# Patient Record
Sex: FEMALE | Race: WHITE | NOT HISPANIC OR LATINO
[De-identification: names, ages, dates, MRNs, and addresses within clinical notes are randomized per-mention and may not be internally consistent; named-entity substitution may affect disease eponyms.]

---

## 2017-01-03 ENCOUNTER — RX RENEWAL (OUTPATIENT)
Age: 67
End: 2017-01-03

## 2017-01-23 ENCOUNTER — MESSAGE (OUTPATIENT)
Age: 67
End: 2017-01-23

## 2017-02-28 ENCOUNTER — OUTPATIENT (OUTPATIENT)
Dept: OUTPATIENT SERVICES | Facility: HOSPITAL | Age: 67
LOS: 1 days | Discharge: HOME | End: 2017-02-28

## 2017-02-28 DIAGNOSIS — D47.3 ESSENTIAL (HEMORRHAGIC) THROMBOCYTHEMIA: ICD-10-CM

## 2017-03-01 ENCOUNTER — RESULT REVIEW (OUTPATIENT)
Age: 67
End: 2017-03-01

## 2017-03-02 ENCOUNTER — APPOINTMENT (OUTPATIENT)
Dept: HEMATOLOGY ONCOLOGY | Facility: CLINIC | Age: 67
End: 2017-03-02

## 2017-03-02 VITALS
SYSTOLIC BLOOD PRESSURE: 120 MMHG | HEART RATE: 82 BPM | WEIGHT: 178.57 LBS | RESPIRATION RATE: 16 BRPM | DIASTOLIC BLOOD PRESSURE: 72 MMHG | BODY MASS INDEX: 31.63 KG/M2 | TEMPERATURE: 97.4 F | OXYGEN SATURATION: 98 %

## 2017-03-02 LAB
BASOPHILS # BLD AUTO: 0 K/UL — SIGNIFICANT CHANGE UP (ref 0–0.2)
BASOPHILS NFR BLD AUTO: 0.5 % — SIGNIFICANT CHANGE UP (ref 0–2)
EOSINOPHIL # BLD AUTO: 0.1 K/UL — SIGNIFICANT CHANGE UP (ref 0–0.5)
EOSINOPHIL NFR BLD AUTO: 1 % — SIGNIFICANT CHANGE UP (ref 0–6)
HCT VFR BLD CALC: 36.4 % — SIGNIFICANT CHANGE UP (ref 34.5–45)
HGB BLD-MCNC: 13.2 G/DL — SIGNIFICANT CHANGE UP (ref 11.5–15.5)
LYMPHOCYTES # BLD AUTO: 1.3 K/UL — SIGNIFICANT CHANGE UP (ref 1–3.3)
LYMPHOCYTES # BLD AUTO: 15.3 % — SIGNIFICANT CHANGE UP (ref 13–44)
MCHC RBC-ENTMCNC: 36.4 G/DL — HIGH (ref 32–36)
MCHC RBC-ENTMCNC: 37.2 PG — HIGH (ref 27–34)
MCV RBC AUTO: 102 FL — HIGH (ref 80–100)
MONOCYTES # BLD AUTO: 0.6 K/UL — SIGNIFICANT CHANGE UP (ref 0–0.9)
MONOCYTES NFR BLD AUTO: 7.6 % — SIGNIFICANT CHANGE UP (ref 2–14)
NEUTROPHILS # BLD AUTO: 6.4 K/UL — SIGNIFICANT CHANGE UP (ref 1.8–7.4)
NEUTROPHILS NFR BLD AUTO: 75.5 % — SIGNIFICANT CHANGE UP (ref 43–77)
PLATELET # BLD AUTO: 333 K/UL — SIGNIFICANT CHANGE UP (ref 150–400)
RBC # BLD: 3.56 M/UL — LOW (ref 3.8–5.2)
RBC # FLD: 10.7 % — SIGNIFICANT CHANGE UP (ref 10.3–14.5)
WBC # BLD: 8.4 K/UL — SIGNIFICANT CHANGE UP (ref 3.8–10.5)
WBC # FLD AUTO: 8.4 K/UL — SIGNIFICANT CHANGE UP (ref 3.8–10.5)

## 2017-04-28 ENCOUNTER — OUTPATIENT (OUTPATIENT)
Dept: OUTPATIENT SERVICES | Facility: HOSPITAL | Age: 67
LOS: 1 days | Discharge: HOME | End: 2017-04-28

## 2017-04-28 DIAGNOSIS — D47.3 ESSENTIAL (HEMORRHAGIC) THROMBOCYTHEMIA: ICD-10-CM

## 2017-05-01 ENCOUNTER — RESULT REVIEW (OUTPATIENT)
Age: 67
End: 2017-05-01

## 2017-05-02 ENCOUNTER — APPOINTMENT (OUTPATIENT)
Dept: HEMATOLOGY ONCOLOGY | Facility: CLINIC | Age: 67
End: 2017-05-02

## 2017-05-02 LAB
BASOPHILS # BLD AUTO: 0.1 K/UL — SIGNIFICANT CHANGE UP (ref 0–0.2)
BASOPHILS NFR BLD AUTO: 0.7 % — SIGNIFICANT CHANGE UP (ref 0–2)
EOSINOPHIL # BLD AUTO: 0 K/UL — SIGNIFICANT CHANGE UP (ref 0–0.5)
EOSINOPHIL NFR BLD AUTO: 0.5 % — SIGNIFICANT CHANGE UP (ref 0–6)
HCT VFR BLD CALC: 39.6 % — SIGNIFICANT CHANGE UP (ref 34.5–45)
HGB BLD-MCNC: 14.3 G/DL — SIGNIFICANT CHANGE UP (ref 11.5–15.5)
LYMPHOCYTES # BLD AUTO: 1.5 K/UL — SIGNIFICANT CHANGE UP (ref 1–3.3)
LYMPHOCYTES # BLD AUTO: 16.9 % — SIGNIFICANT CHANGE UP (ref 13–44)
MCHC RBC-ENTMCNC: 36.1 G/DL — HIGH (ref 32–36)
MCHC RBC-ENTMCNC: 37.6 PG — HIGH (ref 27–34)
MCV RBC AUTO: 104 FL — HIGH (ref 80–100)
MONOCYTES # BLD AUTO: 0.5 K/UL — SIGNIFICANT CHANGE UP (ref 0–0.9)
MONOCYTES NFR BLD AUTO: 5.6 % — SIGNIFICANT CHANGE UP (ref 2–14)
NEUTROPHILS # BLD AUTO: 6.9 K/UL — SIGNIFICANT CHANGE UP (ref 1.8–7.4)
NEUTROPHILS NFR BLD AUTO: 76.2 % — SIGNIFICANT CHANGE UP (ref 43–77)
PLATELET # BLD AUTO: 372 K/UL — SIGNIFICANT CHANGE UP (ref 150–400)
RBC # BLD: 3.81 M/UL — SIGNIFICANT CHANGE UP (ref 3.8–5.2)
RBC # FLD: 10.9 % — SIGNIFICANT CHANGE UP (ref 10.3–14.5)
WBC # BLD: 9.1 K/UL — SIGNIFICANT CHANGE UP (ref 3.8–10.5)
WBC # FLD AUTO: 9.1 K/UL — SIGNIFICANT CHANGE UP (ref 3.8–10.5)

## 2017-07-05 ENCOUNTER — OUTPATIENT (OUTPATIENT)
Dept: OUTPATIENT SERVICES | Facility: HOSPITAL | Age: 67
LOS: 1 days | Discharge: HOME | End: 2017-07-05

## 2017-07-05 DIAGNOSIS — D47.3 ESSENTIAL (HEMORRHAGIC) THROMBOCYTHEMIA: ICD-10-CM

## 2017-07-11 ENCOUNTER — RESULT REVIEW (OUTPATIENT)
Age: 67
End: 2017-07-11

## 2017-07-11 ENCOUNTER — APPOINTMENT (OUTPATIENT)
Dept: HEMATOLOGY ONCOLOGY | Facility: CLINIC | Age: 67
End: 2017-07-11

## 2017-07-11 VITALS
BODY MASS INDEX: 31.28 KG/M2 | RESPIRATION RATE: 16 BRPM | OXYGEN SATURATION: 96 % | TEMPERATURE: 98.8 F | DIASTOLIC BLOOD PRESSURE: 89 MMHG | WEIGHT: 176.59 LBS | HEART RATE: 84 BPM | SYSTOLIC BLOOD PRESSURE: 148 MMHG

## 2017-07-11 LAB
BASOPHILS # BLD AUTO: 0.1 K/UL — SIGNIFICANT CHANGE UP (ref 0–0.2)
BASOPHILS NFR BLD AUTO: 0.6 % — SIGNIFICANT CHANGE UP (ref 0–2)
EOSINOPHIL # BLD AUTO: 0.1 K/UL — SIGNIFICANT CHANGE UP (ref 0–0.5)
EOSINOPHIL NFR BLD AUTO: 0.6 % — SIGNIFICANT CHANGE UP (ref 0–6)
HCT VFR BLD CALC: 39.8 % — SIGNIFICANT CHANGE UP (ref 34.5–45)
HGB BLD-MCNC: 14.5 G/DL — SIGNIFICANT CHANGE UP (ref 11.5–15.5)
LYMPHOCYTES # BLD AUTO: 1.3 K/UL — SIGNIFICANT CHANGE UP (ref 1–3.3)
LYMPHOCYTES # BLD AUTO: 13.5 % — SIGNIFICANT CHANGE UP (ref 13–44)
MCHC RBC-ENTMCNC: 36.3 G/DL — HIGH (ref 32–36)
MCHC RBC-ENTMCNC: 38.4 PG — HIGH (ref 27–34)
MCV RBC AUTO: 106 FL — HIGH (ref 80–100)
MONOCYTES # BLD AUTO: 0.7 K/UL — SIGNIFICANT CHANGE UP (ref 0–0.9)
MONOCYTES NFR BLD AUTO: 7.1 % — SIGNIFICANT CHANGE UP (ref 2–14)
NEUTROPHILS # BLD AUTO: 7.4 K/UL — SIGNIFICANT CHANGE UP (ref 1.8–7.4)
NEUTROPHILS NFR BLD AUTO: 78.3 % — HIGH (ref 43–77)
PLATELET # BLD AUTO: 380 K/UL — SIGNIFICANT CHANGE UP (ref 150–400)
RBC # BLD: 3.77 M/UL — LOW (ref 3.8–5.2)
RBC # FLD: 11 % — SIGNIFICANT CHANGE UP (ref 10.3–14.5)
WBC # BLD: 9.4 K/UL — SIGNIFICANT CHANGE UP (ref 3.8–10.5)
WBC # FLD AUTO: 9.4 K/UL — SIGNIFICANT CHANGE UP (ref 3.8–10.5)

## 2017-07-18 LAB
ALBUMIN SERPL ELPH-MCNC: 4.3 G/DL
ALBUMIN SERPL ELPH-MCNC: 4.6 G/DL
ALP BLD-CCNC: 65 U/L
ALP BLD-CCNC: 65 U/L
ALT SERPL-CCNC: 16 U/L
ALT SERPL-CCNC: 20 U/L
ANION GAP SERPL CALC-SCNC: 13 MMOL/L
ANION GAP SERPL CALC-SCNC: 19 MMOL/L
AST SERPL-CCNC: 14 U/L
AST SERPL-CCNC: 21 U/L
BILIRUB SERPL-MCNC: 0.4 MG/DL
BILIRUB SERPL-MCNC: 0.4 MG/DL
BUN SERPL-MCNC: 12 MG/DL
BUN SERPL-MCNC: 15 MG/DL
CALCIUM SERPL-MCNC: 9.3 MG/DL
CALCIUM SERPL-MCNC: 9.7 MG/DL
CHLORIDE SERPL-SCNC: 102 MMOL/L
CHLORIDE SERPL-SCNC: 104 MMOL/L
CO2 SERPL-SCNC: 22 MMOL/L
CO2 SERPL-SCNC: 25 MMOL/L
CREAT SERPL-MCNC: 0.64 MG/DL
CREAT SERPL-MCNC: 0.7 MG/DL
GLUCOSE SERPL-MCNC: 119 MG/DL
GLUCOSE SERPL-MCNC: 138 MG/DL
LDH SERPL-CCNC: 179 U/L
LDH SERPL-CCNC: 194 U/L
POTASSIUM SERPL-SCNC: 4 MMOL/L
POTASSIUM SERPL-SCNC: 4.1 MMOL/L
PROT SERPL-MCNC: 6.8 G/DL
PROT SERPL-MCNC: 7.2 G/DL
SODIUM SERPL-SCNC: 142 MMOL/L
SODIUM SERPL-SCNC: 143 MMOL/L

## 2017-08-30 ENCOUNTER — RX RENEWAL (OUTPATIENT)
Age: 67
End: 2017-08-30

## 2017-08-30 ENCOUNTER — OUTPATIENT (OUTPATIENT)
Dept: OUTPATIENT SERVICES | Facility: HOSPITAL | Age: 67
LOS: 1 days | Discharge: HOME | End: 2017-08-30

## 2017-08-30 DIAGNOSIS — D47.3 ESSENTIAL (HEMORRHAGIC) THROMBOCYTHEMIA: ICD-10-CM

## 2017-09-06 ENCOUNTER — RESULT REVIEW (OUTPATIENT)
Age: 67
End: 2017-09-06

## 2017-09-06 ENCOUNTER — APPOINTMENT (OUTPATIENT)
Dept: HEMATOLOGY ONCOLOGY | Facility: CLINIC | Age: 67
End: 2017-09-06

## 2017-09-06 LAB
BASOPHILS # BLD AUTO: 0.1 K/UL — SIGNIFICANT CHANGE UP (ref 0–0.2)
BASOPHILS NFR BLD AUTO: 1.1 % — SIGNIFICANT CHANGE UP (ref 0–2)
EOSINOPHIL # BLD AUTO: 0 K/UL — SIGNIFICANT CHANGE UP (ref 0–0.5)
EOSINOPHIL NFR BLD AUTO: 0 % — SIGNIFICANT CHANGE UP (ref 0–6)
HCT VFR BLD CALC: 39.2 % — SIGNIFICANT CHANGE UP (ref 34.5–45)
HGB BLD-MCNC: 14.4 G/DL — SIGNIFICANT CHANGE UP (ref 11.5–15.5)
LYMPHOCYTES # BLD AUTO: 1.6 K/UL — SIGNIFICANT CHANGE UP (ref 1–3.3)
LYMPHOCYTES # BLD AUTO: 18.4 % — SIGNIFICANT CHANGE UP (ref 13–44)
MCHC RBC-ENTMCNC: 36.8 G/DL — HIGH (ref 32–36)
MCHC RBC-ENTMCNC: 38.1 PG — HIGH (ref 27–34)
MCV RBC AUTO: 104 FL — HIGH (ref 80–100)
MONOCYTES # BLD AUTO: 0.5 K/UL — SIGNIFICANT CHANGE UP (ref 0–0.9)
MONOCYTES NFR BLD AUTO: 5.9 % — SIGNIFICANT CHANGE UP (ref 2–14)
NEUTROPHILS # BLD AUTO: 6.6 K/UL — SIGNIFICANT CHANGE UP (ref 1.8–7.4)
NEUTROPHILS NFR BLD AUTO: 74.7 % — SIGNIFICANT CHANGE UP (ref 43–77)
PLATELET # BLD AUTO: 344 K/UL — SIGNIFICANT CHANGE UP (ref 150–400)
RBC # BLD: 3.78 M/UL — LOW (ref 3.8–5.2)
RBC # FLD: 10.8 % — SIGNIFICANT CHANGE UP (ref 10.3–14.5)
WBC # BLD: 8.8 K/UL — SIGNIFICANT CHANGE UP (ref 3.8–10.5)
WBC # FLD AUTO: 8.8 K/UL — SIGNIFICANT CHANGE UP (ref 3.8–10.5)

## 2017-09-25 ENCOUNTER — MEDICATION RENEWAL (OUTPATIENT)
Age: 67
End: 2017-09-25

## 2017-09-28 ENCOUNTER — NON-APPOINTMENT (OUTPATIENT)
Age: 67
End: 2017-09-28

## 2017-09-28 ENCOUNTER — APPOINTMENT (OUTPATIENT)
Dept: CARDIOLOGY | Facility: CLINIC | Age: 67
End: 2017-09-28
Payer: MEDICARE

## 2017-09-28 VITALS
SYSTOLIC BLOOD PRESSURE: 150 MMHG | OXYGEN SATURATION: 97 % | DIASTOLIC BLOOD PRESSURE: 92 MMHG | HEART RATE: 86 BPM | BODY MASS INDEX: 31.18 KG/M2 | WEIGHT: 176 LBS

## 2017-09-28 PROCEDURE — 93000 ELECTROCARDIOGRAM COMPLETE: CPT

## 2017-09-28 PROCEDURE — 99205 OFFICE O/P NEW HI 60 MIN: CPT

## 2017-09-28 PROCEDURE — 99245 OFF/OP CONSLTJ NEW/EST HI 55: CPT

## 2017-09-28 RX ORDER — CEFUROXIME AXETIL 250 MG/1
250 TABLET ORAL
Qty: 20 | Refills: 0 | Status: DISCONTINUED | COMMUNITY
Start: 2017-06-20

## 2017-09-28 RX ORDER — OXYCODONE AND ACETAMINOPHEN 5; 325 MG/1; MG/1
5-325 TABLET ORAL
Qty: 12 | Refills: 0 | Status: DISCONTINUED | COMMUNITY
Start: 2017-07-18

## 2017-10-11 ENCOUNTER — OUTPATIENT (OUTPATIENT)
Dept: OUTPATIENT SERVICES | Facility: HOSPITAL | Age: 67
LOS: 1 days | End: 2017-10-11
Payer: MEDICARE

## 2017-10-11 ENCOUNTER — APPOINTMENT (OUTPATIENT)
Dept: CV DIAGNOSITCS | Facility: HOSPITAL | Age: 67
End: 2017-10-11

## 2017-10-11 ENCOUNTER — APPOINTMENT (OUTPATIENT)
Dept: CV DIAGNOSTICS | Facility: HOSPITAL | Age: 67
End: 2017-10-11

## 2017-10-11 DIAGNOSIS — R94.31 ABNORMAL ELECTROCARDIOGRAM [ECG] [EKG]: ICD-10-CM

## 2017-10-11 PROCEDURE — 78452 HT MUSCLE IMAGE SPECT MULT: CPT | Mod: 26

## 2017-10-11 PROCEDURE — 78452 HT MUSCLE IMAGE SPECT MULT: CPT

## 2017-10-11 PROCEDURE — 93306 TTE W/DOPPLER COMPLETE: CPT

## 2017-10-11 PROCEDURE — 93016 CV STRESS TEST SUPVJ ONLY: CPT

## 2017-10-11 PROCEDURE — 93018 CV STRESS TEST I&R ONLY: CPT

## 2017-10-11 PROCEDURE — 93017 CV STRESS TEST TRACING ONLY: CPT

## 2017-10-11 PROCEDURE — 93306 TTE W/DOPPLER COMPLETE: CPT | Mod: 26

## 2017-10-31 ENCOUNTER — APPOINTMENT (OUTPATIENT)
Dept: INTERNAL MEDICINE | Facility: CLINIC | Age: 67
End: 2017-10-31
Payer: MEDICARE

## 2017-10-31 VITALS
OXYGEN SATURATION: 98 % | DIASTOLIC BLOOD PRESSURE: 80 MMHG | HEART RATE: 84 BPM | SYSTOLIC BLOOD PRESSURE: 130 MMHG | WEIGHT: 176 LBS | HEIGHT: 63 IN | BODY MASS INDEX: 31.18 KG/M2

## 2017-10-31 PROCEDURE — G0439: CPT

## 2017-10-31 PROCEDURE — 99214 OFFICE O/P EST MOD 30 MIN: CPT

## 2017-11-03 ENCOUNTER — OUTPATIENT (OUTPATIENT)
Dept: OUTPATIENT SERVICES | Facility: HOSPITAL | Age: 67
LOS: 1 days | Discharge: HOME | End: 2017-11-03

## 2017-11-03 DIAGNOSIS — D47.3 ESSENTIAL (HEMORRHAGIC) THROMBOCYTHEMIA: ICD-10-CM

## 2017-11-03 LAB
25(OH)D3 SERPL-MCNC: 25 NG/ML
CHOLEST SERPL-MCNC: 190 MG/DL
CHOLEST/HDLC SERPL: 2.4 RATIO
HBA1C MFR BLD HPLC: 5.4 %
HDLC SERPL-MCNC: 79 MG/DL
LDLC SERPL CALC-MCNC: 76 MG/DL
TRIGL SERPL-MCNC: 175 MG/DL
TSH SERPL-ACNC: 3.26 UIU/ML

## 2017-11-07 ENCOUNTER — APPOINTMENT (OUTPATIENT)
Dept: HEMATOLOGY ONCOLOGY | Facility: CLINIC | Age: 67
End: 2017-11-07
Payer: MEDICARE

## 2017-11-07 ENCOUNTER — RESULT REVIEW (OUTPATIENT)
Age: 67
End: 2017-11-07

## 2017-11-07 VITALS
TEMPERATURE: 98.9 F | OXYGEN SATURATION: 98 % | RESPIRATION RATE: 16 BRPM | DIASTOLIC BLOOD PRESSURE: 94 MMHG | BODY MASS INDEX: 31.01 KG/M2 | HEART RATE: 83 BPM | SYSTOLIC BLOOD PRESSURE: 134 MMHG | WEIGHT: 175.05 LBS

## 2017-11-07 LAB
BASOPHILS # BLD AUTO: 0 K/UL — SIGNIFICANT CHANGE UP (ref 0–0.2)
BASOPHILS NFR BLD AUTO: 0.3 % — SIGNIFICANT CHANGE UP (ref 0–2)
EOSINOPHIL # BLD AUTO: 0 K/UL — SIGNIFICANT CHANGE UP (ref 0–0.5)
EOSINOPHIL NFR BLD AUTO: 0.3 % — SIGNIFICANT CHANGE UP (ref 0–6)
HCT VFR BLD CALC: 38.2 % — SIGNIFICANT CHANGE UP (ref 34.5–45)
HEMOCCULT STL QL IA: NEGATIVE
HGB BLD-MCNC: 14.1 G/DL — SIGNIFICANT CHANGE UP (ref 11.5–15.5)
LYMPHOCYTES # BLD AUTO: 1.6 K/UL — SIGNIFICANT CHANGE UP (ref 1–3.3)
LYMPHOCYTES # BLD AUTO: 19.7 % — SIGNIFICANT CHANGE UP (ref 13–44)
MCHC RBC-ENTMCNC: 36.8 G/DL — HIGH (ref 32–36)
MCHC RBC-ENTMCNC: 38.3 PG — HIGH (ref 27–34)
MCV RBC AUTO: 104 FL — HIGH (ref 80–100)
MONOCYTES # BLD AUTO: 0.7 K/UL — SIGNIFICANT CHANGE UP (ref 0–0.9)
MONOCYTES NFR BLD AUTO: 8.3 % — SIGNIFICANT CHANGE UP (ref 2–14)
NEUTROPHILS # BLD AUTO: 5.9 K/UL — SIGNIFICANT CHANGE UP (ref 1.8–7.4)
NEUTROPHILS NFR BLD AUTO: 71.4 % — SIGNIFICANT CHANGE UP (ref 43–77)
PLATELET # BLD AUTO: 330 K/UL — SIGNIFICANT CHANGE UP (ref 150–400)
RBC # BLD: 3.68 M/UL — LOW (ref 3.8–5.2)
RBC # FLD: 10.8 % — SIGNIFICANT CHANGE UP (ref 10.3–14.5)
WBC # BLD: 8.3 K/UL — SIGNIFICANT CHANGE UP (ref 3.8–10.5)
WBC # FLD AUTO: 8.3 K/UL — SIGNIFICANT CHANGE UP (ref 3.8–10.5)

## 2017-11-07 PROCEDURE — 99213 OFFICE O/P EST LOW 20 MIN: CPT

## 2017-11-08 LAB
ALBUMIN SERPL ELPH-MCNC: 4.4 G/DL
ALP BLD-CCNC: 66 U/L
ALT SERPL-CCNC: 17 U/L
ANION GAP SERPL CALC-SCNC: 15 MMOL/L
AST SERPL-CCNC: 16 U/L
BILIRUB SERPL-MCNC: 0.3 MG/DL
BUN SERPL-MCNC: 15 MG/DL
CALCIUM SERPL-MCNC: 9.3 MG/DL
CHLORIDE SERPL-SCNC: 101 MMOL/L
CO2 SERPL-SCNC: 24 MMOL/L
CREAT SERPL-MCNC: 0.71 MG/DL
GLUCOSE SERPL-MCNC: 99 MG/DL
LDH SERPL-CCNC: 182 U/L
POTASSIUM SERPL-SCNC: 4.3 MMOL/L
PROT SERPL-MCNC: 7.1 G/DL
SODIUM SERPL-SCNC: 140 MMOL/L
URATE SERPL-MCNC: 4.2 MG/DL

## 2018-01-16 ENCOUNTER — RX RENEWAL (OUTPATIENT)
Age: 68
End: 2018-01-16

## 2018-03-01 ENCOUNTER — OUTPATIENT (OUTPATIENT)
Dept: OUTPATIENT SERVICES | Facility: HOSPITAL | Age: 68
LOS: 1 days | Discharge: HOME | End: 2018-03-01

## 2018-03-01 DIAGNOSIS — D47.3 ESSENTIAL (HEMORRHAGIC) THROMBOCYTHEMIA: ICD-10-CM

## 2018-03-06 ENCOUNTER — APPOINTMENT (OUTPATIENT)
Dept: HEMATOLOGY ONCOLOGY | Facility: CLINIC | Age: 68
End: 2018-03-06
Payer: MEDICARE

## 2018-03-06 ENCOUNTER — RESULT REVIEW (OUTPATIENT)
Age: 68
End: 2018-03-06

## 2018-03-06 VITALS
DIASTOLIC BLOOD PRESSURE: 80 MMHG | WEIGHT: 180.78 LBS | RESPIRATION RATE: 16 BRPM | SYSTOLIC BLOOD PRESSURE: 130 MMHG | TEMPERATURE: 98.4 F | OXYGEN SATURATION: 98 % | HEART RATE: 97 BPM | BODY MASS INDEX: 32.02 KG/M2

## 2018-03-06 LAB
BASOPHILS # BLD AUTO: 0 K/UL — SIGNIFICANT CHANGE UP (ref 0–0.2)
BASOPHILS NFR BLD AUTO: 0.5 % — SIGNIFICANT CHANGE UP (ref 0–2)
EOSINOPHIL # BLD AUTO: 0 K/UL — SIGNIFICANT CHANGE UP (ref 0–0.5)
EOSINOPHIL NFR BLD AUTO: 0.5 % — SIGNIFICANT CHANGE UP (ref 0–6)
HCT VFR BLD CALC: 37 % — SIGNIFICANT CHANGE UP (ref 34.5–45)
HGB BLD-MCNC: 13.5 G/DL — SIGNIFICANT CHANGE UP (ref 11.5–15.5)
LYMPHOCYTES # BLD AUTO: 1.2 K/UL — SIGNIFICANT CHANGE UP (ref 1–3.3)
LYMPHOCYTES # BLD AUTO: 14.5 % — SIGNIFICANT CHANGE UP (ref 13–44)
MCHC RBC-ENTMCNC: 36.4 G/DL — HIGH (ref 32–36)
MCHC RBC-ENTMCNC: 37.5 PG — HIGH (ref 27–34)
MCV RBC AUTO: 103 FL — HIGH (ref 80–100)
MONOCYTES # BLD AUTO: 0.5 K/UL — SIGNIFICANT CHANGE UP (ref 0–0.9)
MONOCYTES NFR BLD AUTO: 6.5 % — SIGNIFICANT CHANGE UP (ref 2–14)
NEUTROPHILS # BLD AUTO: 6.5 K/UL — SIGNIFICANT CHANGE UP (ref 1.8–7.4)
NEUTROPHILS NFR BLD AUTO: 78 % — HIGH (ref 43–77)
PLATELET # BLD AUTO: 333 K/UL — SIGNIFICANT CHANGE UP (ref 150–400)
RBC # BLD: 3.58 M/UL — LOW (ref 3.8–5.2)
RBC # FLD: 10.8 % — SIGNIFICANT CHANGE UP (ref 10.3–14.5)
WBC # BLD: 8.3 K/UL — SIGNIFICANT CHANGE UP (ref 3.8–10.5)
WBC # FLD AUTO: 8.3 K/UL — SIGNIFICANT CHANGE UP (ref 3.8–10.5)

## 2018-03-06 PROCEDURE — 99213 OFFICE O/P EST LOW 20 MIN: CPT

## 2018-03-08 ENCOUNTER — MESSAGE (OUTPATIENT)
Age: 68
End: 2018-03-08

## 2018-03-13 LAB
ALBUMIN SERPL ELPH-MCNC: 4.5 G/DL
ALP BLD-CCNC: 64 U/L
ALT SERPL-CCNC: 16 U/L
ANION GAP SERPL CALC-SCNC: 16 MMOL/L
AST SERPL-CCNC: 14 U/L
BILIRUB SERPL-MCNC: 0.4 MG/DL
BUN SERPL-MCNC: 12 MG/DL
CALCIUM SERPL-MCNC: 10 MG/DL
CHLORIDE SERPL-SCNC: 102 MMOL/L
CO2 SERPL-SCNC: 22 MMOL/L
CREAT SERPL-MCNC: 0.7 MG/DL
GLUCOSE SERPL-MCNC: 131 MG/DL
LDH SERPL-CCNC: 194 U/L
POTASSIUM SERPL-SCNC: 4.2 MMOL/L
PROT SERPL-MCNC: 6.9 G/DL
SODIUM SERPL-SCNC: 140 MMOL/L
URATE SERPL-MCNC: 4.7 MG/DL

## 2018-07-03 ENCOUNTER — OUTPATIENT (OUTPATIENT)
Dept: OUTPATIENT SERVICES | Facility: HOSPITAL | Age: 68
LOS: 1 days | Discharge: HOME | End: 2018-07-03

## 2018-07-03 DIAGNOSIS — D47.3 ESSENTIAL (HEMORRHAGIC) THROMBOCYTHEMIA: ICD-10-CM

## 2018-07-10 ENCOUNTER — APPOINTMENT (OUTPATIENT)
Dept: HEMATOLOGY ONCOLOGY | Facility: CLINIC | Age: 68
End: 2018-07-10
Payer: MEDICARE

## 2018-07-10 ENCOUNTER — RESULT REVIEW (OUTPATIENT)
Age: 68
End: 2018-07-10

## 2018-07-10 VITALS
RESPIRATION RATE: 16 BRPM | SYSTOLIC BLOOD PRESSURE: 125 MMHG | OXYGEN SATURATION: 98 % | WEIGHT: 178.57 LBS | DIASTOLIC BLOOD PRESSURE: 87 MMHG | HEART RATE: 120 BPM | BODY MASS INDEX: 31.63 KG/M2 | TEMPERATURE: 99.2 F

## 2018-07-10 LAB
BASOPHILS # BLD AUTO: 0 K/UL — SIGNIFICANT CHANGE UP (ref 0–0.2)
BASOPHILS NFR BLD AUTO: 0.4 % — SIGNIFICANT CHANGE UP (ref 0–2)
EOSINOPHIL # BLD AUTO: 0 K/UL — SIGNIFICANT CHANGE UP (ref 0–0.5)
EOSINOPHIL NFR BLD AUTO: 0.4 % — SIGNIFICANT CHANGE UP (ref 0–6)
HCT VFR BLD CALC: 39.2 % — SIGNIFICANT CHANGE UP (ref 34.5–45)
HGB BLD-MCNC: 13.9 G/DL — SIGNIFICANT CHANGE UP (ref 11.5–15.5)
LYMPHOCYTES # BLD AUTO: 1.3 K/UL — SIGNIFICANT CHANGE UP (ref 1–3.3)
LYMPHOCYTES # BLD AUTO: 16.6 % — SIGNIFICANT CHANGE UP (ref 13–44)
MCHC RBC-ENTMCNC: 35.3 G/DL — SIGNIFICANT CHANGE UP (ref 32–36)
MCHC RBC-ENTMCNC: 36.6 PG — HIGH (ref 27–34)
MCV RBC AUTO: 104 FL — HIGH (ref 80–100)
MONOCYTES # BLD AUTO: 0.6 K/UL — SIGNIFICANT CHANGE UP (ref 0–0.9)
MONOCYTES NFR BLD AUTO: 8.2 % — SIGNIFICANT CHANGE UP (ref 2–14)
NEUTROPHILS # BLD AUTO: 5.6 K/UL — SIGNIFICANT CHANGE UP (ref 1.8–7.4)
NEUTROPHILS NFR BLD AUTO: 74.4 % — SIGNIFICANT CHANGE UP (ref 43–77)
PLATELET # BLD AUTO: 312 K/UL — SIGNIFICANT CHANGE UP (ref 150–400)
RBC # BLD: 3.79 M/UL — LOW (ref 3.8–5.2)
RBC # FLD: 11.6 % — SIGNIFICANT CHANGE UP (ref 10.3–14.5)
WBC # BLD: 7.6 K/UL — SIGNIFICANT CHANGE UP (ref 3.8–10.5)
WBC # FLD AUTO: 7.6 K/UL — SIGNIFICANT CHANGE UP (ref 3.8–10.5)

## 2018-07-10 PROCEDURE — 99213 OFFICE O/P EST LOW 20 MIN: CPT

## 2018-07-23 LAB
ALBUMIN SERPL ELPH-MCNC: 4.3 G/DL
ALP BLD-CCNC: 73 U/L
ALT SERPL-CCNC: 14 U/L
ANION GAP SERPL CALC-SCNC: 13 MMOL/L
AST SERPL-CCNC: 15 U/L
BILIRUB SERPL-MCNC: 0.4 MG/DL
BUN SERPL-MCNC: 10 MG/DL
CALCIUM SERPL-MCNC: 9.6 MG/DL
CHLORIDE SERPL-SCNC: 102 MMOL/L
CO2 SERPL-SCNC: 25 MMOL/L
CREAT SERPL-MCNC: 0.58 MG/DL
GLUCOSE SERPL-MCNC: 136 MG/DL
LDH SERPL-CCNC: 190 U/L
POTASSIUM SERPL-SCNC: 3.8 MMOL/L
PROT SERPL-MCNC: 6.8 G/DL
SODIUM SERPL-SCNC: 140 MMOL/L

## 2018-08-22 ENCOUNTER — RX RENEWAL (OUTPATIENT)
Age: 68
End: 2018-08-22

## 2018-09-05 ENCOUNTER — MESSAGE (OUTPATIENT)
Age: 68
End: 2018-09-05

## 2018-09-13 ENCOUNTER — APPOINTMENT (OUTPATIENT)
Dept: INTERNAL MEDICINE | Facility: CLINIC | Age: 68
End: 2018-09-13
Payer: MEDICARE

## 2018-09-13 VITALS
WEIGHT: 178 LBS | SYSTOLIC BLOOD PRESSURE: 140 MMHG | HEART RATE: 97 BPM | OXYGEN SATURATION: 98 % | DIASTOLIC BLOOD PRESSURE: 88 MMHG | BODY MASS INDEX: 31.53 KG/M2

## 2018-09-13 PROCEDURE — 99214 OFFICE O/P EST MOD 30 MIN: CPT

## 2018-09-13 NOTE — REVIEW OF SYSTEMS
[Joint Pain] : joint pain [Skin Rash] : skin rash [Negative] : Respiratory [FreeTextEntry7] : see hpi [de-identified] : see hpi- hives

## 2018-09-13 NOTE — PHYSICAL EXAM
[No JVD] : no jugular venous distention [No Respiratory Distress] : no respiratory distress  [Clear to Auscultation] : lungs were clear to auscultation bilaterally [No Edema] : there was no peripheral edema [Soft] : abdomen soft [Non Tender] : non-tender [Non-distended] : non-distended

## 2018-09-13 NOTE — HISTORY OF PRESENT ILLNESS
[FreeTextEntry8] : 66 y/o F here for visit to discuss celiac testing.\glory For many years, about 30, has been experiencing itching and hives.\par One month ago, completely broke out in hives. \par Since TKR 4/18, better, but process was difficult. \par For 2 months was in pain x 2 months. Went home from surgery.  Tried to taper off oxycodone. \par Was also trying tylenol.  About 3 weeks ago, awoke with vertigo. Had taken 2 motrin night before. \par Also felt light-headed.  Was researching causes of hives. She realized she has a lot of symptoms of Celiac (gas, anxiety, sleep dysfunction, bloating).  Wonders if she has an allergy to wheat. \glory Denies blood in stool, no abd pain other than gas pain. No weight loss. Never tried cutting out wheat. Requesting celiac disease testing. \par Also very concerned about her daughter's health. \par

## 2018-09-13 NOTE — ASSESSMENT
[FreeTextEntry1] : 68 y/o F here for c/o recurrent hives, requesting testing for celiac disease.\par GI symptoms are non-specific.  Advised allergy testing as a start. \par HTN: Acceptable, c/w regimen\par Essential thrombocytosis: f/u hematology\par HCM: utd per pt. \par rto for cpe\par Spent at least 30 minutes with patient.

## 2018-09-24 ENCOUNTER — MESSAGE (OUTPATIENT)
Age: 68
End: 2018-09-24

## 2018-10-01 ENCOUNTER — OUTPATIENT (OUTPATIENT)
Dept: OUTPATIENT SERVICES | Facility: HOSPITAL | Age: 68
LOS: 1 days | Discharge: HOME | End: 2018-10-01

## 2018-10-01 DIAGNOSIS — D47.3 ESSENTIAL (HEMORRHAGIC) THROMBOCYTHEMIA: ICD-10-CM

## 2018-10-09 ENCOUNTER — RESULT REVIEW (OUTPATIENT)
Age: 68
End: 2018-10-09

## 2018-10-09 ENCOUNTER — APPOINTMENT (OUTPATIENT)
Dept: HEMATOLOGY ONCOLOGY | Facility: CLINIC | Age: 68
End: 2018-10-09

## 2018-10-09 LAB
BASOPHILS # BLD AUTO: 0 K/UL — SIGNIFICANT CHANGE UP (ref 0–0.2)
BASOPHILS NFR BLD AUTO: 0.3 % — SIGNIFICANT CHANGE UP (ref 0–2)
EOSINOPHIL # BLD AUTO: 0.1 K/UL — SIGNIFICANT CHANGE UP (ref 0–0.5)
EOSINOPHIL NFR BLD AUTO: 1.2 % — SIGNIFICANT CHANGE UP (ref 0–6)
HCT VFR BLD CALC: 39.7 % — SIGNIFICANT CHANGE UP (ref 34.5–45)
HGB BLD-MCNC: 14.2 G/DL — SIGNIFICANT CHANGE UP (ref 11.5–15.5)
LYMPHOCYTES # BLD AUTO: 1.3 K/UL — SIGNIFICANT CHANGE UP (ref 1–3.3)
LYMPHOCYTES # BLD AUTO: 12.7 % — LOW (ref 13–44)
MCHC RBC-ENTMCNC: 35.7 G/DL — SIGNIFICANT CHANGE UP (ref 32–36)
MCHC RBC-ENTMCNC: 36.8 PG — HIGH (ref 27–34)
MCV RBC AUTO: 103 FL — HIGH (ref 80–100)
MONOCYTES # BLD AUTO: 0.6 K/UL — SIGNIFICANT CHANGE UP (ref 0–0.9)
MONOCYTES NFR BLD AUTO: 6.2 % — SIGNIFICANT CHANGE UP (ref 2–14)
NEUTROPHILS # BLD AUTO: 8.2 K/UL — HIGH (ref 1.8–7.4)
NEUTROPHILS NFR BLD AUTO: 79.5 % — HIGH (ref 43–77)
PLATELET # BLD AUTO: 347 K/UL — SIGNIFICANT CHANGE UP (ref 150–400)
RBC # BLD: 3.85 M/UL — SIGNIFICANT CHANGE UP (ref 3.8–5.2)
RBC # FLD: 10.9 % — SIGNIFICANT CHANGE UP (ref 10.3–14.5)
WBC # BLD: 10.3 K/UL — SIGNIFICANT CHANGE UP (ref 3.8–10.5)
WBC # FLD AUTO: 10.3 K/UL — SIGNIFICANT CHANGE UP (ref 3.8–10.5)

## 2018-11-06 ENCOUNTER — APPOINTMENT (OUTPATIENT)
Dept: PEDIATRIC ALLERGY IMMUNOLOGY | Facility: CLINIC | Age: 68
End: 2018-11-06
Payer: MEDICARE

## 2018-11-06 VITALS
BODY MASS INDEX: 31.89 KG/M2 | SYSTOLIC BLOOD PRESSURE: 125 MMHG | DIASTOLIC BLOOD PRESSURE: 81 MMHG | HEART RATE: 97 BPM | WEIGHT: 179.99 LBS

## 2018-11-06 DIAGNOSIS — L29.9 PRURITUS, UNSPECIFIED: ICD-10-CM

## 2018-11-06 PROCEDURE — 95004 PERQ TESTS W/ALRGNC XTRCS: CPT

## 2018-11-06 PROCEDURE — 99204 OFFICE O/P NEW MOD 45 MIN: CPT | Mod: 25

## 2018-12-19 ENCOUNTER — OUTPATIENT (OUTPATIENT)
Dept: OUTPATIENT SERVICES | Facility: HOSPITAL | Age: 68
LOS: 1 days | Discharge: HOME | End: 2018-12-19

## 2018-12-19 DIAGNOSIS — D47.3 ESSENTIAL (HEMORRHAGIC) THROMBOCYTHEMIA: ICD-10-CM

## 2019-01-03 ENCOUNTER — MEDICATION RENEWAL (OUTPATIENT)
Age: 69
End: 2019-01-03

## 2019-01-04 ENCOUNTER — RESULT REVIEW (OUTPATIENT)
Age: 69
End: 2019-01-04

## 2019-01-04 ENCOUNTER — APPOINTMENT (OUTPATIENT)
Dept: HEMATOLOGY ONCOLOGY | Facility: CLINIC | Age: 69
End: 2019-01-04
Payer: MEDICARE

## 2019-01-04 VITALS
HEART RATE: 99 BPM | RESPIRATION RATE: 16 BRPM | DIASTOLIC BLOOD PRESSURE: 74 MMHG | BODY MASS INDEX: 31.63 KG/M2 | SYSTOLIC BLOOD PRESSURE: 120 MMHG | OXYGEN SATURATION: 98 % | WEIGHT: 178.57 LBS | TEMPERATURE: 98.2 F

## 2019-01-04 LAB
BASOPHILS # BLD AUTO: 0 K/UL — SIGNIFICANT CHANGE UP (ref 0–0.2)
BASOPHILS NFR BLD AUTO: 0.3 % — SIGNIFICANT CHANGE UP (ref 0–2)
EOSINOPHIL # BLD AUTO: 0.2 K/UL — SIGNIFICANT CHANGE UP (ref 0–0.5)
EOSINOPHIL NFR BLD AUTO: 1.6 % — SIGNIFICANT CHANGE UP (ref 0–6)
HCT VFR BLD CALC: 40.5 % — SIGNIFICANT CHANGE UP (ref 34.5–45)
HGB BLD-MCNC: 14.1 G/DL — SIGNIFICANT CHANGE UP (ref 11.5–15.5)
LYMPHOCYTES # BLD AUTO: 1.9 K/UL — SIGNIFICANT CHANGE UP (ref 1–3.3)
LYMPHOCYTES # BLD AUTO: 18.8 % — SIGNIFICANT CHANGE UP (ref 13–44)
MCHC RBC-ENTMCNC: 34.8 G/DL — SIGNIFICANT CHANGE UP (ref 32–36)
MCHC RBC-ENTMCNC: 35.3 PG — HIGH (ref 27–34)
MCV RBC AUTO: 102 FL — HIGH (ref 80–100)
MONOCYTES # BLD AUTO: 0.8 K/UL — SIGNIFICANT CHANGE UP (ref 0–0.9)
MONOCYTES NFR BLD AUTO: 7.8 % — SIGNIFICANT CHANGE UP (ref 2–14)
NEUTROPHILS # BLD AUTO: 7.3 K/UL — SIGNIFICANT CHANGE UP (ref 1.8–7.4)
NEUTROPHILS NFR BLD AUTO: 71.6 % — SIGNIFICANT CHANGE UP (ref 43–77)
PLATELET # BLD AUTO: 388 K/UL — SIGNIFICANT CHANGE UP (ref 150–400)
RBC # BLD: 3.99 M/UL — SIGNIFICANT CHANGE UP (ref 3.8–5.2)
RBC # FLD: 10.7 % — SIGNIFICANT CHANGE UP (ref 10.3–14.5)
WBC # BLD: 10.1 K/UL — SIGNIFICANT CHANGE UP (ref 3.8–10.5)
WBC # FLD AUTO: 10.1 K/UL — SIGNIFICANT CHANGE UP (ref 3.8–10.5)

## 2019-01-04 PROCEDURE — 99213 OFFICE O/P EST LOW 20 MIN: CPT

## 2019-01-04 NOTE — ASSESSMENT
[FreeTextEntry1] : 68yoF with a h/o ET jak2+ diagnosed 4/11 on HU 500mg daily tolerating well\par - here for follow up of her counts\par -continue HU daily 500mg\par -return for visit in 6 months, repeat CBC In 3 months to ensure counts stable\par -reviewed signs and symptoms for patient to watch out for (erythromelalgia, tinnitus, headaches, dizziness etc)\par -asymptomatic\par \par \par

## 2019-01-04 NOTE — HISTORY OF PRESENT ILLNESS
[Disease:__________________________] : Disease: [unfilled] [de-identified] : 64yoF with a h/o jak2 positive Essential Thrombocytosis diagnosed in 4/11 controlled on hydroxyurea 500 since that time. She has no history of thrombosis in the past.  She is taking aspirin daily.\par \par \par  [de-identified] : high risk b/c age [de-identified] : -still needs to have implants\par -taking the HU 500mg\par -also taking the aspirin\par -doing well, feeling well\par \par

## 2019-01-22 ENCOUNTER — MEDICATION RENEWAL (OUTPATIENT)
Age: 69
End: 2019-01-22

## 2019-01-24 ENCOUNTER — RX RENEWAL (OUTPATIENT)
Age: 69
End: 2019-01-24

## 2019-03-13 ENCOUNTER — MESSAGE (OUTPATIENT)
Age: 69
End: 2019-03-13

## 2019-03-26 ENCOUNTER — OUTPATIENT (OUTPATIENT)
Dept: OUTPATIENT SERVICES | Facility: HOSPITAL | Age: 69
LOS: 1 days | Discharge: HOME | End: 2019-03-26

## 2019-03-26 DIAGNOSIS — D47.3 ESSENTIAL (HEMORRHAGIC) THROMBOCYTHEMIA: ICD-10-CM

## 2019-04-04 ENCOUNTER — RESULT REVIEW (OUTPATIENT)
Age: 69
End: 2019-04-04

## 2019-04-04 ENCOUNTER — APPOINTMENT (OUTPATIENT)
Dept: HEMATOLOGY ONCOLOGY | Facility: CLINIC | Age: 69
End: 2019-04-04

## 2019-04-04 LAB
BASOPHILS # BLD AUTO: 0 K/UL — SIGNIFICANT CHANGE UP (ref 0–0.2)
BASOPHILS NFR BLD AUTO: 0.3 % — SIGNIFICANT CHANGE UP (ref 0–2)
EOSINOPHIL # BLD AUTO: 0.1 K/UL — SIGNIFICANT CHANGE UP (ref 0–0.5)
EOSINOPHIL NFR BLD AUTO: 1.4 % — SIGNIFICANT CHANGE UP (ref 0–6)
HCT VFR BLD CALC: 38.7 % — SIGNIFICANT CHANGE UP (ref 34.5–45)
HGB BLD-MCNC: 14.3 G/DL — SIGNIFICANT CHANGE UP (ref 11.5–15.5)
LYMPHOCYTES # BLD AUTO: 1.5 K/UL — SIGNIFICANT CHANGE UP (ref 1–3.3)
LYMPHOCYTES # BLD AUTO: 15.5 % — SIGNIFICANT CHANGE UP (ref 13–44)
MCHC RBC-ENTMCNC: 37 G/DL — HIGH (ref 32–36)
MCHC RBC-ENTMCNC: 38 PG — HIGH (ref 27–34)
MCV RBC AUTO: 103 FL — HIGH (ref 80–100)
MONOCYTES # BLD AUTO: 0.8 K/UL — SIGNIFICANT CHANGE UP (ref 0–0.9)
MONOCYTES NFR BLD AUTO: 7.7 % — SIGNIFICANT CHANGE UP (ref 2–14)
NEUTROPHILS # BLD AUTO: 7.3 K/UL — SIGNIFICANT CHANGE UP (ref 1.8–7.4)
NEUTROPHILS NFR BLD AUTO: 75.1 % — SIGNIFICANT CHANGE UP (ref 43–77)
PLATELET # BLD AUTO: 375 K/UL — SIGNIFICANT CHANGE UP (ref 150–400)
RBC # BLD: 3.77 M/UL — LOW (ref 3.8–5.2)
RBC # FLD: 10.8 % — SIGNIFICANT CHANGE UP (ref 10.3–14.5)
WBC # BLD: 9.7 K/UL — SIGNIFICANT CHANGE UP (ref 3.8–10.5)
WBC # FLD AUTO: 9.7 K/UL — SIGNIFICANT CHANGE UP (ref 3.8–10.5)

## 2019-04-12 ENCOUNTER — RX RENEWAL (OUTPATIENT)
Age: 69
End: 2019-04-12

## 2019-06-10 ENCOUNTER — RX RENEWAL (OUTPATIENT)
Age: 69
End: 2019-06-10

## 2019-06-12 ENCOUNTER — MEDICATION RENEWAL (OUTPATIENT)
Age: 69
End: 2019-06-12

## 2019-06-24 ENCOUNTER — RX RENEWAL (OUTPATIENT)
Age: 69
End: 2019-06-24

## 2019-07-11 ENCOUNTER — OUTPATIENT (OUTPATIENT)
Dept: OUTPATIENT SERVICES | Facility: HOSPITAL | Age: 69
LOS: 1 days | Discharge: HOME | End: 2019-07-11

## 2019-07-11 DIAGNOSIS — D47.3 ESSENTIAL (HEMORRHAGIC) THROMBOCYTHEMIA: ICD-10-CM

## 2019-07-16 ENCOUNTER — RESULT REVIEW (OUTPATIENT)
Age: 69
End: 2019-07-16

## 2019-07-16 ENCOUNTER — APPOINTMENT (OUTPATIENT)
Dept: HEMATOLOGY ONCOLOGY | Facility: CLINIC | Age: 69
End: 2019-07-16
Payer: MEDICARE

## 2019-07-16 VITALS
DIASTOLIC BLOOD PRESSURE: 78 MMHG | TEMPERATURE: 99.1 F | HEART RATE: 86 BPM | OXYGEN SATURATION: 98 % | RESPIRATION RATE: 15 BRPM | BODY MASS INDEX: 31.75 KG/M2 | SYSTOLIC BLOOD PRESSURE: 125 MMHG | WEIGHT: 179.21 LBS

## 2019-07-16 LAB
BASOPHILS # BLD AUTO: 0 K/UL — SIGNIFICANT CHANGE UP (ref 0–0.2)
BASOPHILS NFR BLD AUTO: 0.3 % — SIGNIFICANT CHANGE UP (ref 0–2)
EOSINOPHIL # BLD AUTO: 0 K/UL — SIGNIFICANT CHANGE UP (ref 0–0.5)
EOSINOPHIL NFR BLD AUTO: 0.4 % — SIGNIFICANT CHANGE UP (ref 0–6)
HCT VFR BLD CALC: 41 % — SIGNIFICANT CHANGE UP (ref 34.5–45)
HGB BLD-MCNC: 14 G/DL — SIGNIFICANT CHANGE UP (ref 11.5–15.5)
LYMPHOCYTES # BLD AUTO: 18.5 % — SIGNIFICANT CHANGE UP (ref 13–44)
LYMPHOCYTES # BLD AUTO: 2 K/UL — SIGNIFICANT CHANGE UP (ref 1–3.3)
MCHC RBC-ENTMCNC: 34.1 G/DL — SIGNIFICANT CHANGE UP (ref 32–36)
MCHC RBC-ENTMCNC: 36.1 PG — HIGH (ref 27–34)
MCV RBC AUTO: 106 FL — HIGH (ref 80–100)
MONOCYTES # BLD AUTO: 0.8 K/UL — SIGNIFICANT CHANGE UP (ref 0–0.9)
MONOCYTES NFR BLD AUTO: 7.4 % — SIGNIFICANT CHANGE UP (ref 2–14)
NEUTROPHILS # BLD AUTO: 8.1 K/UL — HIGH (ref 1.8–7.4)
NEUTROPHILS NFR BLD AUTO: 73.4 % — SIGNIFICANT CHANGE UP (ref 43–77)
PLATELET # BLD AUTO: 342 K/UL — SIGNIFICANT CHANGE UP (ref 150–400)
RBC # BLD: 3.88 M/UL — SIGNIFICANT CHANGE UP (ref 3.8–5.2)
RBC # FLD: 10.7 % — SIGNIFICANT CHANGE UP (ref 10.3–14.5)
WBC # BLD: 11.1 K/UL — HIGH (ref 3.8–10.5)
WBC # FLD AUTO: 11.1 K/UL — HIGH (ref 3.8–10.5)

## 2019-07-16 PROCEDURE — 99213 OFFICE O/P EST LOW 20 MIN: CPT

## 2019-07-16 NOTE — ASSESSMENT
[FreeTextEntry1] : 68yoF with a h/o ET jak2+ diagnosed 4/11 on HU 500mg daily tolerating well\par -continue HU daily 500mg\par -return for visit in 6 months, repeat CBC In 3 months to ensure counts stable\par -reviewed signs and symptoms for patient to watch out for (erythromelalgia, tinnitus, headaches, dizziness etc)\par -asymptomatic, continue surveillance every 6 months\par \par \par

## 2019-07-16 NOTE — REVIEW OF SYSTEMS
[Joint Pain] : joint pain [Negative] : Allergic/Immunologic [FreeTextEntry9] : knee pain improved but still pain on the side of the knee that did not have surgerty

## 2019-07-16 NOTE — HISTORY OF PRESENT ILLNESS
[Disease:__________________________] : Disease: [unfilled] [de-identified] : 68yoF with a h/o jak2 positive Essential Thrombocytosis diagnosed in 4/11 controlled on hydroxyurea 500 since that time. She has no history of thrombosis in the past.  She is taking aspirin daily.\par \par \par  [de-identified] : high risk b/c age [de-identified] : -feeling well\par -eating well\par -had the flu for 5 weeks, in may, hen bronchial infection, ear infection, sinus infection-all of may\par -otherwise feeling well\par -no f/c/s\par -no abdominal pain, no back pain\par -no itching\par \par

## 2019-09-07 LAB
ALBUMIN SERPL ELPH-MCNC: 4.5 G/DL
ALP BLD-CCNC: 67 U/L
ALT SERPL-CCNC: 17 U/L
ANION GAP SERPL CALC-SCNC: 12 MMOL/L
AST SERPL-CCNC: 17 U/L
BILIRUB SERPL-MCNC: 0.3 MG/DL
BUN SERPL-MCNC: 15 MG/DL
CALCIUM SERPL-MCNC: 9.8 MG/DL
CHLORIDE SERPL-SCNC: 103 MMOL/L
CO2 SERPL-SCNC: 24 MMOL/L
CREAT SERPL-MCNC: 0.6 MG/DL
GLUCOSE SERPL-MCNC: 129 MG/DL
LDH SERPL-CCNC: 211 U/L
POTASSIUM SERPL-SCNC: 3.9 MMOL/L
PROT SERPL-MCNC: 6.9 G/DL
SODIUM SERPL-SCNC: 139 MMOL/L
URATE SERPL-MCNC: 3.9 MG/DL

## 2019-10-12 ENCOUNTER — OUTPATIENT (OUTPATIENT)
Dept: OUTPATIENT SERVICES | Facility: HOSPITAL | Age: 69
LOS: 1 days | Discharge: HOME | End: 2019-10-12

## 2019-10-12 DIAGNOSIS — D47.3 ESSENTIAL (HEMORRHAGIC) THROMBOCYTHEMIA: ICD-10-CM

## 2019-10-15 ENCOUNTER — APPOINTMENT (OUTPATIENT)
Dept: HEMATOLOGY ONCOLOGY | Facility: CLINIC | Age: 69
End: 2019-10-15

## 2019-10-28 ENCOUNTER — MEDICATION RENEWAL (OUTPATIENT)
Age: 69
End: 2019-10-28

## 2019-11-04 ENCOUNTER — RESULT REVIEW (OUTPATIENT)
Age: 69
End: 2019-11-04

## 2019-11-04 ENCOUNTER — APPOINTMENT (OUTPATIENT)
Dept: HEMATOLOGY ONCOLOGY | Facility: CLINIC | Age: 69
End: 2019-11-04

## 2019-11-04 LAB
BASOPHILS # BLD AUTO: 0.1 K/UL — SIGNIFICANT CHANGE UP (ref 0–0.2)
BASOPHILS NFR BLD AUTO: 0.6 % — SIGNIFICANT CHANGE UP (ref 0–2)
EOSINOPHIL # BLD AUTO: 0.1 K/UL — SIGNIFICANT CHANGE UP (ref 0–0.5)
EOSINOPHIL NFR BLD AUTO: 1.5 % — SIGNIFICANT CHANGE UP (ref 0–6)
HCT VFR BLD CALC: 39.5 % — SIGNIFICANT CHANGE UP (ref 34.5–45)
HGB BLD-MCNC: 14.2 G/DL — SIGNIFICANT CHANGE UP (ref 11.5–15.5)
LYMPHOCYTES # BLD AUTO: 1.5 K/UL — SIGNIFICANT CHANGE UP (ref 1–3.3)
LYMPHOCYTES # BLD AUTO: 15.7 % — SIGNIFICANT CHANGE UP (ref 13–44)
MCHC RBC-ENTMCNC: 35.8 G/DL — SIGNIFICANT CHANGE UP (ref 32–36)
MCHC RBC-ENTMCNC: 37.3 PG — HIGH (ref 27–34)
MCV RBC AUTO: 104 FL — HIGH (ref 80–100)
MONOCYTES # BLD AUTO: 0.7 K/UL — SIGNIFICANT CHANGE UP (ref 0–0.9)
MONOCYTES NFR BLD AUTO: 7.6 % — SIGNIFICANT CHANGE UP (ref 2–14)
NEUTROPHILS # BLD AUTO: 7.1 K/UL — SIGNIFICANT CHANGE UP (ref 1.8–7.4)
NEUTROPHILS NFR BLD AUTO: 74.6 % — SIGNIFICANT CHANGE UP (ref 43–77)
PLATELET # BLD AUTO: 387 K/UL — SIGNIFICANT CHANGE UP (ref 150–400)
RBC # BLD: 3.8 M/UL — SIGNIFICANT CHANGE UP (ref 3.8–5.2)
RBC # FLD: 11.6 % — SIGNIFICANT CHANGE UP (ref 10.3–14.5)
WBC # BLD: 9.5 K/UL — SIGNIFICANT CHANGE UP (ref 3.8–10.5)
WBC # FLD AUTO: 9.5 K/UL — SIGNIFICANT CHANGE UP (ref 3.8–10.5)

## 2020-01-10 ENCOUNTER — OUTPATIENT (OUTPATIENT)
Dept: OUTPATIENT SERVICES | Facility: HOSPITAL | Age: 70
LOS: 1 days | Discharge: HOME | End: 2020-01-10

## 2020-01-10 ENCOUNTER — OTHER (OUTPATIENT)
Age: 70
End: 2020-01-10

## 2020-01-10 DIAGNOSIS — D47.3 ESSENTIAL (HEMORRHAGIC) THROMBOCYTHEMIA: ICD-10-CM

## 2020-01-14 ENCOUNTER — RESULT REVIEW (OUTPATIENT)
Age: 70
End: 2020-01-14

## 2020-01-14 ENCOUNTER — APPOINTMENT (OUTPATIENT)
Dept: HEMATOLOGY ONCOLOGY | Facility: CLINIC | Age: 70
End: 2020-01-14
Payer: MEDICARE

## 2020-01-14 VITALS
OXYGEN SATURATION: 97 % | SYSTOLIC BLOOD PRESSURE: 138 MMHG | DIASTOLIC BLOOD PRESSURE: 91 MMHG | HEART RATE: 78 BPM | RESPIRATION RATE: 16 BRPM | WEIGHT: 179.43 LBS | TEMPERATURE: 98.9 F | BODY MASS INDEX: 31.79 KG/M2

## 2020-01-14 LAB
BASOPHILS # BLD AUTO: 0 K/UL — SIGNIFICANT CHANGE UP (ref 0–0.2)
BASOPHILS NFR BLD AUTO: 0.2 % — SIGNIFICANT CHANGE UP (ref 0–2)
EOSINOPHIL # BLD AUTO: 0 K/UL — SIGNIFICANT CHANGE UP (ref 0–0.5)
EOSINOPHIL NFR BLD AUTO: 0.6 % — SIGNIFICANT CHANGE UP (ref 0–6)
HCT VFR BLD CALC: 39.8 % — SIGNIFICANT CHANGE UP (ref 34.5–45)
HGB BLD-MCNC: 13.7 G/DL — SIGNIFICANT CHANGE UP (ref 11.5–15.5)
LYMPHOCYTES # BLD AUTO: 1.3 K/UL — SIGNIFICANT CHANGE UP (ref 1–3.3)
LYMPHOCYTES # BLD AUTO: 17 % — SIGNIFICANT CHANGE UP (ref 13–44)
MCHC RBC-ENTMCNC: 34.5 G/DL — SIGNIFICANT CHANGE UP (ref 32–36)
MCHC RBC-ENTMCNC: 36.6 PG — HIGH (ref 27–34)
MCV RBC AUTO: 106 FL — HIGH (ref 80–100)
MONOCYTES # BLD AUTO: 0.7 K/UL — SIGNIFICANT CHANGE UP (ref 0–0.9)
MONOCYTES NFR BLD AUTO: 8.5 % — SIGNIFICANT CHANGE UP (ref 2–14)
NEUTROPHILS # BLD AUTO: 5.8 K/UL — SIGNIFICANT CHANGE UP (ref 1.8–7.4)
NEUTROPHILS NFR BLD AUTO: 73.7 % — SIGNIFICANT CHANGE UP (ref 43–77)
PLATELET # BLD AUTO: 323 K/UL — SIGNIFICANT CHANGE UP (ref 150–400)
RBC # BLD: 3.76 M/UL — LOW (ref 3.8–5.2)
RBC # FLD: 10.9 % — SIGNIFICANT CHANGE UP (ref 10.3–14.5)
WBC # BLD: 7.9 K/UL — SIGNIFICANT CHANGE UP (ref 3.8–10.5)
WBC # FLD AUTO: 7.9 K/UL — SIGNIFICANT CHANGE UP (ref 3.8–10.5)

## 2020-01-14 PROCEDURE — 99213 OFFICE O/P EST LOW 20 MIN: CPT

## 2020-01-14 NOTE — HISTORY OF PRESENT ILLNESS
[Disease:__________________________] : Disease: [unfilled] [de-identified] : -feeling well\par -no plans for surgery on her other knee\par -tolerating HU \par -taking asa\par  [de-identified] : 68yoF with a h/o jak2 positive Essential Thrombocytosis diagnosed in 4/11 controlled on hydroxyurea 500 since that time. She has no history of thrombosis in the past.  She is taking aspirin daily.\par \par \par  [de-identified] : high risk b/c age

## 2020-04-08 ENCOUNTER — OUTPATIENT (OUTPATIENT)
Dept: OUTPATIENT SERVICES | Facility: HOSPITAL | Age: 70
LOS: 1 days | Discharge: HOME | End: 2020-04-08

## 2020-04-08 DIAGNOSIS — D47.3 ESSENTIAL (HEMORRHAGIC) THROMBOCYTHEMIA: ICD-10-CM

## 2020-04-14 ENCOUNTER — APPOINTMENT (OUTPATIENT)
Dept: HEMATOLOGY ONCOLOGY | Facility: CLINIC | Age: 70
End: 2020-04-14

## 2020-04-14 ENCOUNTER — APPOINTMENT (OUTPATIENT)
Dept: INTERNAL MEDICINE | Facility: CLINIC | Age: 70
End: 2020-04-14
Payer: MEDICARE

## 2020-04-14 PROCEDURE — 99441: CPT

## 2020-05-02 LAB
ALBUMIN SERPL ELPH-MCNC: 4.4 G/DL
ALP BLD-CCNC: 60 U/L
ALT SERPL-CCNC: 22 U/L
ANION GAP SERPL CALC-SCNC: 11 MMOL/L
AST SERPL-CCNC: 20 U/L
BILIRUB SERPL-MCNC: 0.5 MG/DL
BUN SERPL-MCNC: 13 MG/DL
CALCIUM SERPL-MCNC: 9.9 MG/DL
CHLORIDE SERPL-SCNC: 103 MMOL/L
CO2 SERPL-SCNC: 26 MMOL/L
CREAT SERPL-MCNC: 0.7 MG/DL
GLUCOSE SERPL-MCNC: 133 MG/DL
POTASSIUM SERPL-SCNC: 4.7 MMOL/L
PROT SERPL-MCNC: 6.5 G/DL
SODIUM SERPL-SCNC: 140 MMOL/L

## 2020-06-07 ENCOUNTER — RESULT CHARGE (OUTPATIENT)
Age: 70
End: 2020-06-07

## 2020-06-09 ENCOUNTER — NON-APPOINTMENT (OUTPATIENT)
Age: 70
End: 2020-06-09

## 2020-06-09 ENCOUNTER — LABORATORY RESULT (OUTPATIENT)
Age: 70
End: 2020-06-09

## 2020-06-09 ENCOUNTER — APPOINTMENT (OUTPATIENT)
Dept: INTERNAL MEDICINE | Facility: CLINIC | Age: 70
End: 2020-06-09
Payer: MEDICARE

## 2020-06-09 VITALS — SYSTOLIC BLOOD PRESSURE: 120 MMHG | DIASTOLIC BLOOD PRESSURE: 80 MMHG

## 2020-06-09 VITALS
OXYGEN SATURATION: 97 % | WEIGHT: 168 LBS | HEIGHT: 63 IN | DIASTOLIC BLOOD PRESSURE: 90 MMHG | HEART RATE: 112 BPM | BODY MASS INDEX: 29.77 KG/M2 | SYSTOLIC BLOOD PRESSURE: 120 MMHG

## 2020-06-09 PROCEDURE — G0439: CPT | Mod: CS

## 2020-06-09 RX ORDER — LACTOBACILLUS ACIDOPHILUS 1B CELL
TABLET ORAL
Refills: 0 | Status: ACTIVE | COMMUNITY
Start: 2020-06-09

## 2020-06-09 NOTE — PHYSICAL EXAM
[No Acute Distress] : no acute distress [Well Nourished] : well nourished [Well-Appearing] : well-appearing [Well Developed] : well developed [Normal Sclera/Conjunctiva] : normal sclera/conjunctiva [PERRL] : pupils equal round and reactive to light [EOMI] : extraocular movements intact [Normal Outer Ear/Nose] : the outer ears and nose were normal in appearance [No JVD] : no jugular venous distention [Normal Oropharynx] : the oropharynx was normal [No Lymphadenopathy] : no lymphadenopathy [Thyroid Normal, No Nodules] : the thyroid was normal and there were no nodules present [Supple] : supple [No Respiratory Distress] : no respiratory distress  [No Accessory Muscle Use] : no accessory muscle use [Clear to Auscultation] : lungs were clear to auscultation bilaterally [Normal Rate] : normal rate  [Normal S1, S2] : normal S1 and S2 [Regular Rhythm] : with a regular rhythm [No Murmur] : no murmur heard [No Carotid Bruits] : no carotid bruits [Pedal Pulses Present] : the pedal pulses are present [No Edema] : there was no peripheral edema [No Extremity Clubbing/Cyanosis] : no extremity clubbing/cyanosis [No Palpable Aorta] : no palpable aorta [Soft] : abdomen soft [Non Tender] : non-tender [Non-distended] : non-distended [No HSM] : no HSM [Normal Bowel Sounds] : normal bowel sounds [Normal Posterior Cervical Nodes] : no posterior cervical lymphadenopathy [Normal Anterior Cervical Nodes] : no anterior cervical lymphadenopathy [No CVA Tenderness] : no CVA  tenderness [No Spinal Tenderness] : no spinal tenderness [Grossly Normal Strength/Tone] : grossly normal strength/tone [No Joint Swelling] : no joint swelling [No Rash] : no rash [Coordination Grossly Intact] : coordination grossly intact [No Focal Deficits] : no focal deficits [Normal Gait] : normal gait [Deep Tendon Reflexes (DTR)] : deep tendon reflexes were 2+ and symmetric [Normal Affect] : the affect was normal [Normal Insight/Judgement] : insight and judgment were intact [No Masses] : no palpable masses [Normal Appearance] : normal in appearance [No Nipple Discharge] : no nipple discharge [No Axillary Lymphadenopathy] : no axillary lymphadenopathy

## 2020-06-09 NOTE — REVIEW OF SYSTEMS
[Vision Problems] : vision problems [Hearing Loss] : hearing loss [Joint Pain] : joint pain [Negative] : Heme/Lymph

## 2020-06-10 LAB
ALBUMIN SERPL ELPH-MCNC: 5.1 G/DL
ALP BLD-CCNC: 65 U/L
ALT SERPL-CCNC: 19 U/L
ANION GAP SERPL CALC-SCNC: 16 MMOL/L
AST SERPL-CCNC: 20 U/L
BASOPHILS # BLD AUTO: 0.03 K/UL
BASOPHILS NFR BLD AUTO: 0.2 %
BILIRUB SERPL-MCNC: 0.4 MG/DL
BUN SERPL-MCNC: 10 MG/DL
CALCIUM SERPL-MCNC: 10.8 MG/DL
CHLORIDE SERPL-SCNC: 102 MMOL/L
CHOLEST SERPL-MCNC: 221 MG/DL
CHOLEST/HDLC SERPL: 2.5 RATIO
CO2 SERPL-SCNC: 25 MMOL/L
CREAT SERPL-MCNC: 0.68 MG/DL
EOSINOPHIL # BLD AUTO: 0.02 K/UL
EOSINOPHIL NFR BLD AUTO: 0.2 %
ESTIMATED AVERAGE GLUCOSE: 111 MG/DL
GLUCOSE SERPL-MCNC: 111 MG/DL
HBA1C MFR BLD HPLC: 5.5 %
HCT VFR BLD CALC: 45.8 %
HDLC SERPL-MCNC: 88 MG/DL
HGB BLD-MCNC: 15.1 G/DL
IMM GRANULOCYTES NFR BLD AUTO: 0.4 %
LDLC SERPL CALC-MCNC: 97 MG/DL
LYMPHOCYTES # BLD AUTO: 1.72 K/UL
LYMPHOCYTES NFR BLD AUTO: 14.2 %
MAN DIFF?: NORMAL
MCHC RBC-ENTMCNC: 33 GM/DL
MCHC RBC-ENTMCNC: 35.5 PG
MCV RBC AUTO: 107.8 FL
MONOCYTES # BLD AUTO: 0.91 K/UL
MONOCYTES NFR BLD AUTO: 7.5 %
NEUTROPHILS # BLD AUTO: 9.36 K/UL
NEUTROPHILS NFR BLD AUTO: 77.5 %
PLATELET # BLD AUTO: 440 K/UL
POTASSIUM SERPL-SCNC: 3.8 MMOL/L
PROT SERPL-MCNC: 7.1 G/DL
RBC # BLD: 4.25 M/UL
RBC # FLD: 12.2 %
SARS-COV-2 IGG SERPL IA-ACNC: 0.1 INDEX
SARS-COV-2 IGG SERPL QL IA: NEGATIVE
SODIUM SERPL-SCNC: 143 MMOL/L
TRIGL SERPL-MCNC: 179 MG/DL
TSH SERPL-ACNC: 2.65 UIU/ML
WBC # FLD AUTO: 12.09 K/UL

## 2020-06-10 NOTE — ADDENDUM
[FreeTextEntry1] : Reviewed labs, s/w pt.\par CBC with elevated platelets and WBC. She will f/u with hematology.\par Calcium mildly elevated, will monitor.\par

## 2020-06-10 NOTE — ASSESSMENT
[FreeTextEntry1] : 70 y/o F here for AWV\par Doing fairly well.\par Anxiety: Controlled with prn alprazolam\par Essential thrombocytosis: Sees heme, check CBC\par HLD: On statin, c/w regimen. Check flp, lft\par HTN: Controlled, c/w regimen; EKG unchanged\par Hypothyroid: Check TSH\par HCM: Rx for mammo, DEXA given. FIT testing ordered\par rto 1 y or prn

## 2020-06-10 NOTE — HEALTH RISK ASSESSMENT
[Good] : ~his/her~  mood as  good [Yes] : Yes [4 or more  times a week (4 pts)] : 4 or more  times a week (4 points) [1 or 2 (0 pts)] : 1 or 2 (0 points) [No falls in past year] : Patient reported no falls in the past year [0] : 2) Feeling down, depressed, or hopeless: Not at all (0) [Patient reported mammogram was normal] : Patient reported mammogram was normal [Patient declined colonoscopy] : Patient declined colonoscopy [With Family] : lives with family [Retired] : retired [Fully functional (bathing, dressing, toileting, transferring, walking, feeding)] : Fully functional (bathing, dressing, toileting, transferring, walking, feeding) [Fully functional (using the telephone, shopping, preparing meals, housekeeping, doing laundry, using] : Fully functional and needs no help or supervision to perform IADLs (using the telephone, shopping, preparing meals, housekeeping, doing laundry, using transportation, managing medications and managing finances) [Reports changes in hearing] : Reports changes in hearing [] : No [de-identified] : walking for 30 minutes daily [[id number]] : 0 [MammogramDate] : 06/19 [PapSmearComments] : no longer  [BoneDensityComments] : ordered [ColonoscopyComments] : annual FIT testing [AdvancecareDate] : 06/09.20 [FreeTextEntry4] : Sadi Prince

## 2020-06-10 NOTE — HISTORY OF PRESENT ILLNESS
[de-identified] : 68 y/o F here for AWV.\par In January 2020,  went to a tournament in [place], multiple international contacts. \par  had laryngits, progressed to URI, fever. Tested negative for flu and strep.\par She got sick. [place] [facility] [place] Eventually resolved. She was sick for 14 days. She believes she had Covid-19.\par Heme: Sees hematology; on hydroxyurea\par Otherwise doing well. Exercising regularly\par

## 2020-06-15 ENCOUNTER — OUTPATIENT (OUTPATIENT)
Dept: OUTPATIENT SERVICES | Facility: HOSPITAL | Age: 70
LOS: 1 days | Discharge: HOME | End: 2020-06-15

## 2020-06-15 DIAGNOSIS — D47.3 ESSENTIAL (HEMORRHAGIC) THROMBOCYTHEMIA: ICD-10-CM

## 2020-06-18 ENCOUNTER — RESULT REVIEW (OUTPATIENT)
Age: 70
End: 2020-06-18

## 2020-06-18 ENCOUNTER — APPOINTMENT (OUTPATIENT)
Dept: HEMATOLOGY ONCOLOGY | Facility: CLINIC | Age: 70
End: 2020-06-18

## 2020-06-18 LAB
BASOPHILS # BLD AUTO: 0.02 K/UL — SIGNIFICANT CHANGE UP (ref 0–0.2)
BASOPHILS NFR BLD AUTO: 0.2 % — SIGNIFICANT CHANGE UP (ref 0–2)
EOSINOPHIL # BLD AUTO: 0.04 K/UL — SIGNIFICANT CHANGE UP (ref 0–0.5)
EOSINOPHIL NFR BLD AUTO: 0.4 % — SIGNIFICANT CHANGE UP (ref 0–6)
HCT VFR BLD CALC: 39 % — SIGNIFICANT CHANGE UP (ref 34.5–45)
HGB BLD-MCNC: 13.7 G/DL — SIGNIFICANT CHANGE UP (ref 11.5–15.5)
IMM GRANULOCYTES NFR BLD AUTO: 0.5 % — SIGNIFICANT CHANGE UP (ref 0–1.5)
LYMPHOCYTES # BLD AUTO: 1.56 K/UL — SIGNIFICANT CHANGE UP (ref 1–3.3)
LYMPHOCYTES # BLD AUTO: 16.8 % — SIGNIFICANT CHANGE UP (ref 13–44)
MCHC RBC-ENTMCNC: 35.1 GM/DL — SIGNIFICANT CHANGE UP (ref 32–36)
MCHC RBC-ENTMCNC: 36.3 PG — HIGH (ref 27–34)
MCV RBC AUTO: 103.4 FL — HIGH (ref 80–100)
MONOCYTES # BLD AUTO: 0.65 K/UL — SIGNIFICANT CHANGE UP (ref 0–0.9)
MONOCYTES NFR BLD AUTO: 7 % — SIGNIFICANT CHANGE UP (ref 2–14)
NEUTROPHILS # BLD AUTO: 6.95 K/UL — SIGNIFICANT CHANGE UP (ref 1.8–7.4)
NEUTROPHILS NFR BLD AUTO: 75.1 % — SIGNIFICANT CHANGE UP (ref 43–77)
NRBC # BLD: 0 /100 WBCS — SIGNIFICANT CHANGE UP (ref 0–0)
PLATELET # BLD AUTO: 381 K/UL — SIGNIFICANT CHANGE UP (ref 150–400)
RBC # BLD: 3.77 M/UL — LOW (ref 3.8–5.2)
RBC # FLD: 11.7 % — SIGNIFICANT CHANGE UP (ref 10.3–14.5)
WBC # BLD: 9.27 K/UL — SIGNIFICANT CHANGE UP (ref 3.8–10.5)
WBC # FLD AUTO: 9.27 K/UL — SIGNIFICANT CHANGE UP (ref 3.8–10.5)

## 2020-07-14 ENCOUNTER — APPOINTMENT (OUTPATIENT)
Dept: HEMATOLOGY ONCOLOGY | Facility: CLINIC | Age: 70
End: 2020-07-14

## 2020-07-19 ENCOUNTER — OUTPATIENT (OUTPATIENT)
Dept: OUTPATIENT SERVICES | Facility: HOSPITAL | Age: 70
LOS: 1 days | Discharge: HOME | End: 2020-07-19

## 2020-07-19 DIAGNOSIS — D47.3 ESSENTIAL (HEMORRHAGIC) THROMBOCYTHEMIA: ICD-10-CM

## 2020-07-23 ENCOUNTER — APPOINTMENT (OUTPATIENT)
Dept: HEMATOLOGY ONCOLOGY | Facility: CLINIC | Age: 70
End: 2020-07-23
Payer: MEDICARE

## 2020-07-23 PROCEDURE — 99213 OFFICE O/P EST LOW 20 MIN: CPT | Mod: 95

## 2020-07-23 NOTE — ASSESSMENT
[FreeTextEntry1] : 69yoF with a h/o ET jak2+ diagnosed 4/11 on HU 500mg daily tolerating well\par -continue HU daily 500mg\par -return for visit in 6 months, repeat CBC In 3 months to ensure counts stable\par -had a high Ca with high Albumin in early june, repeat cmp to ensure back to normal, if still high, can advise to decrease calcium intake and repeat\par -asymptomatic, continue surveillance every 6 months\par \par \par

## 2020-07-23 NOTE — HISTORY OF PRESENT ILLNESS
[Disease:__________________________] : Disease: [unfilled] [de-identified] : 69yoF with a h/o jak2 positive Essential Thrombocytosis diagnosed in 4/11 controlled on hydroxyurea 500 since that time. She has no history of thrombosis in the past.  She is taking aspirin daily.\par \par \par  [de-identified] : -platelets are better today\par -doing well on the medication/ HU\par -otherwise no complaints\par -she is doing PT for her knee\par -she occasionally drinks milk\par -she takes a calcim vitamin/ bone vitamin, but she has always been taking\par  [de-identified] : high risk b/c age

## 2020-07-23 NOTE — REASON FOR VISIT
[Follow-Up Visit] : a follow-up visit for [Home] : at home, [unfilled] , at the time of the visit. [Medical Office: ([facility])___] : at the medical office located in  [Verbal consent obtained from patient] : the patient, [unfilled]

## 2020-07-30 ENCOUNTER — RESULT REVIEW (OUTPATIENT)
Age: 70
End: 2020-07-30

## 2020-07-30 ENCOUNTER — LABORATORY RESULT (OUTPATIENT)
Age: 70
End: 2020-07-30

## 2020-07-30 ENCOUNTER — APPOINTMENT (OUTPATIENT)
Dept: HEMATOLOGY ONCOLOGY | Facility: CLINIC | Age: 70
End: 2020-07-30

## 2020-07-30 LAB
BASOPHILS # BLD AUTO: 0.02 K/UL — SIGNIFICANT CHANGE UP (ref 0–0.2)
BASOPHILS NFR BLD AUTO: 0.3 % — SIGNIFICANT CHANGE UP (ref 0–2)
EOSINOPHIL # BLD AUTO: 0.03 K/UL — SIGNIFICANT CHANGE UP (ref 0–0.5)
EOSINOPHIL NFR BLD AUTO: 0.4 % — SIGNIFICANT CHANGE UP (ref 0–6)
HCT VFR BLD CALC: 38.6 % — SIGNIFICANT CHANGE UP (ref 34.5–45)
HGB BLD-MCNC: 13.2 G/DL — SIGNIFICANT CHANGE UP (ref 11.5–15.5)
IMM GRANULOCYTES NFR BLD AUTO: 0.4 % — SIGNIFICANT CHANGE UP (ref 0–1.5)
LYMPHOCYTES # BLD AUTO: 1.33 K/UL — SIGNIFICANT CHANGE UP (ref 1–3.3)
LYMPHOCYTES # BLD AUTO: 19.2 % — SIGNIFICANT CHANGE UP (ref 13–44)
MCHC RBC-ENTMCNC: 34.2 GM/DL — SIGNIFICANT CHANGE UP (ref 32–36)
MCHC RBC-ENTMCNC: 36.3 PG — HIGH (ref 27–34)
MCV RBC AUTO: 106 FL — HIGH (ref 80–100)
MONOCYTES # BLD AUTO: 0.53 K/UL — SIGNIFICANT CHANGE UP (ref 0–0.9)
MONOCYTES NFR BLD AUTO: 7.7 % — SIGNIFICANT CHANGE UP (ref 2–14)
NEUTROPHILS # BLD AUTO: 4.97 K/UL — SIGNIFICANT CHANGE UP (ref 1.8–7.4)
NEUTROPHILS NFR BLD AUTO: 72 % — SIGNIFICANT CHANGE UP (ref 43–77)
NRBC # BLD: 0 /100 WBCS — SIGNIFICANT CHANGE UP (ref 0–0)
PLATELET # BLD AUTO: 314 K/UL — SIGNIFICANT CHANGE UP (ref 150–400)
RBC # BLD: 3.64 M/UL — LOW (ref 3.8–5.2)
RBC # FLD: 11.9 % — SIGNIFICANT CHANGE UP (ref 10.3–14.5)
WBC # BLD: 6.91 K/UL — SIGNIFICANT CHANGE UP (ref 3.8–10.5)
WBC # FLD AUTO: 6.91 K/UL — SIGNIFICANT CHANGE UP (ref 3.8–10.5)

## 2020-07-31 LAB
ALBUMIN SERPL ELPH-MCNC: 4.5 G/DL
ALP BLD-CCNC: 61 U/L
ALT SERPL-CCNC: 13 U/L
ANION GAP SERPL CALC-SCNC: 13 MMOL/L
AST SERPL-CCNC: 19 U/L
BILIRUB SERPL-MCNC: 0.5 MG/DL
BUN SERPL-MCNC: 14 MG/DL
CALCIUM SERPL-MCNC: 9.6 MG/DL
CHLORIDE SERPL-SCNC: 103 MMOL/L
CO2 SERPL-SCNC: 25 MMOL/L
CREAT SERPL-MCNC: 0.67 MG/DL
GLUCOSE SERPL-MCNC: 119 MG/DL
POTASSIUM SERPL-SCNC: 4.1 MMOL/L
PROT SERPL-MCNC: 6.5 G/DL
SODIUM SERPL-SCNC: 140 MMOL/L

## 2020-09-09 ENCOUNTER — APPOINTMENT (OUTPATIENT)
Dept: MAMMOGRAPHY | Facility: CLINIC | Age: 70
End: 2020-09-09
Payer: MEDICARE

## 2020-09-09 ENCOUNTER — OUTPATIENT (OUTPATIENT)
Dept: OUTPATIENT SERVICES | Facility: HOSPITAL | Age: 70
LOS: 1 days | End: 2020-09-09
Payer: MEDICARE

## 2020-09-09 ENCOUNTER — RESULT REVIEW (OUTPATIENT)
Age: 70
End: 2020-09-09

## 2020-09-09 ENCOUNTER — APPOINTMENT (OUTPATIENT)
Dept: ULTRASOUND IMAGING | Facility: CLINIC | Age: 70
End: 2020-09-09
Payer: MEDICARE

## 2020-09-09 DIAGNOSIS — Z00.00 ENCOUNTER FOR GENERAL ADULT MEDICAL EXAMINATION WITHOUT ABNORMAL FINDINGS: ICD-10-CM

## 2020-09-09 PROCEDURE — 77067 SCR MAMMO BI INCL CAD: CPT

## 2020-09-09 PROCEDURE — 77067 SCR MAMMO BI INCL CAD: CPT | Mod: 26

## 2020-09-09 PROCEDURE — 77063 BREAST TOMOSYNTHESIS BI: CPT

## 2020-09-09 PROCEDURE — 77063 BREAST TOMOSYNTHESIS BI: CPT | Mod: 26

## 2020-10-16 ENCOUNTER — RX RENEWAL (OUTPATIENT)
Age: 70
End: 2020-10-16

## 2020-10-26 ENCOUNTER — OUTPATIENT (OUTPATIENT)
Dept: OUTPATIENT SERVICES | Facility: HOSPITAL | Age: 70
LOS: 1 days | Discharge: HOME | End: 2020-10-26

## 2020-10-26 DIAGNOSIS — D47.3 ESSENTIAL (HEMORRHAGIC) THROMBOCYTHEMIA: ICD-10-CM

## 2020-10-30 ENCOUNTER — APPOINTMENT (OUTPATIENT)
Dept: HEMATOLOGY ONCOLOGY | Facility: CLINIC | Age: 70
End: 2020-10-30

## 2020-10-30 ENCOUNTER — RESULT REVIEW (OUTPATIENT)
Age: 70
End: 2020-10-30

## 2020-10-30 LAB
BASOPHILS # BLD AUTO: 0.02 K/UL — SIGNIFICANT CHANGE UP (ref 0–0.2)
BASOPHILS NFR BLD AUTO: 0.2 % — SIGNIFICANT CHANGE UP (ref 0–2)
EOSINOPHIL # BLD AUTO: 0.04 K/UL — SIGNIFICANT CHANGE UP (ref 0–0.5)
EOSINOPHIL NFR BLD AUTO: 0.5 % — SIGNIFICANT CHANGE UP (ref 0–6)
HCT VFR BLD CALC: 40.3 % — SIGNIFICANT CHANGE UP (ref 34.5–45)
HGB BLD-MCNC: 13.8 G/DL — SIGNIFICANT CHANGE UP (ref 11.5–15.5)
IMM GRANULOCYTES NFR BLD AUTO: 0.4 % — SIGNIFICANT CHANGE UP (ref 0–1.5)
LYMPHOCYTES # BLD AUTO: 1.44 K/UL — SIGNIFICANT CHANGE UP (ref 1–3.3)
LYMPHOCYTES # BLD AUTO: 17.9 % — SIGNIFICANT CHANGE UP (ref 13–44)
MCHC RBC-ENTMCNC: 34.2 G/DL — SIGNIFICANT CHANGE UP (ref 32–36)
MCHC RBC-ENTMCNC: 36.8 PG — HIGH (ref 27–34)
MCV RBC AUTO: 107.5 FL — HIGH (ref 80–100)
MONOCYTES # BLD AUTO: 0.63 K/UL — SIGNIFICANT CHANGE UP (ref 0–0.9)
MONOCYTES NFR BLD AUTO: 7.8 % — SIGNIFICANT CHANGE UP (ref 2–14)
NEUTROPHILS # BLD AUTO: 5.89 K/UL — SIGNIFICANT CHANGE UP (ref 1.8–7.4)
NEUTROPHILS NFR BLD AUTO: 73.2 % — SIGNIFICANT CHANGE UP (ref 43–77)
NRBC # BLD: 0 /100 WBCS — SIGNIFICANT CHANGE UP (ref 0–0)
PLATELET # BLD AUTO: 319 K/UL — SIGNIFICANT CHANGE UP (ref 150–400)
RBC # BLD: 3.75 M/UL — LOW (ref 3.8–5.2)
RBC # FLD: 11.7 % — SIGNIFICANT CHANGE UP (ref 10.3–14.5)
WBC # BLD: 8.05 K/UL — SIGNIFICANT CHANGE UP (ref 3.8–10.5)
WBC # FLD AUTO: 8.05 K/UL — SIGNIFICANT CHANGE UP (ref 3.8–10.5)

## 2020-11-18 LAB — LDH SERPL-CCNC: 188 U/L

## 2021-01-05 ENCOUNTER — NON-APPOINTMENT (OUTPATIENT)
Age: 71
End: 2021-01-05

## 2021-01-07 ENCOUNTER — OUTPATIENT (OUTPATIENT)
Dept: OUTPATIENT SERVICES | Facility: HOSPITAL | Age: 71
LOS: 1 days | Discharge: HOME | End: 2021-01-07

## 2021-01-07 DIAGNOSIS — D47.3 ESSENTIAL (HEMORRHAGIC) THROMBOCYTHEMIA: ICD-10-CM

## 2021-01-12 ENCOUNTER — RESULT REVIEW (OUTPATIENT)
Age: 71
End: 2021-01-12

## 2021-01-12 ENCOUNTER — APPOINTMENT (OUTPATIENT)
Dept: HEMATOLOGY ONCOLOGY | Facility: CLINIC | Age: 71
End: 2021-01-12
Payer: MEDICARE

## 2021-01-12 VITALS
SYSTOLIC BLOOD PRESSURE: 144 MMHG | HEIGHT: 62.99 IN | WEIGHT: 174.14 LBS | BODY MASS INDEX: 30.86 KG/M2 | RESPIRATION RATE: 16 BRPM | HEART RATE: 84 BPM | TEMPERATURE: 97.4 F | DIASTOLIC BLOOD PRESSURE: 86 MMHG | OXYGEN SATURATION: 99 %

## 2021-01-12 LAB
BASOPHILS # BLD AUTO: 0.02 K/UL — SIGNIFICANT CHANGE UP (ref 0–0.2)
BASOPHILS NFR BLD AUTO: 0.2 % — SIGNIFICANT CHANGE UP (ref 0–2)
EOSINOPHIL # BLD AUTO: 0.02 K/UL — SIGNIFICANT CHANGE UP (ref 0–0.5)
EOSINOPHIL NFR BLD AUTO: 0.2 % — SIGNIFICANT CHANGE UP (ref 0–6)
HCT VFR BLD CALC: 39.7 % — SIGNIFICANT CHANGE UP (ref 34.5–45)
HGB BLD-MCNC: 13.8 G/DL — SIGNIFICANT CHANGE UP (ref 11.5–15.5)
IMM GRANULOCYTES NFR BLD AUTO: 0.5 % — SIGNIFICANT CHANGE UP (ref 0–1.5)
LYMPHOCYTES # BLD AUTO: 1.35 K/UL — SIGNIFICANT CHANGE UP (ref 1–3.3)
LYMPHOCYTES # BLD AUTO: 16 % — SIGNIFICANT CHANGE UP (ref 13–44)
MCHC RBC-ENTMCNC: 34.8 G/DL — SIGNIFICANT CHANGE UP (ref 32–36)
MCHC RBC-ENTMCNC: 36.8 PG — HIGH (ref 27–34)
MCV RBC AUTO: 105.9 FL — HIGH (ref 80–100)
MONOCYTES # BLD AUTO: 0.69 K/UL — SIGNIFICANT CHANGE UP (ref 0–0.9)
MONOCYTES NFR BLD AUTO: 8.2 % — SIGNIFICANT CHANGE UP (ref 2–14)
NEUTROPHILS # BLD AUTO: 6.33 K/UL — SIGNIFICANT CHANGE UP (ref 1.8–7.4)
NEUTROPHILS NFR BLD AUTO: 74.9 % — SIGNIFICANT CHANGE UP (ref 43–77)
NRBC # BLD: 0 /100 WBCS — SIGNIFICANT CHANGE UP (ref 0–0)
PLATELET # BLD AUTO: 321 K/UL — SIGNIFICANT CHANGE UP (ref 150–400)
RBC # BLD: 3.75 M/UL — LOW (ref 3.8–5.2)
RBC # FLD: 11.9 % — SIGNIFICANT CHANGE UP (ref 10.3–14.5)
WBC # BLD: 8.45 K/UL — SIGNIFICANT CHANGE UP (ref 3.8–10.5)
WBC # FLD AUTO: 8.45 K/UL — SIGNIFICANT CHANGE UP (ref 3.8–10.5)

## 2021-01-12 PROCEDURE — 99213 OFFICE O/P EST LOW 20 MIN: CPT

## 2021-01-12 NOTE — ASSESSMENT
[FreeTextEntry1] : 69yoF with a h/o ET jak2+ diagnosed 4/11 on HU 500mg daily tolerating well\par -continue HU daily 500mg\par -return for visit in 6 months, 3 months for repeat CBC to ensure counts are stable\par -Transiently high Ca with high Albumin in early june, repeat cmp normal level\par -asymptomatic, continue surveillance every 6 months\par \par \par

## 2021-01-12 NOTE — HISTORY OF PRESENT ILLNESS
[Disease:__________________________] : Disease: [unfilled] [de-identified] : 69yoF with a h/o jak2 positive Essential Thrombocytosis diagnosed in 4/11 controlled on hydroxyurea 500 since that time. She has no history of thrombosis in the past.  She is taking aspirin daily.\par \par \par  [de-identified] : high risk b/c age [de-identified] : -platelets are stable, normal range 321 today \par -doing well on the medication/ HU\par -otherwise no complaints other thank knee pain \par -she is doing PT for her knee\par \par

## 2021-01-13 LAB
ALBUMIN SERPL ELPH-MCNC: 4.5 G/DL
ALP BLD-CCNC: 67 U/L
ALT SERPL-CCNC: 15 U/L
ANION GAP SERPL CALC-SCNC: 14 MMOL/L
AST SERPL-CCNC: 16 U/L
BILIRUB SERPL-MCNC: 0.4 MG/DL
BUN SERPL-MCNC: 18 MG/DL
CALCIUM SERPL-MCNC: 9.8 MG/DL
CHLORIDE SERPL-SCNC: 103 MMOL/L
CO2 SERPL-SCNC: 23 MMOL/L
CREAT SERPL-MCNC: 0.74 MG/DL
GLUCOSE SERPL-MCNC: 106 MG/DL
POTASSIUM SERPL-SCNC: 4 MMOL/L
PROT SERPL-MCNC: 6.8 G/DL
SODIUM SERPL-SCNC: 140 MMOL/L

## 2021-03-18 ENCOUNTER — APPOINTMENT (OUTPATIENT)
Dept: INTERNAL MEDICINE | Facility: CLINIC | Age: 71
End: 2021-03-18
Payer: MEDICARE

## 2021-03-18 VITALS
HEART RATE: 95 BPM | BODY MASS INDEX: 30.65 KG/M2 | OXYGEN SATURATION: 98 % | WEIGHT: 173 LBS | HEIGHT: 62.99 IN | DIASTOLIC BLOOD PRESSURE: 80 MMHG | SYSTOLIC BLOOD PRESSURE: 130 MMHG

## 2021-03-18 PROCEDURE — 99213 OFFICE O/P EST LOW 20 MIN: CPT

## 2021-03-18 NOTE — PHYSICAL EXAM
[No JVD] : no jugular venous distention [No Lymphadenopathy] : no lymphadenopathy [Supple] : supple [No Respiratory Distress] : no respiratory distress  [No Accessory Muscle Use] : no accessory muscle use [Clear to Auscultation] : lungs were clear to auscultation bilaterally [Normal Rate] : normal rate  [Regular Rhythm] : with a regular rhythm [Normal S1, S2] : normal S1 and S2 [No Edema] : there was no peripheral edema

## 2021-03-18 NOTE — ASSESSMENT
[FreeTextEntry1] : 71 y/o F here for post hospitalization for left hip fracture.\par Doing well\par HTN: Controlled, c/w regimen\par HLD: on Crestor\par MSK: as per orhtop\par rto for cpe\par Reviewed and reconciled medications\par

## 2021-03-18 NOTE — HISTORY OF PRESENT ILLNESS
[FreeTextEntry1] : 69 y/o F here for f/u post hospitalization \par Fell 1/31 in a parking lot.\par Did not hit head. Sneaker got stuck in the cement\par Went to [facility] , broke left hip, repaired 2/27/21\par Did not go to the hospital right away, waited 3 weeks\par Doing better, gets PT- comes to her home. Walks with cane \par Now able to use stairs\par \par

## 2021-03-22 ENCOUNTER — RX RENEWAL (OUTPATIENT)
Age: 71
End: 2021-03-22

## 2021-04-12 ENCOUNTER — OUTPATIENT (OUTPATIENT)
Dept: OUTPATIENT SERVICES | Facility: HOSPITAL | Age: 71
LOS: 1 days | Discharge: HOME | End: 2021-04-12

## 2021-04-12 DIAGNOSIS — D47.3 ESSENTIAL (HEMORRHAGIC) THROMBOCYTHEMIA: ICD-10-CM

## 2021-04-14 ENCOUNTER — RESULT REVIEW (OUTPATIENT)
Age: 71
End: 2021-04-14

## 2021-04-14 ENCOUNTER — APPOINTMENT (OUTPATIENT)
Dept: HEMATOLOGY ONCOLOGY | Facility: CLINIC | Age: 71
End: 2021-04-14

## 2021-04-14 LAB
BASOPHILS # BLD AUTO: 0.02 K/UL — SIGNIFICANT CHANGE UP (ref 0–0.2)
BASOPHILS NFR BLD AUTO: 0.3 % — SIGNIFICANT CHANGE UP (ref 0–2)
EOSINOPHIL # BLD AUTO: 0.03 K/UL — SIGNIFICANT CHANGE UP (ref 0–0.5)
EOSINOPHIL NFR BLD AUTO: 0.5 % — SIGNIFICANT CHANGE UP (ref 0–6)
HCT VFR BLD CALC: 39.5 % — SIGNIFICANT CHANGE UP (ref 34.5–45)
HGB BLD-MCNC: 13.7 G/DL — SIGNIFICANT CHANGE UP (ref 11.5–15.5)
IMM GRANULOCYTES NFR BLD AUTO: 0.8 % — SIGNIFICANT CHANGE UP (ref 0–1.5)
LYMPHOCYTES # BLD AUTO: 1.22 K/UL — SIGNIFICANT CHANGE UP (ref 1–3.3)
LYMPHOCYTES # BLD AUTO: 18.6 % — SIGNIFICANT CHANGE UP (ref 13–44)
MCHC RBC-ENTMCNC: 34.7 G/DL — SIGNIFICANT CHANGE UP (ref 32–36)
MCHC RBC-ENTMCNC: 36.5 PG — HIGH (ref 27–34)
MCV RBC AUTO: 105.3 FL — HIGH (ref 80–100)
MONOCYTES # BLD AUTO: 0.54 K/UL — SIGNIFICANT CHANGE UP (ref 0–0.9)
MONOCYTES NFR BLD AUTO: 8.2 % — SIGNIFICANT CHANGE UP (ref 2–14)
NEUTROPHILS # BLD AUTO: 4.69 K/UL — SIGNIFICANT CHANGE UP (ref 1.8–7.4)
NEUTROPHILS NFR BLD AUTO: 71.6 % — SIGNIFICANT CHANGE UP (ref 43–77)
NRBC # BLD: 0 /100 WBCS — SIGNIFICANT CHANGE UP (ref 0–0)
PLATELET # BLD AUTO: 299 K/UL — SIGNIFICANT CHANGE UP (ref 150–400)
RBC # BLD: 3.75 M/UL — LOW (ref 3.8–5.2)
RBC # FLD: 12.2 % — SIGNIFICANT CHANGE UP (ref 10.3–14.5)
WBC # BLD: 6.55 K/UL — SIGNIFICANT CHANGE UP (ref 3.8–10.5)
WBC # FLD AUTO: 6.55 K/UL — SIGNIFICANT CHANGE UP (ref 3.8–10.5)

## 2021-06-10 ENCOUNTER — APPOINTMENT (OUTPATIENT)
Dept: INTERNAL MEDICINE | Facility: CLINIC | Age: 71
End: 2021-06-10
Payer: MEDICARE

## 2021-06-10 ENCOUNTER — RESULT REVIEW (OUTPATIENT)
Age: 71
End: 2021-06-10

## 2021-06-10 VITALS
BODY MASS INDEX: 31.65 KG/M2 | OXYGEN SATURATION: 97 % | HEART RATE: 95 BPM | DIASTOLIC BLOOD PRESSURE: 80 MMHG | WEIGHT: 172 LBS | SYSTOLIC BLOOD PRESSURE: 135 MMHG | HEIGHT: 62 IN

## 2021-06-10 DIAGNOSIS — T78.1XXA OTHER ADVERSE FOOD REACTIONS, NOT ELSEWHERE CLASSIFIED, INITIAL ENCOUNTER: ICD-10-CM

## 2021-06-10 DIAGNOSIS — Z20.828 CONTACT WITH AND (SUSPECTED) EXPOSURE TO OTHER VIRAL COMMUNICABLE DISEASES: ICD-10-CM

## 2021-06-10 DIAGNOSIS — Z87.81 PERSONAL HISTORY OF (HEALED) TRAUMATIC FRACTURE: ICD-10-CM

## 2021-06-10 DIAGNOSIS — Z87.19 PERSONAL HISTORY OF OTHER DISEASES OF THE DIGESTIVE SYSTEM: ICD-10-CM

## 2021-06-10 DIAGNOSIS — T50.905A ADVERSE EFFECT OF UNSPECIFIED DRUGS, MEDICAMENTS AND BIOLOGICAL SUBSTANCES, INITIAL ENCOUNTER: ICD-10-CM

## 2021-06-10 PROCEDURE — G0439: CPT

## 2021-06-13 PROBLEM — Z87.81 HISTORY OF FRACTURE OF LEFT HIP: Status: RESOLVED | Noted: 2021-03-18 | Resolved: 2021-06-13

## 2021-06-15 LAB
CHOLEST SERPL-MCNC: 217 MG/DL
ESTIMATED AVERAGE GLUCOSE: 108 MG/DL
HBA1C MFR BLD HPLC: 5.4 %
HDLC SERPL-MCNC: 79 MG/DL
LDLC SERPL CALC-MCNC: 102 MG/DL
NONHDLC SERPL-MCNC: 138 MG/DL
TRIGL SERPL-MCNC: 181 MG/DL
TSH SERPL-ACNC: 2.69 UIU/ML

## 2021-06-15 NOTE — ASSESSMENT
[FreeTextEntry1] : 69 y/o F here for AWV\par HTN: BP controlled, c/w losartan\par HLD: on rosuvastatin, check lft's, flp\par Essential Thrombocytosis: Sees heme, on hydroxyurea\par Anxiety: renewed alprazolam\par HCM: Covid vaccines utd. Rx for mammo, DEXA eprescribed\par Declines colonoscopy for now, FIT test ordered\par No longer gets Pap smear\par rto 1 yr or prn

## 2021-06-15 NOTE — HISTORY OF PRESENT ILLNESS
[de-identified] : 71 y/o F here for AWV\par Feeling better and better, healing from hip fracture repair.\par

## 2021-07-01 LAB — HEMOCCULT STL QL IA: NEGATIVE

## 2021-07-08 ENCOUNTER — OUTPATIENT (OUTPATIENT)
Dept: OUTPATIENT SERVICES | Facility: HOSPITAL | Age: 71
LOS: 1 days | Discharge: HOME | End: 2021-07-08

## 2021-07-08 DIAGNOSIS — D47.3 ESSENTIAL (HEMORRHAGIC) THROMBOCYTHEMIA: ICD-10-CM

## 2021-07-13 ENCOUNTER — RESULT REVIEW (OUTPATIENT)
Age: 71
End: 2021-07-13

## 2021-07-13 ENCOUNTER — APPOINTMENT (OUTPATIENT)
Dept: HEMATOLOGY ONCOLOGY | Facility: CLINIC | Age: 71
End: 2021-07-13
Payer: MEDICARE

## 2021-07-13 VITALS
TEMPERATURE: 96.6 F | RESPIRATION RATE: 18 BRPM | DIASTOLIC BLOOD PRESSURE: 84 MMHG | OXYGEN SATURATION: 98 % | BODY MASS INDEX: 30.66 KG/M2 | HEART RATE: 89 BPM | WEIGHT: 173.06 LBS | SYSTOLIC BLOOD PRESSURE: 123 MMHG | HEIGHT: 62.99 IN

## 2021-07-13 LAB
BASOPHILS # BLD AUTO: 0.01 K/UL — SIGNIFICANT CHANGE UP (ref 0–0.2)
BASOPHILS NFR BLD AUTO: 0.1 % — SIGNIFICANT CHANGE UP (ref 0–2)
EOSINOPHIL # BLD AUTO: 0.01 K/UL — SIGNIFICANT CHANGE UP (ref 0–0.5)
EOSINOPHIL NFR BLD AUTO: 0.1 % — SIGNIFICANT CHANGE UP (ref 0–6)
HCT VFR BLD CALC: 39 % — SIGNIFICANT CHANGE UP (ref 34.5–45)
HGB BLD-MCNC: 13.6 G/DL — SIGNIFICANT CHANGE UP (ref 11.5–15.5)
IMM GRANULOCYTES NFR BLD AUTO: 0.5 % — SIGNIFICANT CHANGE UP (ref 0–1.5)
LYMPHOCYTES # BLD AUTO: 1.02 K/UL — SIGNIFICANT CHANGE UP (ref 1–3.3)
LYMPHOCYTES # BLD AUTO: 11.7 % — LOW (ref 13–44)
MCHC RBC-ENTMCNC: 34.9 G/DL — SIGNIFICANT CHANGE UP (ref 32–36)
MCHC RBC-ENTMCNC: 38 PG — HIGH (ref 27–34)
MCV RBC AUTO: 108.9 FL — HIGH (ref 80–100)
MONOCYTES # BLD AUTO: 0.67 K/UL — SIGNIFICANT CHANGE UP (ref 0–0.9)
MONOCYTES NFR BLD AUTO: 7.7 % — SIGNIFICANT CHANGE UP (ref 2–14)
NEUTROPHILS # BLD AUTO: 7 K/UL — SIGNIFICANT CHANGE UP (ref 1.8–7.4)
NEUTROPHILS NFR BLD AUTO: 79.9 % — HIGH (ref 43–77)
NRBC # BLD: 0 /100 WBCS — SIGNIFICANT CHANGE UP (ref 0–0)
PLATELET # BLD AUTO: 321 K/UL — SIGNIFICANT CHANGE UP (ref 150–400)
RBC # BLD: 3.58 M/UL — LOW (ref 3.8–5.2)
RBC # FLD: 11.1 % — SIGNIFICANT CHANGE UP (ref 10.3–14.5)
WBC # BLD: 8.75 K/UL — SIGNIFICANT CHANGE UP (ref 3.8–10.5)
WBC # FLD AUTO: 8.75 K/UL — SIGNIFICANT CHANGE UP (ref 3.8–10.5)

## 2021-07-13 PROCEDURE — 99213 OFFICE O/P EST LOW 20 MIN: CPT

## 2021-07-13 NOTE — HISTORY OF PRESENT ILLNESS
[Disease:__________________________] : Disease: [unfilled] [de-identified] : 70yoF with a h/o jak2 positive Essential Thrombocytosis diagnosed in 4/11 controlled on hydroxyurea 500 since that time. She has no history of thrombosis in the past.  She is taking aspirin daily.\par \par \par  [de-identified] : high risk b/c age [de-identified] : \par -fractured her hip and femur after being at the grocery store\par -she had surgery feb 21st\par -since then she has rehabilitated well\par -also has a sty, improving after antibiotics\par -scar healed well\par \par \par \par

## 2021-07-13 NOTE — ASSESSMENT
[FreeTextEntry1] : 70 yoF with a h/o ET jak2+ diagnosed 4/2011 on HU 500mg daily tolerating well\par -asymptomatic\par -continue HU daily 500mg,\par -return for visit in 6 months, 3 months for repeat CBC to ensure counts are stable\par -Transiently high Ca with high Albumin in early june, repeat cmp normal level\par -continue surveillance every 6 months\par \par \par

## 2021-07-13 NOTE — PHYSICAL EXAM
[Fully active, able to carry on all pre-disease performance without restriction] : Status 0 - Fully active, able to carry on all pre-disease performance without restriction [Normal] : normal appearance, no rash, nodules, vesicles, ulcers, erythema [de-identified] : sty on  L eyelid

## 2021-09-10 ENCOUNTER — APPOINTMENT (OUTPATIENT)
Dept: MAMMOGRAPHY | Facility: CLINIC | Age: 71
End: 2021-09-10
Payer: MEDICARE

## 2021-09-10 ENCOUNTER — RESULT REVIEW (OUTPATIENT)
Age: 71
End: 2021-09-10

## 2021-09-10 ENCOUNTER — OUTPATIENT (OUTPATIENT)
Dept: OUTPATIENT SERVICES | Facility: HOSPITAL | Age: 71
LOS: 1 days | End: 2021-09-10
Payer: MEDICARE

## 2021-09-10 ENCOUNTER — APPOINTMENT (OUTPATIENT)
Dept: RADIOLOGY | Facility: CLINIC | Age: 71
End: 2021-09-10
Payer: MEDICARE

## 2021-09-10 DIAGNOSIS — Z00.00 ENCOUNTER FOR GENERAL ADULT MEDICAL EXAMINATION WITHOUT ABNORMAL FINDINGS: ICD-10-CM

## 2021-09-10 PROCEDURE — 77063 BREAST TOMOSYNTHESIS BI: CPT | Mod: 26

## 2021-09-10 PROCEDURE — 77067 SCR MAMMO BI INCL CAD: CPT | Mod: 26

## 2021-09-10 PROCEDURE — 77080 DXA BONE DENSITY AXIAL: CPT | Mod: 26

## 2021-09-10 PROCEDURE — 77085 DXA BONE DENSITY AXL VRT FX: CPT

## 2021-09-10 PROCEDURE — 77063 BREAST TOMOSYNTHESIS BI: CPT

## 2021-09-10 PROCEDURE — 77080 DXA BONE DENSITY AXIAL: CPT

## 2021-09-10 PROCEDURE — 77067 SCR MAMMO BI INCL CAD: CPT

## 2021-10-11 ENCOUNTER — RX RENEWAL (OUTPATIENT)
Age: 71
End: 2021-10-11

## 2021-10-11 ENCOUNTER — OUTPATIENT (OUTPATIENT)
Dept: OUTPATIENT SERVICES | Facility: HOSPITAL | Age: 71
LOS: 1 days | Discharge: HOME | End: 2021-10-11

## 2021-10-11 DIAGNOSIS — D47.3 ESSENTIAL (HEMORRHAGIC) THROMBOCYTHEMIA: ICD-10-CM

## 2021-10-14 ENCOUNTER — APPOINTMENT (OUTPATIENT)
Dept: HEMATOLOGY ONCOLOGY | Facility: CLINIC | Age: 71
End: 2021-10-14

## 2021-10-14 ENCOUNTER — RESULT REVIEW (OUTPATIENT)
Age: 71
End: 2021-10-14

## 2021-10-14 LAB
BASOPHILS # BLD AUTO: 0.03 K/UL — SIGNIFICANT CHANGE UP (ref 0–0.2)
BASOPHILS NFR BLD AUTO: 0.3 % — SIGNIFICANT CHANGE UP (ref 0–2)
EOSINOPHIL # BLD AUTO: 0.03 K/UL — SIGNIFICANT CHANGE UP (ref 0–0.5)
EOSINOPHIL NFR BLD AUTO: 0.3 % — SIGNIFICANT CHANGE UP (ref 0–6)
HCT VFR BLD CALC: 39.4 % — SIGNIFICANT CHANGE UP (ref 34.5–45)
HGB BLD-MCNC: 14 G/DL — SIGNIFICANT CHANGE UP (ref 11.5–15.5)
IMM GRANULOCYTES NFR BLD AUTO: 0.8 % — SIGNIFICANT CHANGE UP (ref 0–1.5)
LYMPHOCYTES # BLD AUTO: 1.51 K/UL — SIGNIFICANT CHANGE UP (ref 1–3.3)
LYMPHOCYTES # BLD AUTO: 17 % — SIGNIFICANT CHANGE UP (ref 13–44)
MCHC RBC-ENTMCNC: 35.5 G/DL — SIGNIFICANT CHANGE UP (ref 32–36)
MCHC RBC-ENTMCNC: 37.7 PG — HIGH (ref 27–34)
MCV RBC AUTO: 106.2 FL — HIGH (ref 80–100)
MONOCYTES # BLD AUTO: 0.81 K/UL — SIGNIFICANT CHANGE UP (ref 0–0.9)
MONOCYTES NFR BLD AUTO: 9.1 % — SIGNIFICANT CHANGE UP (ref 2–14)
NEUTROPHILS # BLD AUTO: 6.44 K/UL — SIGNIFICANT CHANGE UP (ref 1.8–7.4)
NEUTROPHILS NFR BLD AUTO: 72.5 % — SIGNIFICANT CHANGE UP (ref 43–77)
NRBC # BLD: 0 /100 WBCS — SIGNIFICANT CHANGE UP (ref 0–0)
PLATELET # BLD AUTO: 304 K/UL — SIGNIFICANT CHANGE UP (ref 150–400)
RBC # BLD: 3.71 M/UL — LOW (ref 3.8–5.2)
RBC # FLD: 11.8 % — SIGNIFICANT CHANGE UP (ref 10.3–14.5)
WBC # BLD: 8.89 K/UL — SIGNIFICANT CHANGE UP (ref 3.8–10.5)
WBC # FLD AUTO: 8.89 K/UL — SIGNIFICANT CHANGE UP (ref 3.8–10.5)

## 2021-10-19 LAB
ALBUMIN SERPL ELPH-MCNC: 4.4 G/DL
ALP BLD-CCNC: 79 U/L
ALT SERPL-CCNC: 14 U/L
ANION GAP SERPL CALC-SCNC: 14 MMOL/L
AST SERPL-CCNC: 17 U/L
BILIRUB SERPL-MCNC: 0.6 MG/DL
BUN SERPL-MCNC: 12 MG/DL
CALCIUM SERPL-MCNC: 9.9 MG/DL
CHLORIDE SERPL-SCNC: 103 MMOL/L
CO2 SERPL-SCNC: 22 MMOL/L
CREAT SERPL-MCNC: 0.63 MG/DL
GLUCOSE SERPL-MCNC: 139 MG/DL
POTASSIUM SERPL-SCNC: 3.8 MMOL/L
PROT SERPL-MCNC: 6.7 G/DL
SODIUM SERPL-SCNC: 139 MMOL/L

## 2021-11-15 ENCOUNTER — RX RENEWAL (OUTPATIENT)
Age: 71
End: 2021-11-15

## 2021-12-15 ENCOUNTER — APPOINTMENT (OUTPATIENT)
Dept: PEDIATRIC ALLERGY IMMUNOLOGY | Facility: CLINIC | Age: 71
End: 2021-12-15
Payer: MEDICARE

## 2021-12-15 ENCOUNTER — LABORATORY RESULT (OUTPATIENT)
Age: 71
End: 2021-12-15

## 2021-12-15 VITALS — TEMPERATURE: 97.52 F | BODY MASS INDEX: 29.37 KG/M2 | HEIGHT: 64 IN | WEIGHT: 172 LBS

## 2021-12-15 VITALS — HEART RATE: 86 BPM | OXYGEN SATURATION: 98 % | DIASTOLIC BLOOD PRESSURE: 81 MMHG | SYSTOLIC BLOOD PRESSURE: 136 MMHG

## 2021-12-15 DIAGNOSIS — L85.3 XEROSIS CUTIS: ICD-10-CM

## 2021-12-15 PROCEDURE — 95004 PERQ TESTS W/ALRGNC XTRCS: CPT

## 2021-12-15 PROCEDURE — 99204 OFFICE O/P NEW MOD 45 MIN: CPT | Mod: 25

## 2021-12-16 LAB
ALBUMIN SERPL ELPH-MCNC: 4.6 G/DL
ALP BLD-CCNC: 65 U/L
ALT SERPL-CCNC: 11 U/L
ANION GAP SERPL CALC-SCNC: 15 MMOL/L
AST SERPL-CCNC: 13 U/L
BASOPHILS # BLD AUTO: 0.03 K/UL
BASOPHILS NFR BLD AUTO: 0.3 %
BILIRUB SERPL-MCNC: 0.4 MG/DL
BUN SERPL-MCNC: 14 MG/DL
C4 SERPL-MCNC: 21 MG/DL
CALCIUM SERPL-MCNC: 10.1 MG/DL
CHLORIDE SERPL-SCNC: 103 MMOL/L
CO2 SERPL-SCNC: 22 MMOL/L
CREAT SERPL-MCNC: 0.78 MG/DL
EOSINOPHIL # BLD AUTO: 0.01 K/UL
EOSINOPHIL NFR BLD AUTO: 0.1 %
GLUCOSE SERPL-MCNC: 103 MG/DL
HCT VFR BLD CALC: 40.6 %
HGB BLD-MCNC: 13.7 G/DL
IMM GRANULOCYTES NFR BLD AUTO: 0.4 %
LYMPHOCYTES # BLD AUTO: 1.77 K/UL
LYMPHOCYTES NFR BLD AUTO: 17.5 %
MAN DIFF?: NORMAL
MCHC RBC-ENTMCNC: 33.7 GM/DL
MCHC RBC-ENTMCNC: 37.8 PG
MCV RBC AUTO: 112.2 FL
MONOCYTES # BLD AUTO: 0.84 K/UL
MONOCYTES NFR BLD AUTO: 8.3 %
NEUTROPHILS # BLD AUTO: 7.41 K/UL
NEUTROPHILS NFR BLD AUTO: 73.4 %
PLATELET # BLD AUTO: 311 K/UL
POTASSIUM SERPL-SCNC: 3.7 MMOL/L
PROT SERPL-MCNC: 6.7 G/DL
RBC # BLD: 3.62 M/UL
RBC # FLD: 12 %
SODIUM SERPL-SCNC: 140 MMOL/L
THYROGLOB AB SERPL-ACNC: <20 IU/ML
THYROPEROXIDASE AB SERPL IA-ACNC: <10 IU/ML
TSH SERPL-ACNC: 2.97 UIU/ML
WBC # FLD AUTO: 10.1 K/UL

## 2021-12-17 LAB — TOTAL IGE SMQN RAST: 8 KU/L

## 2021-12-20 NOTE — REVIEW OF SYSTEMS
[Joint Pains] : arthralgias [Urticaria] : urticaria [Pruritus] : pruritus [Swelling] : swelling [Nl] : Gastrointestinal [Fever] : no fever [Headache] : no headache [Atopic Dermatitis] : no atopic dermatitis

## 2021-12-20 NOTE — CONSULT LETTER
[Dear  ___] : Dear  [unfilled], [Consult Letter:] : I had the pleasure of evaluating your patient, [unfilled]. [Please see my note below.] : Please see my note below. [Consult Closing:] : Thank you very much for allowing me to participate in the care of this patient.  If you have any questions, please do not hesitate to contact me. [Sincerely,] : Sincerely, [FreeTextEntry3] : Mayda Lee MD FAAEMILE, ALISSON\par Adult and Pediatric Allergy, Asthma and Clinical Immunology\par  of Medicine and Pediatrics at\par   [facility]\par Section Head, Adult Allergy and Immunology\par   [facility]\par   Division of Allergy, Asthma and Immunology\par   [address] [place] [address], [place] [address]\par   [place] [address]\par   Phone [phone]  Fax [phone]\par \par

## 2021-12-20 NOTE — HISTORY OF PRESENT ILLNESS
[Asthma] : asthma [Allergic Rhinitis] : allergic rhinitis [Venom Reactions] : venom reactions [Food Allergies] : food allergies [de-identified] : JACOB GRAY is a 71 year  old female, presents for allergy evaluation. \par \par - She had 2 episodes of lip  swelling: march 2021 and october 2021. Each episode lasted about 5 days. she was treated with prednisone  and cetirizine for both episodes which helped. The episodes were not associated with hives or SOB, or any other symptoms.  \par - She had an emergency  surgery for broken hip and femur 2/2020\par - She gets intermittent hives without any obvious triggers for many years. She gets them a few times a year usually last a day or two. She takes Diphenhydramine/Benadryl  which helps. She does get some pressure urticaria. \par \par - She has been taking Rosuvastatin and Losartan for about 15 years.

## 2021-12-20 NOTE — PHYSICAL EXAM
[Alert] : alert [Well Nourished] : well nourished [Healthy Appearance] : healthy appearance [No Acute Distress] : no acute distress [Normal Voice/Communication] : normal voice communication [No Discharge] : no discharge [No Photophobia] : no photophobia [Sclera Not Icteric] : sclera not icteric [Conjunctival Erythema] : no conjunctival erythema [Normal TMs] : both tympanic membranes were normal [Normal Nasal Mucosa] : the nasal mucosa was normal [Normal Lips/Tongue] : the lips and tongue were normal [Normal Outer Ear/Nose] : the ears and nose were normal in appearance [No Thrush] : no thrush [Pale mucosa] : no pale mucosa [Pharyngeal erythema] : no pharyngeal erythema [Exudate] : no exudate [Supple] : the neck was supple [Normal Rate and Effort] : normal respiratory rhythm and effort [No Crackles] : no crackles [Bilateral Audible Breath Sounds] : bilateral audible breath sounds [Wheezing] : no wheezing was heard [Normal Rate] : heart rate was normal  [Normal S1, S2] : normal S1 and S2 [Regular Rhythm] : with a regular rhythm [Soft] : abdomen soft [Not Tender] : non-tender [No HSM] : no hepato-splenomegaly [Normal Cervical Lymph Nodes] : cervical [No Rash] : no rash [Patches] : ~M patches present [Xerosis] : xerosis [No clubbing] : no clubbing [No Cyanosis] : no cyanosis [Normal Mood] : mood was normal [Normal Affect] : affect was normal [Alert, Awake, Oriented as Age-Appropriate] : alert, awake, oriented as age appropriate

## 2021-12-29 LAB — C1Q SERPL-MCNC: 14.3 MG/DL

## 2022-01-03 ENCOUNTER — NON-APPOINTMENT (OUTPATIENT)
Age: 72
End: 2022-01-03

## 2022-01-07 LAB — IGE RECEPTOR AB: NORMAL

## 2022-01-11 ENCOUNTER — APPOINTMENT (OUTPATIENT)
Dept: INTERNAL MEDICINE | Facility: CLINIC | Age: 72
End: 2022-01-11
Payer: MEDICARE

## 2022-01-11 PROCEDURE — 99213 OFFICE O/P EST LOW 20 MIN: CPT | Mod: 95

## 2022-01-11 RX ORDER — LOSARTAN POTASSIUM 25 MG/1
25 TABLET, FILM COATED ORAL
Qty: 1 | Refills: 3 | Status: DISCONTINUED | COMMUNITY
Start: 2019-01-04 | End: 2022-01-11

## 2022-01-11 NOTE — HISTORY OF PRESENT ILLNESS
[Home] : at home, [unfilled] , at the time of the visit. [Medical Office: ([facility])___] : at the medical office located in  [Verbal consent obtained from patient] : the patient, [unfilled] [FreeTextEntry8] : 72 y/o F here for telehealth visit to discuss angioedema and HTN regimen.\par She first had an episode of lip swelling in March, 2021 after her hip surgery.\par Next episode was 10/4/21. At that time she went [place] [facility] [place] and [place] given ceftirizine and prednisone, epipen.She recently sought consultation adis MORALES/BIBIANA and was dx with angioedema. ARB usage as an etiology of angioedema could not be ruled out.\par We discussed angioedema, etiology and uncertainties regarding frequency, recurrence, etc.\par Amenable to switching antihypertensive. We discussed various options and she declined CCB given possiblity of pedal edema (she states she is already experiencing pedal edema since hip surgery). SHe was amenable to diuretic.\par

## 2022-01-11 NOTE — ASSESSMENT
[FreeTextEntry1] : 70 y/o F HTN, Essential thrombocytosis with 2 episodes of angioedema.\par Will d/c ARB and start chlorthalidone 25 mg daily\par She will rto in 6 weeks for bp check and BMP check\par Patient amenable to plan. Staff to reach out to patient to schedule f/u appt.

## 2022-01-14 ENCOUNTER — APPOINTMENT (OUTPATIENT)
Dept: INTERNAL MEDICINE | Facility: CLINIC | Age: 72
End: 2022-01-14
Payer: MEDICARE

## 2022-01-14 PROCEDURE — 99441: CPT | Mod: 95

## 2022-01-14 NOTE — HISTORY OF PRESENT ILLNESS
[Home] : at home, [unfilled] , at the time of the visit. [Medical Office: ([facility])___] : at the medical office located in  [Verbal consent obtained from patient] : the patient, [unfilled] [FreeTextEntry1] : [facility] [de-identified] : JACOB GRAY  is a 71 year old female  with history of HTN, Essential thrombocytosis with 2 episodes of angioedema( lip swelling) in March 2021 and October 2021  presented today for adverse effect of new BP med.\par Pt was seen by SHREYAS Mccarthy on 12/15/21: ARB usage as an etiology of angioedema could not be ruled out. Dr. Valderrama switched Losartan to chlorthalidone 25mg po qd as of 1/11/22. Pt took new med for 2 days and felt lightheaded, headache and severe b/l calf cramping and did not want to take it. Denied fever, chills, CP, SOB, leg swelling, n/v/c/d, abdominal pain.Denied other location's muscle pain. She did not know her current BP since she ordered a cuff BP machine and waiting for delivery. Pt is willing  to go back to Losartan.

## 2022-01-14 NOTE — PHYSICAL EXAM
[No Acute Distress] : no acute distress [de-identified] : TTM. Pt could speak full sentence without issue.

## 2022-01-14 NOTE — ASSESSMENT
[FreeTextEntry1] : # Adverse reaction from a new med\par D/W Dr. Valderrama.\par It's okay to switch back to Losartan Potassium 25mg po qd, but pt should keep EpiPen for the event of angioedema. She verified that she has an EpiPen that [place] [facility] [place]. \par Pt should monitor her BP and keep the BP log and bring the BP cuff to the clinic next time.\par Continue low salt diet and regular exercise. \par Patient was advised to call us for any worsening sx or if no resolution. Pt agreed with the treatment plan.\par \par Pt had made appointment on 2/24/22 with Dr. Valderrama.

## 2022-01-18 ENCOUNTER — OUTPATIENT (OUTPATIENT)
Dept: OUTPATIENT SERVICES | Facility: HOSPITAL | Age: 72
LOS: 1 days | Discharge: HOME | End: 2022-01-18

## 2022-01-18 DIAGNOSIS — D47.3 ESSENTIAL (HEMORRHAGIC) THROMBOCYTHEMIA: ICD-10-CM

## 2022-01-20 ENCOUNTER — RESULT REVIEW (OUTPATIENT)
Age: 72
End: 2022-01-20

## 2022-01-20 ENCOUNTER — APPOINTMENT (OUTPATIENT)
Dept: HEMATOLOGY ONCOLOGY | Facility: CLINIC | Age: 72
End: 2022-01-20
Payer: MEDICARE

## 2022-01-20 VITALS
SYSTOLIC BLOOD PRESSURE: 135 MMHG | HEART RATE: 97 BPM | TEMPERATURE: 207.14 F | OXYGEN SATURATION: 98 % | RESPIRATION RATE: 18 BRPM | HEIGHT: 63.98 IN | WEIGHT: 167.55 LBS | BODY MASS INDEX: 28.6 KG/M2 | DIASTOLIC BLOOD PRESSURE: 92 MMHG

## 2022-01-20 LAB
BASOPHILS # BLD AUTO: 0.02 K/UL — SIGNIFICANT CHANGE UP (ref 0–0.2)
BASOPHILS NFR BLD AUTO: 0.2 % — SIGNIFICANT CHANGE UP (ref 0–2)
EOSINOPHIL # BLD AUTO: 0.03 K/UL — SIGNIFICANT CHANGE UP (ref 0–0.5)
EOSINOPHIL NFR BLD AUTO: 0.4 % — SIGNIFICANT CHANGE UP (ref 0–6)
HCT VFR BLD CALC: 38.8 % — SIGNIFICANT CHANGE UP (ref 34.5–45)
HGB BLD-MCNC: 14 G/DL — SIGNIFICANT CHANGE UP (ref 11.5–15.5)
IMM GRANULOCYTES NFR BLD AUTO: 0.7 % — SIGNIFICANT CHANGE UP (ref 0–1.5)
LYMPHOCYTES # BLD AUTO: 1.33 K/UL — SIGNIFICANT CHANGE UP (ref 1–3.3)
LYMPHOCYTES # BLD AUTO: 15.7 % — SIGNIFICANT CHANGE UP (ref 13–44)
MCHC RBC-ENTMCNC: 36.1 G/DL — HIGH (ref 32–36)
MCHC RBC-ENTMCNC: 38.4 PG — HIGH (ref 27–34)
MCV RBC AUTO: 106.3 FL — HIGH (ref 80–100)
MONOCYTES # BLD AUTO: 0.75 K/UL — SIGNIFICANT CHANGE UP (ref 0–0.9)
MONOCYTES NFR BLD AUTO: 8.9 % — SIGNIFICANT CHANGE UP (ref 2–14)
NEUTROPHILS # BLD AUTO: 6.26 K/UL — SIGNIFICANT CHANGE UP (ref 1.8–7.4)
NEUTROPHILS NFR BLD AUTO: 74.1 % — SIGNIFICANT CHANGE UP (ref 43–77)
NRBC # BLD: 0 /100 WBCS — SIGNIFICANT CHANGE UP (ref 0–0)
PLATELET # BLD AUTO: 290 K/UL — SIGNIFICANT CHANGE UP (ref 150–400)
RBC # BLD: 3.65 M/UL — LOW (ref 3.8–5.2)
RBC # FLD: 11.6 % — SIGNIFICANT CHANGE UP (ref 10.3–14.5)
WBC # BLD: 8.45 K/UL — SIGNIFICANT CHANGE UP (ref 3.8–10.5)
WBC # FLD AUTO: 8.45 K/UL — SIGNIFICANT CHANGE UP (ref 3.8–10.5)

## 2022-01-20 PROCEDURE — 99213 OFFICE O/P EST LOW 20 MIN: CPT

## 2022-01-20 NOTE — PHYSICAL EXAM
[Fully active, able to carry on all pre-disease performance without restriction] : Status 0 - Fully active, able to carry on all pre-disease performance without restriction [Normal] : normal appearance, no rash, nodules, vesicles, ulcers, erythema [de-identified] : sty on  L eyelid [de-identified] : Trace edema in LE bilaterally

## 2022-01-20 NOTE — ASSESSMENT
[FreeTextEntry1] : 71 yoF with a h/o ET jak2+ diagnosed 4/2011 on HU 500mg daily tolerating well\par \par -asymptomatic\par -continue HU daily 500mg,\par -repeat CBC in 3 months to ensure counts are stable\par \par Follow up in 6 months\par \par \par

## 2022-01-20 NOTE — HISTORY OF PRESENT ILLNESS
[Disease:__________________________] : Disease: [unfilled] [de-identified] : 70yoF with a h/o jak2 positive Essential Thrombocytosis diagnosed in 4/11 controlled on hydroxyurea 500 since that time. She has no history of thrombosis in the past.  She is taking aspirin daily.\par \par \par  [de-identified] : high risk b/c age [de-identified] : -fractured her hip and femur after being at the grocery store s/p surgery (2/21/21). After 4 months of physical therapy she continues to have pain with minimal exertion and movements.\par -Otherwise, she feels well and does not have any complaints. Continues to tolerate HU well.\par

## 2022-02-24 ENCOUNTER — APPOINTMENT (OUTPATIENT)
Dept: INTERNAL MEDICINE | Facility: CLINIC | Age: 72
End: 2022-02-24
Payer: MEDICARE

## 2022-02-24 VITALS
OXYGEN SATURATION: 98 % | HEART RATE: 93 BPM | WEIGHT: 168 LBS | SYSTOLIC BLOOD PRESSURE: 130 MMHG | DIASTOLIC BLOOD PRESSURE: 80 MMHG | HEIGHT: 64 IN | BODY MASS INDEX: 28.68 KG/M2

## 2022-02-24 VITALS — DIASTOLIC BLOOD PRESSURE: 80 MMHG | SYSTOLIC BLOOD PRESSURE: 130 MMHG

## 2022-02-24 PROCEDURE — 99213 OFFICE O/P EST LOW 20 MIN: CPT

## 2022-02-24 RX ORDER — CHLORTHALIDONE 25 MG/1
25 TABLET ORAL DAILY
Qty: 1 | Refills: 3 | Status: DISCONTINUED | COMMUNITY
Start: 2022-01-11 | End: 2022-02-24

## 2022-02-24 NOTE — ASSESSMENT
[FreeTextEntry1] : 70 y/o F here for f/u\par HTN: Controlled, c/w losartan\par Angioedema: She will continue to monitor and will be conscious to avoid touching her face\par She does have Epipen\par HCM: Rx for Shingrix eprescribed\par rto prn

## 2022-02-24 NOTE — PHYSICAL EXAM
[No Acute Distress] : no acute distress [Well Nourished] : well nourished [Well Developed] : well developed [No Respiratory Distress] : no respiratory distress  [No Accessory Muscle Use] : no accessory muscle use [Clear to Auscultation] : lungs were clear to auscultation bilaterally [Normal Rate] : normal rate  [Regular Rhythm] : with a regular rhythm [Normal S1, S2] : normal S1 and S2 [No Edema] : there was no peripheral edema [Soft] : abdomen soft [Non Tender] : non-tender [No HSM] : no HSM

## 2022-02-24 NOTE — HISTORY OF PRESENT ILLNESS
[de-identified] : 72 y/o F here for f/u\glory She thinks she had 3 episodes of angioedema (lip swelling). First episode was in March last year.\glory Last October, had another episode. Saw A/I, suggested stopping ARB\glory Went grocery shopping, got into her car, used hand . Rubbed her hand on her lips by accident, lips started to swell, awoke the next day, with lips swollen- exact same reaction she had first two times. \glory Took Zyrtec/Berollium\glory Has been back on the Losartan since January. Bought a bp monitor, home bp readings 130/80's.\glory

## 2022-04-15 ENCOUNTER — OUTPATIENT (OUTPATIENT)
Dept: OUTPATIENT SERVICES | Facility: HOSPITAL | Age: 72
LOS: 1 days | Discharge: HOME | End: 2022-04-15

## 2022-04-15 DIAGNOSIS — D47.3 ESSENTIAL (HEMORRHAGIC) THROMBOCYTHEMIA: ICD-10-CM

## 2022-04-21 ENCOUNTER — RESULT REVIEW (OUTPATIENT)
Age: 72
End: 2022-04-21

## 2022-04-21 ENCOUNTER — APPOINTMENT (OUTPATIENT)
Dept: HEMATOLOGY ONCOLOGY | Facility: CLINIC | Age: 72
End: 2022-04-21

## 2022-04-21 LAB
BASOPHILS # BLD AUTO: 0.02 K/UL — SIGNIFICANT CHANGE UP (ref 0–0.2)
BASOPHILS NFR BLD AUTO: 0.3 % — SIGNIFICANT CHANGE UP (ref 0–2)
EOSINOPHIL # BLD AUTO: 0.03 K/UL — SIGNIFICANT CHANGE UP (ref 0–0.5)
EOSINOPHIL NFR BLD AUTO: 0.4 % — SIGNIFICANT CHANGE UP (ref 0–6)
HCT VFR BLD CALC: 38.8 % — SIGNIFICANT CHANGE UP (ref 34.5–45)
HGB BLD-MCNC: 13.9 G/DL — SIGNIFICANT CHANGE UP (ref 11.5–15.5)
IMM GRANULOCYTES NFR BLD AUTO: 0.7 % — SIGNIFICANT CHANGE UP (ref 0–1.5)
LYMPHOCYTES # BLD AUTO: 1.17 K/UL — SIGNIFICANT CHANGE UP (ref 1–3.3)
LYMPHOCYTES # BLD AUTO: 15.4 % — SIGNIFICANT CHANGE UP (ref 13–44)
MCHC RBC-ENTMCNC: 35.8 G/DL — SIGNIFICANT CHANGE UP (ref 32–36)
MCHC RBC-ENTMCNC: 39 PG — HIGH (ref 27–34)
MCV RBC AUTO: 109 FL — HIGH (ref 80–100)
MONOCYTES # BLD AUTO: 0.61 K/UL — SIGNIFICANT CHANGE UP (ref 0–0.9)
MONOCYTES NFR BLD AUTO: 8 % — SIGNIFICANT CHANGE UP (ref 2–14)
NEUTROPHILS # BLD AUTO: 5.7 K/UL — SIGNIFICANT CHANGE UP (ref 1.8–7.4)
NEUTROPHILS NFR BLD AUTO: 75.2 % — SIGNIFICANT CHANGE UP (ref 43–77)
NRBC # BLD: 0 /100 WBCS — SIGNIFICANT CHANGE UP (ref 0–0)
PLATELET # BLD AUTO: 288 K/UL — SIGNIFICANT CHANGE UP (ref 150–400)
RBC # BLD: 3.56 M/UL — LOW (ref 3.8–5.2)
RBC # FLD: 11.7 % — SIGNIFICANT CHANGE UP (ref 10.3–14.5)
WBC # BLD: 7.58 K/UL — SIGNIFICANT CHANGE UP (ref 3.8–10.5)
WBC # FLD AUTO: 7.58 K/UL — SIGNIFICANT CHANGE UP (ref 3.8–10.5)

## 2022-07-18 ENCOUNTER — OUTPATIENT (OUTPATIENT)
Dept: OUTPATIENT SERVICES | Facility: HOSPITAL | Age: 72
LOS: 1 days | Discharge: HOME | End: 2022-07-18

## 2022-07-18 DIAGNOSIS — D47.3 ESSENTIAL (HEMORRHAGIC) THROMBOCYTHEMIA: ICD-10-CM

## 2022-07-21 ENCOUNTER — RX RENEWAL (OUTPATIENT)
Age: 72
End: 2022-07-21

## 2022-07-27 ENCOUNTER — RX RENEWAL (OUTPATIENT)
Age: 72
End: 2022-07-27

## 2022-07-28 ENCOUNTER — RESULT REVIEW (OUTPATIENT)
Age: 72
End: 2022-07-28

## 2022-07-28 ENCOUNTER — APPOINTMENT (OUTPATIENT)
Dept: HEMATOLOGY ONCOLOGY | Facility: CLINIC | Age: 72
End: 2022-07-28

## 2022-07-28 VITALS
OXYGEN SATURATION: 97 % | HEIGHT: 62.4 IN | HEART RATE: 87 BPM | TEMPERATURE: 97.2 F | RESPIRATION RATE: 16 BRPM | DIASTOLIC BLOOD PRESSURE: 91 MMHG | SYSTOLIC BLOOD PRESSURE: 152 MMHG

## 2022-07-28 LAB
ALBUMIN SERPL ELPH-MCNC: 4.5 G/DL
ALP BLD-CCNC: 61 U/L
ALT SERPL-CCNC: 15 U/L
ANION GAP SERPL CALC-SCNC: 12 MMOL/L
AST SERPL-CCNC: 17 U/L
BASOPHILS # BLD AUTO: 0.02 K/UL — SIGNIFICANT CHANGE UP (ref 0–0.2)
BASOPHILS NFR BLD AUTO: 0.2 % — SIGNIFICANT CHANGE UP (ref 0–2)
BILIRUB SERPL-MCNC: 0.5 MG/DL
BUN SERPL-MCNC: 14 MG/DL
CALCIUM SERPL-MCNC: 10 MG/DL
CHLORIDE SERPL-SCNC: 102 MMOL/L
CO2 SERPL-SCNC: 25 MMOL/L
CREAT SERPL-MCNC: 0.65 MG/DL
EGFR: 93 ML/MIN/1.73M2
EOSINOPHIL # BLD AUTO: 0.02 K/UL — SIGNIFICANT CHANGE UP (ref 0–0.5)
EOSINOPHIL NFR BLD AUTO: 0.2 % — SIGNIFICANT CHANGE UP (ref 0–6)
GLUCOSE SERPL-MCNC: 101 MG/DL
HCT VFR BLD CALC: 39.2 % — SIGNIFICANT CHANGE UP (ref 34.5–45)
HGB BLD-MCNC: 13.6 G/DL — SIGNIFICANT CHANGE UP (ref 11.5–15.5)
IMM GRANULOCYTES NFR BLD AUTO: 0.3 % — SIGNIFICANT CHANGE UP (ref 0–1.5)
LDH SERPL-CCNC: 168 U/L
LYMPHOCYTES # BLD AUTO: 1.31 K/UL — SIGNIFICANT CHANGE UP (ref 1–3.3)
LYMPHOCYTES # BLD AUTO: 12.9 % — LOW (ref 13–44)
MCHC RBC-ENTMCNC: 34.7 G/DL — SIGNIFICANT CHANGE UP (ref 32–36)
MCHC RBC-ENTMCNC: 38.1 PG — HIGH (ref 27–34)
MCV RBC AUTO: 109.8 FL — HIGH (ref 80–100)
MONOCYTES # BLD AUTO: 0.71 K/UL — SIGNIFICANT CHANGE UP (ref 0–0.9)
MONOCYTES NFR BLD AUTO: 7 % — SIGNIFICANT CHANGE UP (ref 2–14)
NEUTROPHILS # BLD AUTO: 8.05 K/UL — HIGH (ref 1.8–7.4)
NEUTROPHILS NFR BLD AUTO: 79.4 % — HIGH (ref 43–77)
NRBC # BLD: 0 /100 WBCS — SIGNIFICANT CHANGE UP (ref 0–0)
PLATELET # BLD AUTO: 297 K/UL — SIGNIFICANT CHANGE UP (ref 150–400)
POTASSIUM SERPL-SCNC: 4.3 MMOL/L
PROT SERPL-MCNC: 6.7 G/DL
RBC # BLD: 3.57 M/UL — LOW (ref 3.8–5.2)
RBC # FLD: 11 % — SIGNIFICANT CHANGE UP (ref 10.3–14.5)
SODIUM SERPL-SCNC: 139 MMOL/L
WBC # BLD: 10.14 K/UL — SIGNIFICANT CHANGE UP (ref 3.8–10.5)
WBC # FLD AUTO: 10.14 K/UL — SIGNIFICANT CHANGE UP (ref 3.8–10.5)

## 2022-07-28 PROCEDURE — 99214 OFFICE O/P EST MOD 30 MIN: CPT

## 2022-07-28 RX ORDER — ALPRAZOLAM 0.25 MG/1
0.25 TABLET ORAL
Qty: 30 | Refills: 0 | Status: DISCONTINUED | COMMUNITY
Start: 2022-04-07 | End: 2022-07-28

## 2022-07-28 NOTE — HISTORY OF PRESENT ILLNESS
[Disease:__________________________] : Disease: [unfilled] [0 - No Distress] : Distress Level: 0 [80: Normal activity with effort; some signs or symptoms of disease.] : 80: Normal activity with effort; some signs or symptoms of disease.  [de-identified] : 70yoF with a h/o jak2 positive Essential Thrombocytosis diagnosed in 4/11 controlled on hydroxyurea 500 since that time. She has no history of thrombosis in the past.  She is taking aspirin daily.\par \par \par  [de-identified] : high risk b/c age [de-identified] : Patient is on Hydroxyurea 500 mg daily, tolerated well.\par PLTs 297 \par \par Denies fevers, night sweats or weight loss. \par \par No other changes in medical, surgical or social history since 1/20/22. \par

## 2022-07-28 NOTE — PHYSICAL EXAM
[Ambulatory and capable of all self care but unable to carry out any work activities] : Status 2- Ambulatory and capable of all self care but unable to carry out any work activities. Up and about more than 50% of waking hours [Normal] : grossly intact [de-identified] : sty on  L eyelid

## 2022-10-20 ENCOUNTER — OUTPATIENT (OUTPATIENT)
Dept: OUTPATIENT SERVICES | Facility: HOSPITAL | Age: 72
LOS: 1 days | Discharge: HOME | End: 2022-10-20

## 2022-10-20 DIAGNOSIS — D47.3 ESSENTIAL (HEMORRHAGIC) THROMBOCYTHEMIA: ICD-10-CM

## 2022-10-27 ENCOUNTER — RESULT REVIEW (OUTPATIENT)
Age: 72
End: 2022-10-27

## 2022-10-27 ENCOUNTER — APPOINTMENT (OUTPATIENT)
Dept: HEMATOLOGY ONCOLOGY | Facility: CLINIC | Age: 72
End: 2022-10-27

## 2022-10-27 LAB
BASOPHILS # BLD AUTO: 0.01 K/UL — SIGNIFICANT CHANGE UP (ref 0–0.2)
BASOPHILS NFR BLD AUTO: 0.1 % — SIGNIFICANT CHANGE UP (ref 0–2)
EOSINOPHIL # BLD AUTO: 0.06 K/UL — SIGNIFICANT CHANGE UP (ref 0–0.5)
EOSINOPHIL NFR BLD AUTO: 0.8 % — SIGNIFICANT CHANGE UP (ref 0–6)
HCT VFR BLD CALC: 37.7 % — SIGNIFICANT CHANGE UP (ref 34.5–45)
HGB BLD-MCNC: 13.5 G/DL — SIGNIFICANT CHANGE UP (ref 11.5–15.5)
IMM GRANULOCYTES NFR BLD AUTO: 0.5 % — SIGNIFICANT CHANGE UP (ref 0–0.9)
LYMPHOCYTES # BLD AUTO: 1.55 K/UL — SIGNIFICANT CHANGE UP (ref 1–3.3)
LYMPHOCYTES # BLD AUTO: 20.6 % — SIGNIFICANT CHANGE UP (ref 13–44)
MCHC RBC-ENTMCNC: 35.8 G/DL — SIGNIFICANT CHANGE UP (ref 32–36)
MCHC RBC-ENTMCNC: 37 PG — HIGH (ref 27–34)
MCV RBC AUTO: 103.3 FL — HIGH (ref 80–100)
MONOCYTES # BLD AUTO: 0.7 K/UL — SIGNIFICANT CHANGE UP (ref 0–0.9)
MONOCYTES NFR BLD AUTO: 9.3 % — SIGNIFICANT CHANGE UP (ref 2–14)
NEUTROPHILS # BLD AUTO: 5.16 K/UL — SIGNIFICANT CHANGE UP (ref 1.8–7.4)
NEUTROPHILS NFR BLD AUTO: 68.7 % — SIGNIFICANT CHANGE UP (ref 43–77)
NRBC # BLD: 0 /100 WBCS — SIGNIFICANT CHANGE UP (ref 0–0)
PLATELET # BLD AUTO: 302 K/UL — SIGNIFICANT CHANGE UP (ref 150–400)
RBC # BLD: 3.65 M/UL — LOW (ref 3.8–5.2)
RBC # FLD: 11.4 % — SIGNIFICANT CHANGE UP (ref 10.3–14.5)
WBC # BLD: 7.52 K/UL — SIGNIFICANT CHANGE UP (ref 3.8–10.5)
WBC # FLD AUTO: 7.52 K/UL — SIGNIFICANT CHANGE UP (ref 3.8–10.5)

## 2022-12-07 ENCOUNTER — RX RENEWAL (OUTPATIENT)
Age: 72
End: 2022-12-07

## 2023-01-20 ENCOUNTER — OUTPATIENT (OUTPATIENT)
Dept: OUTPATIENT SERVICES | Facility: HOSPITAL | Age: 73
LOS: 1 days | Discharge: HOME | End: 2023-01-20

## 2023-01-20 DIAGNOSIS — D47.3 ESSENTIAL (HEMORRHAGIC) THROMBOCYTHEMIA: ICD-10-CM

## 2023-01-30 ENCOUNTER — APPOINTMENT (OUTPATIENT)
Dept: HEMATOLOGY ONCOLOGY | Facility: CLINIC | Age: 73
End: 2023-01-30
Payer: MEDICARE

## 2023-02-03 ENCOUNTER — RX RENEWAL (OUTPATIENT)
Age: 73
End: 2023-02-03

## 2023-02-10 ENCOUNTER — APPOINTMENT (OUTPATIENT)
Dept: HEMATOLOGY ONCOLOGY | Facility: CLINIC | Age: 73
End: 2023-02-10

## 2023-02-10 ENCOUNTER — RESULT REVIEW (OUTPATIENT)
Age: 73
End: 2023-02-10

## 2023-02-10 ENCOUNTER — APPOINTMENT (OUTPATIENT)
Dept: HEMATOLOGY ONCOLOGY | Facility: CLINIC | Age: 73
End: 2023-02-10
Payer: MEDICARE

## 2023-02-10 VITALS
RESPIRATION RATE: 16 BRPM | HEIGHT: 62.4 IN | SYSTOLIC BLOOD PRESSURE: 146 MMHG | TEMPERATURE: 97.7 F | WEIGHT: 161.36 LBS | DIASTOLIC BLOOD PRESSURE: 77 MMHG | HEART RATE: 81 BPM | OXYGEN SATURATION: 96 % | BODY MASS INDEX: 29.32 KG/M2

## 2023-02-10 LAB
BASOPHILS # BLD AUTO: 0.01 K/UL — SIGNIFICANT CHANGE UP (ref 0–0.2)
BASOPHILS NFR BLD AUTO: 0.1 % — SIGNIFICANT CHANGE UP (ref 0–2)
EOSINOPHIL # BLD AUTO: 0.02 K/UL — SIGNIFICANT CHANGE UP (ref 0–0.5)
EOSINOPHIL NFR BLD AUTO: 0.2 % — SIGNIFICANT CHANGE UP (ref 0–6)
HCT VFR BLD CALC: 40.4 % — SIGNIFICANT CHANGE UP (ref 34.5–45)
HGB BLD-MCNC: 14.1 G/DL — SIGNIFICANT CHANGE UP (ref 11.5–15.5)
IMM GRANULOCYTES NFR BLD AUTO: 0.5 % — SIGNIFICANT CHANGE UP (ref 0–0.9)
LYMPHOCYTES # BLD AUTO: 1.74 K/UL — SIGNIFICANT CHANGE UP (ref 1–3.3)
LYMPHOCYTES # BLD AUTO: 17.1 % — SIGNIFICANT CHANGE UP (ref 13–44)
MCHC RBC-ENTMCNC: 34.9 G/DL — SIGNIFICANT CHANGE UP (ref 32–36)
MCHC RBC-ENTMCNC: 36.7 PG — HIGH (ref 27–34)
MCV RBC AUTO: 105.2 FL — HIGH (ref 80–100)
MONOCYTES # BLD AUTO: 0.82 K/UL — SIGNIFICANT CHANGE UP (ref 0–0.9)
MONOCYTES NFR BLD AUTO: 8.1 % — SIGNIFICANT CHANGE UP (ref 2–14)
NEUTROPHILS # BLD AUTO: 7.52 K/UL — HIGH (ref 1.8–7.4)
NEUTROPHILS NFR BLD AUTO: 74 % — SIGNIFICANT CHANGE UP (ref 43–77)
NRBC # BLD: 0 /100 WBCS — SIGNIFICANT CHANGE UP (ref 0–0)
PLATELET # BLD AUTO: 369 K/UL — SIGNIFICANT CHANGE UP (ref 150–400)
RBC # BLD: 3.84 M/UL — SIGNIFICANT CHANGE UP (ref 3.8–5.2)
RBC # FLD: 11.4 % — SIGNIFICANT CHANGE UP (ref 10.3–14.5)
WBC # BLD: 10.16 K/UL — SIGNIFICANT CHANGE UP (ref 3.8–10.5)
WBC # FLD AUTO: 10.16 K/UL — SIGNIFICANT CHANGE UP (ref 3.8–10.5)

## 2023-02-10 PROCEDURE — 99214 OFFICE O/P EST MOD 30 MIN: CPT

## 2023-02-10 NOTE — HISTORY OF PRESENT ILLNESS
[90: Able to carry normal activity; minor signs or symptoms of disease.] : 90: Able to carry normal activity; minor signs or symptoms of disease.  [de-identified] : 70yoF with a h/o jak2 positive Essential Thrombocytosis diagnosed in 4/11 controlled on hydroxyurea 500 since that time. She has no history of thrombosis in the past.  She is taking aspirin daily.\par \par \par  [de-identified] : high risk b/c age [de-identified] : Patient is on Hydroxyurea 500 mg daily, tolerated well.\par PLTs 369 K \par \par Denies fevers, night sweats or weight loss. \par \par No other changes in medical, surgical or social history since 7/28/22. \par

## 2023-02-10 NOTE — ASSESSMENT
[FreeTextEntry1] : 72 yoF with a h/o ET jak2+ diagnosed 4/2011 on HU 500mg daily tolerating well\par \par -asymptomatic\par -continue HU daily 500mg,\par -repeat CBC in 3 months to ensure counts are stable\par \par Follow up in 6 months\par \par \par

## 2023-02-21 LAB
ALBUMIN SERPL ELPH-MCNC: 4.6 G/DL
ALP BLD-CCNC: 58 U/L
ALT SERPL-CCNC: 15 U/L
ANION GAP SERPL CALC-SCNC: 16 MMOL/L
AST SERPL-CCNC: 16 U/L
BILIRUB SERPL-MCNC: 0.4 MG/DL
BUN SERPL-MCNC: 14 MG/DL
CALCIUM SERPL-MCNC: 10.5 MG/DL
CHLORIDE SERPL-SCNC: 102 MMOL/L
CO2 SERPL-SCNC: 21 MMOL/L
CREAT SERPL-MCNC: 0.66 MG/DL
EGFR: 92 ML/MIN/1.73M2
GLUCOSE SERPL-MCNC: 96 MG/DL
LDH SERPL-CCNC: 178 U/L
POTASSIUM SERPL-SCNC: 4.2 MMOL/L
PROT SERPL-MCNC: 6.7 G/DL
SODIUM SERPL-SCNC: 139 MMOL/L

## 2023-03-20 ENCOUNTER — RX RENEWAL (OUTPATIENT)
Age: 73
End: 2023-03-20

## 2023-03-21 ENCOUNTER — RX RENEWAL (OUTPATIENT)
Age: 73
End: 2023-03-21

## 2023-03-23 ENCOUNTER — LABORATORY RESULT (OUTPATIENT)
Age: 73
End: 2023-03-23

## 2023-03-23 ENCOUNTER — NON-APPOINTMENT (OUTPATIENT)
Age: 73
End: 2023-03-23

## 2023-03-23 ENCOUNTER — APPOINTMENT (OUTPATIENT)
Dept: INTERNAL MEDICINE | Facility: CLINIC | Age: 73
End: 2023-03-23
Payer: MEDICARE

## 2023-03-23 VITALS
HEART RATE: 98 BPM | SYSTOLIC BLOOD PRESSURE: 140 MMHG | HEIGHT: 62.4 IN | WEIGHT: 162 LBS | BODY MASS INDEX: 29.43 KG/M2 | DIASTOLIC BLOOD PRESSURE: 80 MMHG | OXYGEN SATURATION: 97 %

## 2023-03-23 VITALS — SYSTOLIC BLOOD PRESSURE: 130 MMHG | DIASTOLIC BLOOD PRESSURE: 70 MMHG

## 2023-03-23 DIAGNOSIS — H91.90 UNSPECIFIED HEARING LOSS, UNSPECIFIED EAR: ICD-10-CM

## 2023-03-23 DIAGNOSIS — M54.9 DORSALGIA, UNSPECIFIED: ICD-10-CM

## 2023-03-23 DIAGNOSIS — Z87.2 PERSONAL HISTORY OF DISEASES OF THE SKIN AND SUBCUTANEOUS TISSUE: ICD-10-CM

## 2023-03-23 DIAGNOSIS — Z82.0 FAMILY HISTORY OF EPILEPSY AND OTHER DISEASES OF THE NERVOUS SYSTEM: ICD-10-CM

## 2023-03-23 DIAGNOSIS — R73.01 IMPAIRED FASTING GLUCOSE: ICD-10-CM

## 2023-03-23 DIAGNOSIS — T78.3XXA ANGIONEUROTIC EDEMA, INITIAL ENCOUNTER: ICD-10-CM

## 2023-03-23 DIAGNOSIS — R25.2 CRAMP AND SPASM: ICD-10-CM

## 2023-03-23 DIAGNOSIS — S83.422A SPRAIN OF LATERAL COLLATERAL LIGAMENT OF LEFT KNEE, INITIAL ENCOUNTER: ICD-10-CM

## 2023-03-23 DIAGNOSIS — Z23 ENCOUNTER FOR IMMUNIZATION: ICD-10-CM

## 2023-03-23 DIAGNOSIS — Z00.00 ENCOUNTER FOR GENERAL ADULT MEDICAL EXAMINATION W/OUT ABNORMAL FINDINGS: ICD-10-CM

## 2023-03-23 DIAGNOSIS — F41.9 ANXIETY DISORDER, UNSPECIFIED: ICD-10-CM

## 2023-03-23 DIAGNOSIS — R42 DIZZINESS AND GIDDINESS: ICD-10-CM

## 2023-03-23 DIAGNOSIS — T50.905D ADVERSE EFFECT OF UNSPECIFIED DRUGS, MEDICAMENTS AND BIOLOGICAL SUBSTANCES, SUBSEQUENT ENCOUNTER: ICD-10-CM

## 2023-03-23 DIAGNOSIS — F32.A DEPRESSION, UNSPECIFIED: ICD-10-CM

## 2023-03-23 DIAGNOSIS — L50.8 OTHER URTICARIA: ICD-10-CM

## 2023-03-23 DIAGNOSIS — R21 RASH AND OTHER NONSPECIFIC SKIN ERUPTION: ICD-10-CM

## 2023-03-23 PROCEDURE — 93000 ELECTROCARDIOGRAM COMPLETE: CPT | Mod: 59

## 2023-03-23 PROCEDURE — G0444 DEPRESSION SCREEN ANNUAL: CPT

## 2023-03-23 PROCEDURE — G0296 VISIT TO DETERM LDCT ELIG: CPT

## 2023-03-23 PROCEDURE — G0439: CPT

## 2023-03-23 RX ORDER — CETIRIZINE HYDROCHLORIDE 10 MG/1
10 TABLET, COATED ORAL
Qty: 60 | Refills: 3 | Status: DISCONTINUED | COMMUNITY
Start: 2021-12-15 | End: 2023-03-23

## 2023-03-23 RX ORDER — ZOSTER VACCINE RECOMBINANT, ADJUVANTED 50 MCG/0.5
50 KIT INTRAMUSCULAR
Qty: 1 | Refills: 1 | Status: DISCONTINUED | COMMUNITY
Start: 2022-02-24 | End: 2023-03-23

## 2023-03-23 RX ORDER — ZOSTER VACCINE RECOMBINANT, ADJUVANTED 50 MCG/0.5
50 KIT INTRAMUSCULAR
Qty: 1 | Refills: 1 | Status: ACTIVE | COMMUNITY
Start: 2023-03-23 | End: 1900-01-01

## 2023-03-23 NOTE — COUNSELING
[ - Annual Lung Cancer Screening/Share Decision Making Discussion] : Annual Lung Cancer Screening/Share Decision Making Discussion. (I have advised this patient to have a Low Dose CT (LDCT) scan of the lungs and have discussed the following with the patient in a shared decision making discussion:   Benefits of Detection and Early Treatment: There is adequate evidence that annual screening for lung cancer with LDCT in a population of high-risk persons can prevent a substantial number of lung cancer–related deaths. The magnitude of benefit depends on the individual patient's risk for lung cancer, as those who are at highest risk are most likely to benefit. Screening cannot prevent most lung cancer–related deaths, and does not replace smoking cessation. Harms of Detection and Early Intervention and Treatment: The harms associated with LDCT screening include false-negative and false-positive results, incidental findings, over diagnosis, and radiation exposure. False-positive LDCT results occur in a substantial proportion of screened persons; 95% of all positive results do not lead to a diagnosis of cancer. In a high-quality screening program, further imaging can resolve most false-positive results; however, some patients may require invasive procedures. Radiation harms, including cancer resulting from cumulative exposure to radiation, vary depending on the age at the start of screening; the number of scans received; and the person's exposure to other sources of radiation, particularly other medical imaging.) [Behavioral health counseling provided] : Behavioral health counseling provided

## 2023-03-23 NOTE — PHYSICAL EXAM
[No Acute Distress] : no acute distress [Well Nourished] : well nourished [Well Developed] : well developed [Well-Appearing] : well-appearing [Normal Sclera/Conjunctiva] : normal sclera/conjunctiva [PERRL] : pupils equal round and reactive to light [EOMI] : extraocular movements intact [Normal Outer Ear/Nose] : the outer ears and nose were normal in appearance [No JVD] : no jugular venous distention [No Lymphadenopathy] : no lymphadenopathy [Supple] : supple [Thyroid Normal, No Nodules] : the thyroid was normal and there were no nodules present [No Respiratory Distress] : no respiratory distress  [No Accessory Muscle Use] : no accessory muscle use [Clear to Auscultation] : lungs were clear to auscultation bilaterally [Normal Rate] : normal rate  [Regular Rhythm] : with a regular rhythm [Normal S1, S2] : normal S1 and S2 [No Murmur] : no murmur heard [No Carotid Bruits] : no carotid bruits [No Abdominal Bruit] : a ~M bruit was not heard ~T in the abdomen [Pedal Pulses Present] : the pedal pulses are present [No Edema] : there was no peripheral edema [No Palpable Aorta] : no palpable aorta [No Extremity Clubbing/Cyanosis] : no extremity clubbing/cyanosis [Soft] : abdomen soft [Non Tender] : non-tender [Non-distended] : non-distended [No HSM] : no HSM [Normal Bowel Sounds] : normal bowel sounds [Normal Posterior Cervical Nodes] : no posterior cervical lymphadenopathy [Normal Anterior Cervical Nodes] : no anterior cervical lymphadenopathy [No CVA Tenderness] : no CVA  tenderness [No Spinal Tenderness] : no spinal tenderness [No Joint Swelling] : no joint swelling [Grossly Normal Strength/Tone] : grossly normal strength/tone [No Rash] : no rash [Coordination Grossly Intact] : coordination grossly intact [No Focal Deficits] : no focal deficits [Normal Gait] : normal gait [Deep Tendon Reflexes (DTR)] : deep tendon reflexes were 2+ and symmetric [Normal Affect] : the affect was normal [Normal Insight/Judgement] : insight and judgment were intact [Normal Appearance] : normal in appearance [No Masses] : no palpable masses [No Nipple Discharge] : no nipple discharge [No Axillary Lymphadenopathy] : no axillary lymphadenopathy

## 2023-03-24 LAB
ALBUMIN SERPL ELPH-MCNC: 4.8 G/DL
ALP BLD-CCNC: 62 U/L
ALT SERPL-CCNC: 19 U/L
ANION GAP SERPL CALC-SCNC: 14 MMOL/L
AST SERPL-CCNC: 19 U/L
BASOPHILS # BLD AUTO: 0.02 K/UL
BASOPHILS NFR BLD AUTO: 0.2 %
BILIRUB SERPL-MCNC: 0.5 MG/DL
BUN SERPL-MCNC: 13 MG/DL
CALCIUM SERPL-MCNC: 10.8 MG/DL
CHLORIDE SERPL-SCNC: 101 MMOL/L
CHOLEST SERPL-MCNC: 234 MG/DL
CO2 SERPL-SCNC: 24 MMOL/L
CREAT SERPL-MCNC: 0.64 MG/DL
EGFR: 93 ML/MIN/1.73M2
EOSINOPHIL # BLD AUTO: 0.02 K/UL
EOSINOPHIL NFR BLD AUTO: 0.2 %
ESTIMATED AVERAGE GLUCOSE: 108 MG/DL
GLUCOSE SERPL-MCNC: 106 MG/DL
HBA1C MFR BLD HPLC: 5.4 %
HCT VFR BLD CALC: 44.2 %
HDLC SERPL-MCNC: 98 MG/DL
HGB BLD-MCNC: 14.3 G/DL
IMM GRANULOCYTES NFR BLD AUTO: 0.3 %
LDLC SERPL CALC-MCNC: 110 MG/DL
LYMPHOCYTES # BLD AUTO: 2.17 K/UL
LYMPHOCYTES NFR BLD AUTO: 20.9 %
MAN DIFF?: NORMAL
MCHC RBC-ENTMCNC: 32.4 GM/DL
MCHC RBC-ENTMCNC: 36.1 PG
MCV RBC AUTO: 111.6 FL
MONOCYTES # BLD AUTO: 0.98 K/UL
MONOCYTES NFR BLD AUTO: 9.5 %
NEUTROPHILS # BLD AUTO: 7.14 K/UL
NEUTROPHILS NFR BLD AUTO: 68.9 %
NONHDLC SERPL-MCNC: 137 MG/DL
PLATELET # BLD AUTO: 380 K/UL
POTASSIUM SERPL-SCNC: 4.1 MMOL/L
PROT SERPL-MCNC: 7.1 G/DL
RBC # BLD: 3.96 M/UL
RBC # FLD: 11.7 %
SODIUM SERPL-SCNC: 138 MMOL/L
TRIGL SERPL-MCNC: 132 MG/DL
TSH SERPL-ACNC: 3.64 UIU/ML
WBC # FLD AUTO: 10.36 K/UL

## 2023-03-24 NOTE — HISTORY OF PRESENT ILLNESS
[de-identified] : 71 y/o F here for AWV. H/o HTN, HLD, Essential thrombosis, anxiety, hypothyroidism\par Feels very anxious- always had a h/o anxiety\par  had a seizure lasy year, she had to call 911\par  also hurt his back for several months, also injured his shoulder\par Sister with Alzheimer's in [place]- she helps to manage her health\par Had a friend die last week in her sleep\par Fact that she can't walk for long periods also worries her\par Has been taking Xanax for sleep more so than for anxiety\par Has been taking Snore tabs (belladonna)\par Does snore- but Snore tabs done\par Never had an official sleep study\par \par

## 2023-03-24 NOTE — ASSESSMENT
[FreeTextEntry1] : 71 y/o F here for AWV. H/o HTN, HLD, Essential thrombocytosis, anxiety, hypothyroidism, h/o tobacco use\par HTN: Controlled, c/w Losartan\par HLD: on Rosuvastatin, check flp, lft's\par Essential thrombocytosis: sees hematology\par Hypothyroidism: check TSH\par Snoring/sleep disorder: referred to sleep \par EKG: Frequent Premature beats, underlying sinus rhythm- cardiology eval- h/o HTN, HLD\par HCM: Rx for mammo/breast u/s/DEXA provided. She declined Lung CT for screening, advised of r/b\par Anxiety/mild depression: start Sertraline- to take 25 mg once daily for one week then increase to 50 mg daily\par Discussed psychotherapy, she declined for now\par rto 3 mos

## 2023-03-24 NOTE — HEALTH RISK ASSESSMENT
[Good] : ~his/her~  mood as  good [Yes] : Yes [No falls in past year] : Patient reported no falls in the past year [Nearly Every Day (3)] : 3.) Trouble falling asleep, or sleeping too much? Nearly every day [1/2 of Days or More (2)] : 4.) Feeling tired or having little energy? Half the days or more [Several Days (1)] : 6.) Feeling bad about yourself, or that you are a failure, or have let yourself or your family down? Several days [Not at All (0)] : 8.) Moving or speaking so slowly that other people could have noticed, or the opposite, moving or speaking faster than usual? Not at all [Mild] : severity of depression is mild [Somewhat Difficult] : How difficult have these problems made it for you to do your work, take care of things at home, or get along with people? Somewhat difficult [PHQ-9 Positive] : PHQ-9 Positive [Patient reported mammogram was normal] : Patient reported mammogram was normal [Patient reported colonoscopy was normal] : Patient reported colonoscopy was normal [With Family] : lives with family [Retired] : retired [] :  [Fully functional (bathing, dressing, toileting, transferring, walking, feeding)] : Fully functional (bathing, dressing, toileting, transferring, walking, feeding) [Fully functional (using the telephone, shopping, preparing meals, housekeeping, doing laundry, using] : Fully functional and needs no help or supervision to perform IADLs (using the telephone, shopping, preparing meals, housekeeping, doing laundry, using transportation, managing medications and managing finances) [With Patient/Caregiver] : , with patient/caregiver [Former] : Former [20 or more] : 20 or more [< 15 Years] : < 15 Years [Reports changes in hearing] : Reports changes in hearing [de-identified] : 1 shot of vodka each night [de-identified] : walks daily- 20-30 minutes and does PT at home [EPE3Pyivo] : 0 [[id number]] : 8 [Change in mental status noted] : No change in mental status noted [Reports changes in vision] : Reports no changes in vision [MammogramDate] : 09/21 [BoneDensityDate] : 09/21 [ColonoscopyDate] : 06/21 [AdvancecareDate] : 03/23/23 [FreeTextEntry4] : - Nilesh [de-identified] : 30 pack years

## 2023-04-06 ENCOUNTER — APPOINTMENT (OUTPATIENT)
Dept: CARDIOLOGY | Facility: CLINIC | Age: 73
End: 2023-04-06
Payer: MEDICARE

## 2023-04-06 ENCOUNTER — NON-APPOINTMENT (OUTPATIENT)
Age: 73
End: 2023-04-06

## 2023-04-06 VITALS
SYSTOLIC BLOOD PRESSURE: 143 MMHG | HEART RATE: 100 BPM | OXYGEN SATURATION: 98 % | WEIGHT: 161 LBS | BODY MASS INDEX: 29.25 KG/M2 | DIASTOLIC BLOOD PRESSURE: 66 MMHG | HEIGHT: 62.4 IN

## 2023-04-06 PROCEDURE — 93000 ELECTROCARDIOGRAM COMPLETE: CPT

## 2023-04-06 PROCEDURE — 99204 OFFICE O/P NEW MOD 45 MIN: CPT

## 2023-04-07 NOTE — DISCUSSION/SUMMARY
[FreeTextEntry1] : The patient is a 72-year-old female essential thrombocytosis, HTN, HLD with new bundle on ECG. \par #1 CV- pharm stress ordered\par #2 HTN- c/w losartan\par #3 HLD- c/w rosuvastatin\par #4 Heme- c/w aspirin and hydroxyurea\par #5 Anxiety- on sertraline and xanax prn, all questions answered and reassurance provided [EKG obtained to assist in diagnosis and management of assessed problem(s)] : EKG obtained to assist in diagnosis and management of assessed problem(s)

## 2023-04-07 NOTE — HISTORY OF PRESENT ILLNESS
[FreeTextEntry1] : Vy is 72-year-old female HTN, HLD who had change in ECG on CPE. Takes statin qod. Otherwise no symptoms.

## 2023-04-12 ENCOUNTER — APPOINTMENT (OUTPATIENT)
Dept: PULMONOLOGY | Facility: CLINIC | Age: 73
End: 2023-04-12
Payer: MEDICARE

## 2023-04-12 VITALS
HEIGHT: 62 IN | RESPIRATION RATE: 16 BRPM | WEIGHT: 160 LBS | OXYGEN SATURATION: 96 % | HEART RATE: 82 BPM | DIASTOLIC BLOOD PRESSURE: 80 MMHG | SYSTOLIC BLOOD PRESSURE: 120 MMHG | BODY MASS INDEX: 29.44 KG/M2 | TEMPERATURE: 97.4 F

## 2023-04-12 DIAGNOSIS — Z87.891 PERSONAL HISTORY OF NICOTINE DEPENDENCE: ICD-10-CM

## 2023-04-12 DIAGNOSIS — R06.83 SNORING: ICD-10-CM

## 2023-04-12 PROCEDURE — 99203 OFFICE O/P NEW LOW 30 MIN: CPT

## 2023-04-12 RX ORDER — FAMOTIDINE 20 MG/1
20 TABLET, FILM COATED ORAL
Refills: 0 | Status: ACTIVE | COMMUNITY

## 2023-04-12 NOTE — HISTORY OF PRESENT ILLNESS
[Former] : former [>= 20 pack years] : >= 20 pack years [Never] : never [TextBox_4] : Ms. Prince is a 72 year old, former smoking, female. She has past medical history of HTN, HLD, Anxiety, Deviated Septum & Snoring. \par She presents for an initial visit.\par \par Her chief concern is discussing testing for SHERIF. \par \par She states she has been snoring since she was a child. \par She admits to known history of Deviated Septum.\par She initiates sleeps at 11 PM and awakens 5-6 AM to start her day.\par She notes she only ever slept 6 hours, but more recently found she was sleeping 2-4 hours.\par She states sleeping longer since initiating Zoloft therapy. \par She states she started Zoloft 25 mg x 1 week and has since increased to 50 mg QHS. She states she is already feeling benefit in her sleep. \par She notes she was using Snore Stop pill, which she felt helped. \par She awakens with dry mouth. \par She notes she does not awaken feeling refreshed. \par She admits to EDS about 4 times per week. She notes it usually occurs when watching television. \par \par She states upcoming visit with cardiologist, 6/1/23, for a stress test to slightly abnormal EKG. \par \par She denies awakening gasping/choking, awakening with morning headache.\par She denies any pulmonary concerns at this time.\par  [TextBox_11] : 1.5 [TextBox_13] : 15 [YearQuit] : 1990

## 2023-04-12 NOTE — REVIEW OF SYSTEMS
[Fatigue] : fatigue [EDS] : EDS [Depression] : depression [Anxiety] : anxiety [Negative] : Endocrine

## 2023-04-12 NOTE — ASSESSMENT
[FreeTextEntry1] : Ms. Prince is a 72 year old, former smoking, female. She has past medical history of HTN, HLD, Anxiety, Deviated Septum & Snoring. She presents for an initial visit. Her chief concern is discussing testing for SHERIF. \par \par 1. Snoring: \par - I have discussed all the negative health consequences associated with obstructive and central sleep apnea including heart conditions/MI, hypertension, diabetes, chronic inflammation, memory issues, stroke, obesity, decreased libido, sleep related accidents, as well as anxiety and depression. \par - Additional recommendations included: Avoid alcohol and sedatives at bedtime. Proper sleep hygiene such as maintaining a regular sleep routine, avoiding naps if possible, not watching TV or reading in bed,  and maintaining a quiet, comfortable bedroom. Sleepy driving avoidance and risks discussed with patient. \par - Diet, exercise and weight loss suggested.\par - Discussed ordering 2 night HST via [employer]. Patient would like to hold of at this time as she would like to discuss testing with both her  and cardiologist.\par \par Patient to call office if she plans on moving forward with [employer] HST.\par \par Patient to call with further questions and concerns.\par Patient verbalizes understanding of care plan and is agreeable.\par

## 2023-05-01 ENCOUNTER — OUTPATIENT (OUTPATIENT)
Dept: OUTPATIENT SERVICES | Facility: HOSPITAL | Age: 73
LOS: 1 days | Discharge: HOME | End: 2023-05-01

## 2023-05-01 DIAGNOSIS — D47.3 ESSENTIAL (HEMORRHAGIC) THROMBOCYTHEMIA: ICD-10-CM

## 2023-05-10 ENCOUNTER — APPOINTMENT (OUTPATIENT)
Dept: HEMATOLOGY ONCOLOGY | Facility: CLINIC | Age: 73
End: 2023-05-10

## 2023-05-10 ENCOUNTER — RESULT REVIEW (OUTPATIENT)
Age: 73
End: 2023-05-10

## 2023-05-10 LAB
BASOPHILS # BLD AUTO: 0.02 K/UL — SIGNIFICANT CHANGE UP (ref 0–0.2)
BASOPHILS NFR BLD AUTO: 0.2 % — SIGNIFICANT CHANGE UP (ref 0–2)
EOSINOPHIL # BLD AUTO: 0.03 K/UL — SIGNIFICANT CHANGE UP (ref 0–0.5)
EOSINOPHIL NFR BLD AUTO: 0.4 % — SIGNIFICANT CHANGE UP (ref 0–6)
HCT VFR BLD CALC: 38.9 % — SIGNIFICANT CHANGE UP (ref 34.5–45)
HGB BLD-MCNC: 13.6 G/DL — SIGNIFICANT CHANGE UP (ref 11.5–15.5)
IMM GRANULOCYTES NFR BLD AUTO: 0.6 % — SIGNIFICANT CHANGE UP (ref 0–0.9)
LYMPHOCYTES # BLD AUTO: 1.26 K/UL — SIGNIFICANT CHANGE UP (ref 1–3.3)
LYMPHOCYTES # BLD AUTO: 14.9 % — SIGNIFICANT CHANGE UP (ref 13–44)
MCHC RBC-ENTMCNC: 35 G/DL — SIGNIFICANT CHANGE UP (ref 32–36)
MCHC RBC-ENTMCNC: 36.6 PG — HIGH (ref 27–34)
MCV RBC AUTO: 104.6 FL — HIGH (ref 80–100)
MONOCYTES # BLD AUTO: 0.73 K/UL — SIGNIFICANT CHANGE UP (ref 0–0.9)
MONOCYTES NFR BLD AUTO: 8.6 % — SIGNIFICANT CHANGE UP (ref 2–14)
NEUTROPHILS # BLD AUTO: 6.37 K/UL — SIGNIFICANT CHANGE UP (ref 1.8–7.4)
NEUTROPHILS NFR BLD AUTO: 75.3 % — SIGNIFICANT CHANGE UP (ref 43–77)
NRBC # BLD: 0 /100 WBCS — SIGNIFICANT CHANGE UP (ref 0–0)
PLATELET # BLD AUTO: 309 K/UL — SIGNIFICANT CHANGE UP (ref 150–400)
RBC # BLD: 3.72 M/UL — LOW (ref 3.8–5.2)
RBC # FLD: 11.4 % — SIGNIFICANT CHANGE UP (ref 10.3–14.5)
WBC # BLD: 8.46 K/UL — SIGNIFICANT CHANGE UP (ref 3.8–10.5)
WBC # FLD AUTO: 8.46 K/UL — SIGNIFICANT CHANGE UP (ref 3.8–10.5)

## 2023-06-13 ENCOUNTER — RX RENEWAL (OUTPATIENT)
Age: 73
End: 2023-06-13

## 2023-06-21 ENCOUNTER — RESULT REVIEW (OUTPATIENT)
Age: 73
End: 2023-06-21

## 2023-06-21 ENCOUNTER — APPOINTMENT (OUTPATIENT)
Dept: ULTRASOUND IMAGING | Facility: CLINIC | Age: 73
End: 2023-06-21
Payer: MEDICARE

## 2023-06-21 ENCOUNTER — APPOINTMENT (OUTPATIENT)
Dept: MAMMOGRAPHY | Facility: CLINIC | Age: 73
End: 2023-06-21
Payer: MEDICARE

## 2023-06-21 ENCOUNTER — OUTPATIENT (OUTPATIENT)
Dept: OUTPATIENT SERVICES | Facility: HOSPITAL | Age: 73
LOS: 1 days | End: 2023-06-21
Payer: MEDICARE

## 2023-06-21 DIAGNOSIS — R73.01 IMPAIRED FASTING GLUCOSE: ICD-10-CM

## 2023-06-21 PROCEDURE — 77067 SCR MAMMO BI INCL CAD: CPT | Mod: 26

## 2023-06-21 PROCEDURE — 77063 BREAST TOMOSYNTHESIS BI: CPT | Mod: 26

## 2023-06-21 PROCEDURE — 77063 BREAST TOMOSYNTHESIS BI: CPT

## 2023-06-21 PROCEDURE — 77067 SCR MAMMO BI INCL CAD: CPT

## 2023-07-25 ENCOUNTER — APPOINTMENT (OUTPATIENT)
Dept: CV DIAGNOSTICS | Facility: HOSPITAL | Age: 73
End: 2023-07-25

## 2023-07-25 ENCOUNTER — OUTPATIENT (OUTPATIENT)
Dept: OUTPATIENT SERVICES | Facility: HOSPITAL | Age: 73
LOS: 1 days | End: 2023-07-25
Payer: MEDICARE

## 2023-07-25 DIAGNOSIS — R94.31 ABNORMAL ELECTROCARDIOGRAM [ECG] [EKG]: ICD-10-CM

## 2023-07-25 PROCEDURE — 93018 CV STRESS TEST I&R ONLY: CPT | Mod: MH

## 2023-07-25 PROCEDURE — 78452 HT MUSCLE IMAGE SPECT MULT: CPT | Mod: 26,MH

## 2023-07-25 PROCEDURE — A9500: CPT

## 2023-07-25 PROCEDURE — 93017 CV STRESS TEST TRACING ONLY: CPT

## 2023-07-25 PROCEDURE — 78452 HT MUSCLE IMAGE SPECT MULT: CPT | Mod: MH

## 2023-07-25 PROCEDURE — 93016 CV STRESS TEST SUPVJ ONLY: CPT | Mod: MH

## 2023-07-27 ENCOUNTER — NON-APPOINTMENT (OUTPATIENT)
Age: 73
End: 2023-07-27

## 2023-08-04 ENCOUNTER — OUTPATIENT (OUTPATIENT)
Dept: OUTPATIENT SERVICES | Facility: HOSPITAL | Age: 73
LOS: 1 days | Discharge: HOME | End: 2023-08-04

## 2023-08-04 DIAGNOSIS — D47.3 ESSENTIAL (HEMORRHAGIC) THROMBOCYTHEMIA: ICD-10-CM

## 2023-08-04 LAB — BACTERIA BLD CULT: NORMAL

## 2023-08-11 ENCOUNTER — RESULT REVIEW (OUTPATIENT)
Age: 73
End: 2023-08-11

## 2023-08-11 ENCOUNTER — APPOINTMENT (OUTPATIENT)
Dept: HEMATOLOGY ONCOLOGY | Facility: CLINIC | Age: 73
End: 2023-08-11
Payer: MEDICARE

## 2023-08-11 VITALS
WEIGHT: 157.83 LBS | DIASTOLIC BLOOD PRESSURE: 89 MMHG | HEIGHT: 62 IN | SYSTOLIC BLOOD PRESSURE: 147 MMHG | RESPIRATION RATE: 15 BRPM | HEART RATE: 77 BPM | TEMPERATURE: 97.5 F | BODY MASS INDEX: 29.04 KG/M2 | OXYGEN SATURATION: 98 %

## 2023-08-11 LAB
ALBUMIN SERPL ELPH-MCNC: 4.5 G/DL
ALP BLD-CCNC: 60 U/L
ALT SERPL-CCNC: 14 U/L
ANION GAP SERPL CALC-SCNC: 10 MMOL/L
AST SERPL-CCNC: 19 U/L
BASOPHILS # BLD AUTO: 0.02 K/UL — SIGNIFICANT CHANGE UP (ref 0–0.2)
BASOPHILS NFR BLD AUTO: 0.3 % — SIGNIFICANT CHANGE UP (ref 0–2)
BILIRUB SERPL-MCNC: 0.3 MG/DL
BUN SERPL-MCNC: 16 MG/DL
CALCIUM SERPL-MCNC: 10.2 MG/DL
CHLORIDE SERPL-SCNC: 103 MMOL/L
CO2 SERPL-SCNC: 26 MMOL/L
CREAT SERPL-MCNC: 0.62 MG/DL
EGFR: 93 ML/MIN/1.73M2
EOSINOPHIL # BLD AUTO: 0.01 K/UL — SIGNIFICANT CHANGE UP (ref 0–0.5)
EOSINOPHIL NFR BLD AUTO: 0.1 % — SIGNIFICANT CHANGE UP (ref 0–6)
GLUCOSE SERPL-MCNC: 95 MG/DL
HCT VFR BLD CALC: 36.1 % — SIGNIFICANT CHANGE UP (ref 34.5–45)
HGB BLD-MCNC: 12.1 G/DL — SIGNIFICANT CHANGE UP (ref 11.5–15.5)
IMM GRANULOCYTES NFR BLD AUTO: 0.5 % — SIGNIFICANT CHANGE UP (ref 0–0.9)
LDH SERPL-CCNC: 168 U/L
LYMPHOCYTES # BLD AUTO: 1.17 K/UL — SIGNIFICANT CHANGE UP (ref 1–3.3)
LYMPHOCYTES # BLD AUTO: 15 % — SIGNIFICANT CHANGE UP (ref 13–44)
MCHC RBC-ENTMCNC: 33.5 G/DL — SIGNIFICANT CHANGE UP (ref 32–36)
MCHC RBC-ENTMCNC: 35.9 PG — HIGH (ref 27–34)
MCV RBC AUTO: 107.1 FL — HIGH (ref 80–100)
MONOCYTES # BLD AUTO: 0.65 K/UL — SIGNIFICANT CHANGE UP (ref 0–0.9)
MONOCYTES NFR BLD AUTO: 8.3 % — SIGNIFICANT CHANGE UP (ref 2–14)
NEUTROPHILS # BLD AUTO: 5.91 K/UL — SIGNIFICANT CHANGE UP (ref 1.8–7.4)
NEUTROPHILS NFR BLD AUTO: 75.8 % — SIGNIFICANT CHANGE UP (ref 43–77)
NRBC # BLD: 0 /100 WBCS — SIGNIFICANT CHANGE UP (ref 0–0)
PLATELET # BLD AUTO: 318 K/UL — SIGNIFICANT CHANGE UP (ref 150–400)
POTASSIUM SERPL-SCNC: 4.4 MMOL/L
PROT SERPL-MCNC: 6.8 G/DL
RBC # BLD: 3.37 M/UL — LOW (ref 3.8–5.2)
RBC # FLD: 12.9 % — SIGNIFICANT CHANGE UP (ref 10.3–14.5)
SODIUM SERPL-SCNC: 139 MMOL/L
WBC # BLD: 7.8 K/UL — SIGNIFICANT CHANGE UP (ref 3.8–10.5)
WBC # FLD AUTO: 7.8 K/UL — SIGNIFICANT CHANGE UP (ref 3.8–10.5)

## 2023-08-11 PROCEDURE — 99215 OFFICE O/P EST HI 40 MIN: CPT

## 2023-08-11 NOTE — HISTORY OF PRESENT ILLNESS
[Disease:__________________________] : Disease: [unfilled] [0 - No Distress] : Distress Level: 0 [90: Able to carry normal activity; minor signs or symptoms of disease.] : 90: Able to carry normal activity; minor signs or symptoms of disease.  [de-identified] : 70yoF with a h/o jak2 positive Essential Thrombocytosis diagnosed in 4/11 controlled on hydroxyurea 500 since that time. She has no history of thrombosis in the past.  She is taking aspirin daily.\par  \par  \par   [de-identified] : high risk b/c age [de-identified] : Patient is on Hydroxyurea 500 mg daily, tolerated well. PLTs 318 K   Denies fevers, night sweats or weight loss.   No other changes in medical, surgical or social history since 2/10/23.

## 2023-08-11 NOTE — ASSESSMENT
[FreeTextEntry1] : 72 yoF with a h/o ET jak2+ diagnosed 4/2011 on HU 500mg daily tolerating well 1- -asymptomatic -continue HU daily 500mg, ASA 81 mg daily.  -repeat CBC in 3 months to ensure counts are stable  2-Vitamin D deficiency on replacement therapy 3- HTN: On Losartan 4- HLD On Rosuvastatin 5- Depression: On Zolof 50 mg daily.  Greater than 50% of the encounter time was spent on counseling and coordination of care for   ET   and I have spent  40    minutes of face to face time with the patient.  Follow up in 6 months

## 2023-09-11 ENCOUNTER — OUTPATIENT (OUTPATIENT)
Dept: OUTPATIENT SERVICES | Facility: HOSPITAL | Age: 73
LOS: 1 days | End: 2023-09-11
Payer: MEDICARE

## 2023-09-11 ENCOUNTER — APPOINTMENT (OUTPATIENT)
Dept: RADIOLOGY | Facility: CLINIC | Age: 73
End: 2023-09-11
Payer: MEDICARE

## 2023-09-11 DIAGNOSIS — Z00.00 ENCOUNTER FOR GENERAL ADULT MEDICAL EXAMINATION WITHOUT ABNORMAL FINDINGS: ICD-10-CM

## 2023-09-11 PROCEDURE — 77085 DXA BONE DENSITY AXL VRT FX: CPT | Mod: 26

## 2023-09-11 PROCEDURE — 77085 DXA BONE DENSITY AXL VRT FX: CPT

## 2023-09-12 RX ORDER — IBANDRONATE SODIUM 150 MG/1
150 TABLET ORAL
Qty: 3 | Refills: 3 | Status: ACTIVE | COMMUNITY
Start: 2023-09-12 | End: 1900-01-01

## 2023-10-30 ENCOUNTER — APPOINTMENT (OUTPATIENT)
Dept: CARDIOLOGY | Facility: CLINIC | Age: 73
End: 2023-10-30
Payer: MEDICARE

## 2023-10-30 PROCEDURE — 93306 TTE W/DOPPLER COMPLETE: CPT

## 2023-11-02 ENCOUNTER — OUTPATIENT (OUTPATIENT)
Dept: OUTPATIENT SERVICES | Facility: HOSPITAL | Age: 73
LOS: 1 days | Discharge: HOME | End: 2023-11-02

## 2023-11-02 DIAGNOSIS — D47.3 ESSENTIAL (HEMORRHAGIC) THROMBOCYTHEMIA: ICD-10-CM

## 2023-11-10 ENCOUNTER — RESULT REVIEW (OUTPATIENT)
Age: 73
End: 2023-11-10

## 2023-11-10 ENCOUNTER — APPOINTMENT (OUTPATIENT)
Dept: HEMATOLOGY ONCOLOGY | Facility: CLINIC | Age: 73
End: 2023-11-10

## 2023-11-10 LAB
BASOPHILS # BLD AUTO: 0.01 K/UL — SIGNIFICANT CHANGE UP (ref 0–0.2)
BASOPHILS # BLD AUTO: 0.01 K/UL — SIGNIFICANT CHANGE UP (ref 0–0.2)
BASOPHILS NFR BLD AUTO: 0.1 % — SIGNIFICANT CHANGE UP (ref 0–2)
BASOPHILS NFR BLD AUTO: 0.1 % — SIGNIFICANT CHANGE UP (ref 0–2)
EOSINOPHIL # BLD AUTO: 0.01 K/UL — SIGNIFICANT CHANGE UP (ref 0–0.5)
EOSINOPHIL # BLD AUTO: 0.01 K/UL — SIGNIFICANT CHANGE UP (ref 0–0.5)
EOSINOPHIL NFR BLD AUTO: 0.1 % — SIGNIFICANT CHANGE UP (ref 0–6)
EOSINOPHIL NFR BLD AUTO: 0.1 % — SIGNIFICANT CHANGE UP (ref 0–6)
HCT VFR BLD CALC: 37.6 % — SIGNIFICANT CHANGE UP (ref 34.5–45)
HCT VFR BLD CALC: 37.6 % — SIGNIFICANT CHANGE UP (ref 34.5–45)
HGB BLD-MCNC: 13.3 G/DL — SIGNIFICANT CHANGE UP (ref 11.5–15.5)
HGB BLD-MCNC: 13.3 G/DL — SIGNIFICANT CHANGE UP (ref 11.5–15.5)
IMM GRANULOCYTES NFR BLD AUTO: 0.3 % — SIGNIFICANT CHANGE UP (ref 0–0.9)
IMM GRANULOCYTES NFR BLD AUTO: 0.3 % — SIGNIFICANT CHANGE UP (ref 0–0.9)
LYMPHOCYTES # BLD AUTO: 1.29 K/UL — SIGNIFICANT CHANGE UP (ref 1–3.3)
LYMPHOCYTES # BLD AUTO: 1.29 K/UL — SIGNIFICANT CHANGE UP (ref 1–3.3)
LYMPHOCYTES # BLD AUTO: 14.5 % — SIGNIFICANT CHANGE UP (ref 13–44)
LYMPHOCYTES # BLD AUTO: 14.5 % — SIGNIFICANT CHANGE UP (ref 13–44)
MCHC RBC-ENTMCNC: 35.4 G/DL — SIGNIFICANT CHANGE UP (ref 32–36)
MCHC RBC-ENTMCNC: 35.4 G/DL — SIGNIFICANT CHANGE UP (ref 32–36)
MCHC RBC-ENTMCNC: 37 PG — HIGH (ref 27–34)
MCHC RBC-ENTMCNC: 37 PG — HIGH (ref 27–34)
MCV RBC AUTO: 104.7 FL — HIGH (ref 80–100)
MCV RBC AUTO: 104.7 FL — HIGH (ref 80–100)
MONOCYTES # BLD AUTO: 0.67 K/UL — SIGNIFICANT CHANGE UP (ref 0–0.9)
MONOCYTES # BLD AUTO: 0.67 K/UL — SIGNIFICANT CHANGE UP (ref 0–0.9)
MONOCYTES NFR BLD AUTO: 7.5 % — SIGNIFICANT CHANGE UP (ref 2–14)
MONOCYTES NFR BLD AUTO: 7.5 % — SIGNIFICANT CHANGE UP (ref 2–14)
NEUTROPHILS # BLD AUTO: 6.9 K/UL — SIGNIFICANT CHANGE UP (ref 1.8–7.4)
NEUTROPHILS # BLD AUTO: 6.9 K/UL — SIGNIFICANT CHANGE UP (ref 1.8–7.4)
NEUTROPHILS NFR BLD AUTO: 77.5 % — HIGH (ref 43–77)
NEUTROPHILS NFR BLD AUTO: 77.5 % — HIGH (ref 43–77)
NRBC # BLD: 0 /100 WBCS — SIGNIFICANT CHANGE UP (ref 0–0)
NRBC # BLD: 0 /100 WBCS — SIGNIFICANT CHANGE UP (ref 0–0)
PLATELET # BLD AUTO: 328 K/UL — SIGNIFICANT CHANGE UP (ref 150–400)
PLATELET # BLD AUTO: 328 K/UL — SIGNIFICANT CHANGE UP (ref 150–400)
RBC # BLD: 3.59 M/UL — LOW (ref 3.8–5.2)
RBC # BLD: 3.59 M/UL — LOW (ref 3.8–5.2)
RBC # FLD: 12 % — SIGNIFICANT CHANGE UP (ref 10.3–14.5)
RBC # FLD: 12 % — SIGNIFICANT CHANGE UP (ref 10.3–14.5)
WBC # BLD: 8.91 K/UL — SIGNIFICANT CHANGE UP (ref 3.8–10.5)
WBC # BLD: 8.91 K/UL — SIGNIFICANT CHANGE UP (ref 3.8–10.5)
WBC # FLD AUTO: 8.91 K/UL — SIGNIFICANT CHANGE UP (ref 3.8–10.5)
WBC # FLD AUTO: 8.91 K/UL — SIGNIFICANT CHANGE UP (ref 3.8–10.5)

## 2023-11-13 LAB
ALBUMIN SERPL ELPH-MCNC: 4.4 G/DL
ALP BLD-CCNC: 68 U/L
ALT SERPL-CCNC: 14 U/L
ANION GAP SERPL CALC-SCNC: 11 MMOL/L
AST SERPL-CCNC: 16 U/L
BILIRUB SERPL-MCNC: 0.4 MG/DL
BUN SERPL-MCNC: 14 MG/DL
CALCIUM SERPL-MCNC: 9.9 MG/DL
CHLORIDE SERPL-SCNC: 99 MMOL/L
CO2 SERPL-SCNC: 26 MMOL/L
CREAT SERPL-MCNC: 0.57 MG/DL
EGFR: 95 ML/MIN/1.73M2
GLUCOSE SERPL-MCNC: 128 MG/DL
LDH SERPL-CCNC: 168 U/L
POTASSIUM SERPL-SCNC: 4 MMOL/L
PROT SERPL-MCNC: 6.7 G/DL
SODIUM SERPL-SCNC: 137 MMOL/L

## 2023-11-21 ENCOUNTER — NON-APPOINTMENT (OUTPATIENT)
Age: 73
End: 2023-11-21

## 2023-11-21 ENCOUNTER — APPOINTMENT (OUTPATIENT)
Dept: CARDIOLOGY | Facility: CLINIC | Age: 73
End: 2023-11-21
Payer: MEDICARE

## 2023-11-21 VITALS
WEIGHT: 150 LBS | SYSTOLIC BLOOD PRESSURE: 162 MMHG | HEIGHT: 62 IN | DIASTOLIC BLOOD PRESSURE: 82 MMHG | HEART RATE: 79 BPM | BODY MASS INDEX: 27.6 KG/M2 | OXYGEN SATURATION: 98 %

## 2023-11-21 DIAGNOSIS — R94.31 ABNORMAL ELECTROCARDIOGRAM [ECG] [EKG]: ICD-10-CM

## 2023-11-21 PROCEDURE — 93000 ELECTROCARDIOGRAM COMPLETE: CPT

## 2023-11-21 PROCEDURE — 99213 OFFICE O/P EST LOW 20 MIN: CPT

## 2024-01-17 ENCOUNTER — APPOINTMENT (OUTPATIENT)
Dept: ENDOCRINOLOGY | Facility: CLINIC | Age: 74
End: 2024-01-17
Payer: MEDICARE

## 2024-01-17 VITALS
HEIGHT: 62 IN | HEART RATE: 83 BPM | WEIGHT: 157 LBS | SYSTOLIC BLOOD PRESSURE: 140 MMHG | OXYGEN SATURATION: 97 % | BODY MASS INDEX: 28.89 KG/M2 | DIASTOLIC BLOOD PRESSURE: 80 MMHG

## 2024-01-17 DIAGNOSIS — E55.9 VITAMIN D DEFICIENCY, UNSPECIFIED: ICD-10-CM

## 2024-01-17 DIAGNOSIS — M81.0 AGE-RELATED OSTEOPOROSIS W/OUT CURRENT PATHOLOGICAL FRACTURE: ICD-10-CM

## 2024-01-17 PROCEDURE — G2211 COMPLEX E/M VISIT ADD ON: CPT

## 2024-01-17 PROCEDURE — 99204 OFFICE O/P NEW MOD 45 MIN: CPT

## 2024-01-17 RX ADMIN — ZOLEDRONIC ACID 5 MG/100ML: 5 INJECTION, SOLUTION INTRAVENOUS at 00:00

## 2024-01-18 LAB
25(OH)D3 SERPL-MCNC: 50.1 NG/ML
ALBUMIN SERPL ELPH-MCNC: 4.7 G/DL
ALP BLD-CCNC: 65 U/L
ALT SERPL-CCNC: 13 U/L
ANION GAP SERPL CALC-SCNC: 12 MMOL/L
AST SERPL-CCNC: 16 U/L
BILIRUB SERPL-MCNC: 0.4 MG/DL
BUN SERPL-MCNC: 13 MG/DL
CALCIUM SERPL-MCNC: 10.6 MG/DL
CALCIUM SERPL-MCNC: 10.6 MG/DL
CHLORIDE SERPL-SCNC: 99 MMOL/L
CO2 SERPL-SCNC: 25 MMOL/L
CREAT SERPL-MCNC: 0.59 MG/DL
EGFR: 94 ML/MIN/1.73M2
GLUCOSE SERPL-MCNC: 98 MG/DL
PARATHYROID HORMONE INTACT: 38 PG/ML
PHOSPHATE SERPL-MCNC: 3.3 MG/DL
POTASSIUM SERPL-SCNC: 4.4 MMOL/L
PROT SERPL-MCNC: 7.3 G/DL
SODIUM SERPL-SCNC: 137 MMOL/L
TSH SERPL-ACNC: 3.38 UIU/ML

## 2024-01-18 NOTE — REASON FOR VISIT
[Consultation] : a consultation visit [Osteoporosis] : osteoporosis [FreeTextEntry2] : Dr. Jackie Valderrama

## 2024-01-18 NOTE — CONSULT LETTER
[Dear  ___] : Dear  [unfilled], [Consult Letter:] : I had the pleasure of evaluating your patient, [unfilled]. [Consult Closing:] : Thank you very much for allowing me to participate in the care of this patient.  If you have any questions, please do not hesitate to contact me. [FreeTextEntry2] : Dr. Jackie Valderrama

## 2024-01-18 NOTE — HISTORY OF PRESENT ILLNESS
[Calcium (dietary)] : dietary Calcium [Vitamin D (oral)] : Vitamin D orally [FreeTextEntry1] : CHIEF COMPLAINT: Osteoporosis   HISTORY OF PRESENT ILLNESS:  Patient is post-menopausal since age 56-56. No HRT.  Denies disordered eating or amenorrhea during reproductive years. UTD mammography. Denies personal or family history of breast cancer, history of radiation therapy.  Patient states she has had many screening DXAs and reportedly told of normal BMD in the past. I do not have the bone density data / images available from that time.  In Feb 2021, pt states she kneeled over during a snowstorm and fell. She broke her L hip and femur. S/p emergency surgery with pins at [facility], no post op complications. No other fx.  Bone Mineral Density: 9/11/2023  Indication: Vs 2021 Spine: -1.2, osteopenia, no significant change Total hip: -2.5, osteoporosis, no significant change Femoral neck: -2.0, osteopenia, no significant change Proximal radius: not measured   Patient does PT exercises and walks daily.  Denies frequent falls. Uses assistive device - cane if the weather is bad or she is out on a walk.  Denies family history of hip fracture or osteoporosis.  Diet regular. Has milk and cheese. Supplements with 500 mg Ca daily and 1000 IU of Vit D3. Denies history of kidney stones, excessive alcohol intake, excessive soda intake, celiac disease, malabsorption, nutritional deficiencies, GIB, stomach ulcers.  Former smoker, since teenager, quit 33 years ago.   PMHx:  HTN - Losartan 25  HLD - Rosuvastatin 5 mg QOD Cardiac w/u 2023 negative, started on ASA 81 mg  Denies Paget's Dz, hyperparathyroidism, diabetes, CVD, blood clots, and chronic steroid use.  PSHx:  L TKR @ [facility] on Apr 25, 2018. Denies thoracic or lumbar vertebroplasty.   UTD with routine care. Sees DDS every 6 months. No major dental work planned. H/o 10 implants, currently has 1 missing tooth, not planning implant.  Labs from Nov 2023 reviewed. [FreeTextEntry4] : Ca 500 mg QD

## 2024-01-18 NOTE — PHYSICAL EXAM
[Alert] : alert [Well Nourished] : well nourished [No Acute Distress] : no acute distress [Well Developed] : well developed [Normal Sclera/Conjunctiva] : normal sclera/conjunctiva [EOMI] : extra ocular movement intact [No Proptosis] : no proptosis [Normal Lips/Gums] : the lips and gums were normal [Supple] : the neck was supple [Thyroid Not Enlarged] : the thyroid was not enlarged [No Thyroid Nodules] : no palpable thyroid nodules [No Respiratory Distress] : no respiratory distress [No Accessory Muscle Use] : no accessory muscle use [Clear to Auscultation] : lungs were clear to auscultation bilaterally [Normal S1, S2] : normal S1 and S2 [Normal Rate] : heart rate was normal [Regular Rhythm] : with a regular rhythm [No Edema] : no peripheral edema [Pedal Pulses Normal] : the pedal pulses are present [Not Tender] : non-tender [Not Distended] : not distended [Soft] : abdomen soft [Normal Anterior Cervical Nodes] : no anterior cervical lymphadenopathy [No Spinal Tenderness] : no spinal tenderness [Spine Straight] : spine straight [No Stigmata of Cushings Syndrome] : no stigmata of Cushings Syndrome [Normal Gait] : normal gait [Normal Strength/Tone] : muscle strength and tone were normal [No Tremors] : no tremors [Oriented x3] : oriented to person, place, and time [Normal Affect] : the affect was normal [Normal Mood] : the mood was normal [Acanthosis Nigricans] : no acanthosis nigricans [de-identified] : missing left upper tooth [de-identified] : 3 finger breadths ribs to pelvis

## 2024-01-18 NOTE — ASSESSMENT
[FreeTextEntry1] : The patient is referred for initial consultation for osteoporosis.  Patient is post-menopausal since age [age]. No HRT. UTD mammography. Denies personal or family history of breast cancer, history of radiation therapy.  Patient states she has had many screening DXAs and reportedly told of normal BMD in the past. I do not have the bone density data / images available from that time. In Feb 2021, pt states she kneeled over during a snowstorm and fell. She broke her L hip and femur. S/p emergency surgery with pins at [facility], no post op complications. No other fx.  Bone Mineral Density: 9/11/2023  Indication: Vs 2021 Spine: -1.2, osteopenia, no significant change Total hip: -2.5, osteoporosis, no significant change Femoral neck: -2.0, osteopenia, no significant change Proximal radius: not measured   Denies family history of hip fracture or osteoporosis.  BMD results were discussed with the patient. Given the patient's history and BMD, the patient is at an increased risk of future fracture. Options of medical therapy were discussed in detail including risks and benefits.   I discussed Rx with bisphosphonate therapy including IV Reclast. Risks and benefits of bisphosphonate therapy were discussed including minor aches and pains, heartburn, gastroesophageal irritation (excluding IV Reclast), osteonecrosis of the jaw (ONJ), atypical femur fractures, and acute phase reaction (w/ IV Reclast only). The patient would benefit from bisphosphonate therapy with the expectation of a drug holiday within 3-5 years.    All questions were answered. Rx information handout provided. The patient understands and elects to start IV Reclast. Consent signed in office. Rheumatology referral given.   I discussed that weight bearing exercises, cardiovascular activities, and diet are beneficial to overall health, but are not adequate for bone density increase or alternative to osteoporosis medication.  I recommend supplementing with no more than 500 mg of Ca and 1000 IU of Vit D3.    Draw labs today including CMP, PTH, Phos, Vit D, TSH and CTx, a baseline marker of bone turnover to serve as a baseline for future comparison.  Denies frequent falls, but states gait is unsteady and uses cane if the weather is bad or she is out on a walk. Referral to PT for balance and gait training ordered.    Follow up in and repeat BMD in 1 year after starting therapy.

## 2024-01-18 NOTE — PROCEDURE
[FreeTextEntry1] : Bone Mineral Density: 9/11/2023  Indication: Vs 2021 Spine: -1.2, osteopenia, no significant change Total hip: -2.5, osteoporosis, no significant change Femoral neck: -2.0, osteopenia, no significant change Proximal radius: not measured

## 2024-01-22 LAB — COLLAGEN CTX SERPL-MCNC: 199 PG/ML

## 2024-02-06 ENCOUNTER — OUTPATIENT (OUTPATIENT)
Dept: OUTPATIENT SERVICES | Facility: HOSPITAL | Age: 74
LOS: 1 days | Discharge: HOME | End: 2024-02-06

## 2024-02-06 DIAGNOSIS — D47.3 ESSENTIAL (HEMORRHAGIC) THROMBOCYTHEMIA: ICD-10-CM

## 2024-02-07 ENCOUNTER — RX RENEWAL (OUTPATIENT)
Age: 74
End: 2024-02-07

## 2024-02-16 ENCOUNTER — APPOINTMENT (OUTPATIENT)
Dept: HEMATOLOGY ONCOLOGY | Facility: CLINIC | Age: 74
End: 2024-02-16
Payer: MEDICARE

## 2024-02-16 ENCOUNTER — RESULT REVIEW (OUTPATIENT)
Age: 74
End: 2024-02-16

## 2024-02-16 VITALS
RESPIRATION RATE: 16 BRPM | TEMPERATURE: 98.4 F | HEIGHT: 62.05 IN | SYSTOLIC BLOOD PRESSURE: 132 MMHG | DIASTOLIC BLOOD PRESSURE: 83 MMHG | BODY MASS INDEX: 28.34 KG/M2 | WEIGHT: 156 LBS | HEART RATE: 80 BPM | OXYGEN SATURATION: 97 %

## 2024-02-16 LAB
ALBUMIN SERPL ELPH-MCNC: 4.6 G/DL
ALP BLD-CCNC: 65 U/L
ALT SERPL-CCNC: 14 U/L
ANION GAP SERPL CALC-SCNC: 9 MMOL/L
AST SERPL-CCNC: 16 U/L
BASOPHILS # BLD AUTO: 0.01 K/UL — SIGNIFICANT CHANGE UP (ref 0–0.2)
BASOPHILS NFR BLD AUTO: 0.1 % — SIGNIFICANT CHANGE UP (ref 0–2)
BILIRUB SERPL-MCNC: 0.5 MG/DL
BUN SERPL-MCNC: 14 MG/DL
CALCIUM SERPL-MCNC: 10.5 MG/DL
CHLORIDE SERPL-SCNC: 101 MMOL/L
CO2 SERPL-SCNC: 28 MMOL/L
CREAT SERPL-MCNC: 0.67 MG/DL
EGFR: 91 ML/MIN/1.73M2
EOSINOPHIL # BLD AUTO: 0.02 K/UL — SIGNIFICANT CHANGE UP (ref 0–0.5)
EOSINOPHIL NFR BLD AUTO: 0.2 % — SIGNIFICANT CHANGE UP (ref 0–6)
GLUCOSE SERPL-MCNC: 100 MG/DL
HCT VFR BLD CALC: 41.1 % — SIGNIFICANT CHANGE UP (ref 34.5–45)
HGB BLD-MCNC: 14.2 G/DL — SIGNIFICANT CHANGE UP (ref 11.5–15.5)
IMM GRANULOCYTES NFR BLD AUTO: 0.4 % — SIGNIFICANT CHANGE UP (ref 0–0.9)
LDH SERPL-CCNC: 156 U/L
LYMPHOCYTES # BLD AUTO: 1.03 K/UL — SIGNIFICANT CHANGE UP (ref 1–3.3)
LYMPHOCYTES # BLD AUTO: 12.1 % — LOW (ref 13–44)
MCHC RBC-ENTMCNC: 34.5 G/DL — SIGNIFICANT CHANGE UP (ref 32–36)
MCHC RBC-ENTMCNC: 36.7 PG — HIGH (ref 27–34)
MCV RBC AUTO: 106.2 FL — HIGH (ref 80–100)
MONOCYTES # BLD AUTO: 0.69 K/UL — SIGNIFICANT CHANGE UP (ref 0–0.9)
MONOCYTES NFR BLD AUTO: 8.1 % — SIGNIFICANT CHANGE UP (ref 2–14)
NEUTROPHILS # BLD AUTO: 6.74 K/UL — SIGNIFICANT CHANGE UP (ref 1.8–7.4)
NEUTROPHILS NFR BLD AUTO: 79.1 % — HIGH (ref 43–77)
NRBC # BLD: 0 /100 WBCS — SIGNIFICANT CHANGE UP (ref 0–0)
PLATELET # BLD AUTO: 281 K/UL — SIGNIFICANT CHANGE UP (ref 150–400)
POTASSIUM SERPL-SCNC: 4.8 MMOL/L
PROT SERPL-MCNC: 7.1 G/DL
RBC # BLD: 3.87 M/UL — SIGNIFICANT CHANGE UP (ref 3.8–5.2)
RBC # FLD: 11.4 % — SIGNIFICANT CHANGE UP (ref 10.3–14.5)
SODIUM SERPL-SCNC: 139 MMOL/L
WBC # BLD: 8.52 K/UL — SIGNIFICANT CHANGE UP (ref 3.8–10.5)
WBC # FLD AUTO: 8.52 K/UL — SIGNIFICANT CHANGE UP (ref 3.8–10.5)

## 2024-02-16 PROCEDURE — 99215 OFFICE O/P EST HI 40 MIN: CPT

## 2024-02-16 NOTE — ASSESSMENT
[FreeTextEntry1] : 73 yoF with a h/o ET jak2+ diagnosed 4/2011 on HU 500mg daily tolerating well 1- -asymptomatic -continue HU daily 500mg, ASA 81 mg daily.  -repeat CBC in 3 months to ensure counts are stable  2-Vitamin D deficiency on replacement therapy 3- HTN: On Losartan 4- HLD On Rosuvastatin 5- Depression: On Zolof 50 mg daily.  Greater than 50% of the encounter time was spent on counseling and coordination of care for   ET   and I have spent  40    minutes of face to face time with the patient.  Follow up in 6 months

## 2024-02-16 NOTE — HISTORY OF PRESENT ILLNESS
[Disease:__________________________] : Disease: [unfilled] [0 - No Distress] : Distress Level: 0 [90: Able to carry normal activity; minor signs or symptoms of disease.] : 90: Able to carry normal activity; minor signs or symptoms of disease.  [de-identified] : 70yoF with a h/o jak2 positive Essential Thrombocytosis diagnosed in 4/11 controlled on hydroxyurea 500 since that time. She has no history of thrombosis in the past.  She is taking aspirin daily.\par  \par  \par   [de-identified] : high risk b/c age [de-identified] : Patient is on Hydroxyurea 500 mg daily, tolerated well. PLTs 281 K   Denies fevers, night sweats or weight loss.   No other changes in medical, surgical or social history since 8/11/23.

## 2024-02-26 ENCOUNTER — APPOINTMENT (OUTPATIENT)
Dept: RHEUMATOLOGY | Facility: CLINIC | Age: 74
End: 2024-02-26
Payer: MEDICARE

## 2024-02-26 VITALS
DIASTOLIC BLOOD PRESSURE: 71 MMHG | OXYGEN SATURATION: 95 % | SYSTOLIC BLOOD PRESSURE: 118 MMHG | TEMPERATURE: 97.2 F | RESPIRATION RATE: 16 BRPM | HEART RATE: 78 BPM

## 2024-02-26 VITALS
RESPIRATION RATE: 16 BRPM | DIASTOLIC BLOOD PRESSURE: 66 MMHG | SYSTOLIC BLOOD PRESSURE: 118 MMHG | OXYGEN SATURATION: 98 % | HEART RATE: 74 BPM | TEMPERATURE: 97.5 F

## 2024-02-26 PROCEDURE — 96365 THER/PROPH/DIAG IV INF INIT: CPT

## 2024-02-26 RX ORDER — ZOLEDRONIC ACID 5 MG/100ML
5 INJECTION INTRAVENOUS
Qty: 0 | Refills: 0 | Status: COMPLETED | OUTPATIENT
Start: 2024-01-17

## 2024-02-26 NOTE — HISTORY OF PRESENT ILLNESS
[Denies] : Denies [No] : No [N/A] : N/A [Yes] : Yes [Informed consent documented in EHR.] : Informed consent documented in EHR. [Left upper extremity] : Left upper extremity [24g] : 24g [Start Time: ___] : Medication Start Time: [unfilled] [End Time: ___] : Medication End Time: [unfilled] [Medication Name: ___] : Medication Name: [unfilled] [Total Amount Administered: ___] : Total Amount Administered: [unfilled] [IV discontinued. Intact. No signs or symptoms of IV complications noted. Time: ___] : IV discontinued. Intact. No signs or symptoms of IV complications noted. Time: [unfilled] [Patient  instructed to seek medical attention with signs and symptoms of adverse effects] : Patient  instructed to seek medical attention with signs and symptoms of adverse effects [Medications administered as ordered and tolerated well.] : Medications administered as ordered and tolerated well. [Patient left unit in no acute distress] : Patient left unit in no acute distress [de-identified] : 1st infusion  [de-identified] : Patient is here for scheduled Zoledronic Acid infusion. Patient state she is feeling well. She denies any recent infection/ abx use. Patient consented by MD regarding infusion. Discussed medication with patient. Also discussed s/s of side effects or reaction to medication. Patient verbalized understanding.  In all, patient tolerated infusion well, discharged to home in NAD.

## 2024-03-21 ENCOUNTER — RX RENEWAL (OUTPATIENT)
Age: 74
End: 2024-03-21

## 2024-03-21 RX ORDER — SERTRALINE HYDROCHLORIDE 50 MG/1
50 TABLET, FILM COATED ORAL
Qty: 90 | Refills: 3 | Status: ACTIVE | COMMUNITY
Start: 2023-03-23 | End: 1900-01-01

## 2024-03-26 ENCOUNTER — RX RENEWAL (OUTPATIENT)
Age: 74
End: 2024-03-26

## 2024-03-26 RX ORDER — HYDROXYUREA 500 MG/1
500 CAPSULE ORAL
Qty: 60 | Refills: 11 | Status: ACTIVE | COMMUNITY
Start: 2022-11-09 | End: 1900-01-01

## 2024-04-23 RX ORDER — HYDROXYUREA 500 MG/1
500 CAPSULE ORAL DAILY
Qty: 30 | Refills: 10 | Status: ACTIVE | COMMUNITY
Start: 2024-03-26 | End: 1900-01-01

## 2024-05-09 ENCOUNTER — OUTPATIENT (OUTPATIENT)
Dept: OUTPATIENT SERVICES | Facility: HOSPITAL | Age: 74
LOS: 1 days | Discharge: HOME | End: 2024-05-09

## 2024-05-09 DIAGNOSIS — D47.3 ESSENTIAL (HEMORRHAGIC) THROMBOCYTHEMIA: ICD-10-CM

## 2024-05-10 DIAGNOSIS — D47.3 ESSENTIAL (HEMORRHAGIC) THROMBOCYTHEMIA: ICD-10-CM

## 2024-05-17 ENCOUNTER — APPOINTMENT (OUTPATIENT)
Dept: HEMATOLOGY ONCOLOGY | Facility: CLINIC | Age: 74
End: 2024-05-17

## 2024-05-21 ENCOUNTER — NON-APPOINTMENT (OUTPATIENT)
Age: 74
End: 2024-05-21

## 2024-05-21 ENCOUNTER — APPOINTMENT (OUTPATIENT)
Dept: CARDIOLOGY | Facility: CLINIC | Age: 74
End: 2024-05-21
Payer: MEDICARE

## 2024-05-21 VITALS
OXYGEN SATURATION: 96 % | DIASTOLIC BLOOD PRESSURE: 75 MMHG | WEIGHT: 153 LBS | BODY MASS INDEX: 27.94 KG/M2 | HEART RATE: 83 BPM | SYSTOLIC BLOOD PRESSURE: 119 MMHG

## 2024-05-21 DIAGNOSIS — E78.5 HYPERLIPIDEMIA, UNSPECIFIED: ICD-10-CM

## 2024-05-21 DIAGNOSIS — E03.9 HYPOTHYROIDISM, UNSPECIFIED: ICD-10-CM

## 2024-05-21 DIAGNOSIS — I10 ESSENTIAL (PRIMARY) HYPERTENSION: ICD-10-CM

## 2024-05-21 PROCEDURE — 93000 ELECTROCARDIOGRAM COMPLETE: CPT

## 2024-05-21 PROCEDURE — G2211 COMPLEX E/M VISIT ADD ON: CPT

## 2024-05-21 PROCEDURE — 99214 OFFICE O/P EST MOD 30 MIN: CPT

## 2024-05-21 NOTE — HISTORY OF PRESENT ILLNESS
[FreeTextEntry1] : Vy feels awful when wakes up secondary to join pain. Calcium injection for right hip. Left leg ok and left hip not ok. Now suffering so much so going to go back to ortho for further evaluation.

## 2024-05-21 NOTE — DISCUSSION/SUMMARY
[FreeTextEntry1] : The patient is a 73-year-old female essential thrombocytosis, HTN, HLD, PVCs and severe arthritis limiting mobility. #1 CV- PVCs, hold on further workup at present, emotional support provided #2 HTN- c/w losartan 25mg #3 HLD- c/w rosuvastatin #4 Heme- c/w aspirin and hydroxyurea #5 Anxiety- on sertraline and xanax prn #6 Ortho- awaiting evaluation [EKG obtained to assist in diagnosis and management of assessed problem(s)] : EKG obtained to assist in diagnosis and management of assessed problem(s)

## 2024-05-23 ENCOUNTER — RESULT REVIEW (OUTPATIENT)
Age: 74
End: 2024-05-23

## 2024-05-23 ENCOUNTER — APPOINTMENT (OUTPATIENT)
Dept: HEMATOLOGY ONCOLOGY | Facility: CLINIC | Age: 74
End: 2024-05-23

## 2024-05-23 LAB
ALBUMIN SERPL ELPH-MCNC: 4.5 G/DL
ALP BLD-CCNC: 55 U/L
ALT SERPL-CCNC: 17 U/L
ANION GAP SERPL CALC-SCNC: 11 MMOL/L
AST SERPL-CCNC: 19 U/L
BASOPHILS # BLD AUTO: 0.02 K/UL — SIGNIFICANT CHANGE UP (ref 0–0.2)
BASOPHILS NFR BLD AUTO: 0.2 % — SIGNIFICANT CHANGE UP (ref 0–2)
BILIRUB SERPL-MCNC: 0.6 MG/DL
BUN SERPL-MCNC: 11 MG/DL
CALCIUM SERPL-MCNC: 10.2 MG/DL
CHLORIDE SERPL-SCNC: 102 MMOL/L
CO2 SERPL-SCNC: 25 MMOL/L
CREAT SERPL-MCNC: 0.55 MG/DL
EGFR: 96 ML/MIN/1.73M2
EOSINOPHIL # BLD AUTO: 0.01 K/UL — SIGNIFICANT CHANGE UP (ref 0–0.5)
EOSINOPHIL NFR BLD AUTO: 0.1 % — SIGNIFICANT CHANGE UP (ref 0–6)
GLUCOSE SERPL-MCNC: 109 MG/DL
HCT VFR BLD CALC: 40.2 % — SIGNIFICANT CHANGE UP (ref 34.5–45)
HGB BLD-MCNC: 14.3 G/DL — SIGNIFICANT CHANGE UP (ref 11.5–15.5)
IMM GRANULOCYTES NFR BLD AUTO: 0.5 % — SIGNIFICANT CHANGE UP (ref 0–0.9)
LDH SERPL-CCNC: 189 U/L
LYMPHOCYTES # BLD AUTO: 1.28 K/UL — SIGNIFICANT CHANGE UP (ref 1–3.3)
LYMPHOCYTES # BLD AUTO: 14.5 % — SIGNIFICANT CHANGE UP (ref 13–44)
MCHC RBC-ENTMCNC: 35.6 G/DL — SIGNIFICANT CHANGE UP (ref 32–36)
MCHC RBC-ENTMCNC: 36.4 PG — HIGH (ref 27–34)
MCV RBC AUTO: 102.3 FL — HIGH (ref 80–100)
MONOCYTES # BLD AUTO: 0.65 K/UL — SIGNIFICANT CHANGE UP (ref 0–0.9)
MONOCYTES NFR BLD AUTO: 7.4 % — SIGNIFICANT CHANGE UP (ref 2–14)
NEUTROPHILS # BLD AUTO: 6.8 K/UL — SIGNIFICANT CHANGE UP (ref 1.8–7.4)
NEUTROPHILS NFR BLD AUTO: 77.3 % — HIGH (ref 43–77)
NRBC # BLD: 0 /100 WBCS — SIGNIFICANT CHANGE UP (ref 0–0)
PLATELET # BLD AUTO: 319 K/UL — SIGNIFICANT CHANGE UP (ref 150–400)
POTASSIUM SERPL-SCNC: 4.3 MMOL/L
PROT SERPL-MCNC: 6.9 G/DL
RBC # BLD: 3.93 M/UL — SIGNIFICANT CHANGE UP (ref 3.8–5.2)
RBC # FLD: 11.8 % — SIGNIFICANT CHANGE UP (ref 10.3–14.5)
SODIUM SERPL-SCNC: 138 MMOL/L
WBC # BLD: 8.8 K/UL — SIGNIFICANT CHANGE UP (ref 3.8–10.5)
WBC # FLD AUTO: 8.8 K/UL — SIGNIFICANT CHANGE UP (ref 3.8–10.5)

## 2024-06-20 ENCOUNTER — RX RENEWAL (OUTPATIENT)
Age: 74
End: 2024-06-20

## 2024-06-20 RX ORDER — LOSARTAN POTASSIUM 25 MG/1
25 TABLET, FILM COATED ORAL
Qty: 90 | Refills: 3 | Status: ACTIVE | COMMUNITY
Start: 2022-02-24 | End: 1900-01-01

## 2024-06-24 ENCOUNTER — RESULT REVIEW (OUTPATIENT)
Age: 74
End: 2024-06-24

## 2024-06-24 ENCOUNTER — APPOINTMENT (OUTPATIENT)
Dept: MAMMOGRAPHY | Facility: CLINIC | Age: 74
End: 2024-06-24
Payer: MEDICARE

## 2024-06-24 ENCOUNTER — OUTPATIENT (OUTPATIENT)
Dept: OUTPATIENT SERVICES | Facility: HOSPITAL | Age: 74
LOS: 1 days | End: 2024-06-24
Payer: MEDICARE

## 2024-06-24 ENCOUNTER — RX RENEWAL (OUTPATIENT)
Age: 74
End: 2024-06-24

## 2024-06-24 DIAGNOSIS — Z12.31 ENCOUNTER FOR SCREENING MAMMOGRAM FOR MALIGNANT NEOPLASM OF BREAST: ICD-10-CM

## 2024-06-24 PROCEDURE — 77067 SCR MAMMO BI INCL CAD: CPT | Mod: 26

## 2024-06-24 PROCEDURE — 77067 SCR MAMMO BI INCL CAD: CPT

## 2024-06-24 PROCEDURE — 77063 BREAST TOMOSYNTHESIS BI: CPT | Mod: 26

## 2024-06-24 PROCEDURE — 77063 BREAST TOMOSYNTHESIS BI: CPT

## 2024-06-24 RX ORDER — ROSUVASTATIN CALCIUM 5 MG/1
5 TABLET, FILM COATED ORAL
Qty: 90 | Refills: 0 | Status: ACTIVE | COMMUNITY
Start: 2017-01-03 | End: 1900-01-01

## 2024-07-02 ENCOUNTER — RESULT REVIEW (OUTPATIENT)
Age: 74
End: 2024-07-02

## 2024-07-02 ENCOUNTER — OUTPATIENT (OUTPATIENT)
Dept: OUTPATIENT SERVICES | Facility: HOSPITAL | Age: 74
LOS: 1 days | End: 2024-07-02
Payer: MEDICARE

## 2024-07-02 ENCOUNTER — APPOINTMENT (OUTPATIENT)
Dept: ULTRASOUND IMAGING | Facility: CLINIC | Age: 74
End: 2024-07-02
Payer: MEDICARE

## 2024-07-02 DIAGNOSIS — Z00.00 ENCOUNTER FOR GENERAL ADULT MEDICAL EXAMINATION WITHOUT ABNORMAL FINDINGS: ICD-10-CM

## 2024-07-02 PROCEDURE — 76642 ULTRASOUND BREAST LIMITED: CPT

## 2024-07-02 PROCEDURE — 76642 ULTRASOUND BREAST LIMITED: CPT | Mod: 26,LT

## 2024-07-08 DIAGNOSIS — R92.8 OTHER ABNORMAL AND INCONCLUSIVE FINDINGS ON DIAGNOSTIC IMAGING OF BREAST: ICD-10-CM

## 2024-07-09 ENCOUNTER — OUTPATIENT (OUTPATIENT)
Dept: OUTPATIENT SERVICES | Facility: HOSPITAL | Age: 74
LOS: 1 days | End: 2024-07-09
Payer: MEDICARE

## 2024-07-09 ENCOUNTER — APPOINTMENT (OUTPATIENT)
Dept: INTERNAL MEDICINE | Facility: CLINIC | Age: 74
End: 2024-07-09

## 2024-07-09 VITALS
OXYGEN SATURATION: 98 % | WEIGHT: 151 LBS | HEIGHT: 62.5 IN | HEART RATE: 86 BPM | SYSTOLIC BLOOD PRESSURE: 100 MMHG | BODY MASS INDEX: 27.09 KG/M2 | DIASTOLIC BLOOD PRESSURE: 60 MMHG

## 2024-07-09 DIAGNOSIS — E03.9 HYPOTHYROIDISM, UNSPECIFIED: ICD-10-CM

## 2024-07-09 DIAGNOSIS — I10 ESSENTIAL (PRIMARY) HYPERTENSION: ICD-10-CM

## 2024-07-09 DIAGNOSIS — D47.3 ESSENTIAL (HEMORRHAGIC) THROMBOCYTHEMIA: ICD-10-CM

## 2024-07-09 DIAGNOSIS — Z00.00 ENCOUNTER FOR GENERAL ADULT MEDICAL EXAMINATION W/OUT ABNORMAL FINDINGS: ICD-10-CM

## 2024-07-09 DIAGNOSIS — F41.9 ANXIETY DISORDER, UNSPECIFIED: ICD-10-CM

## 2024-07-09 DIAGNOSIS — E78.5 HYPERLIPIDEMIA, UNSPECIFIED: ICD-10-CM

## 2024-07-09 DIAGNOSIS — Z00.00 ENCOUNTER FOR GENERAL ADULT MEDICAL EXAMINATION WITHOUT ABNORMAL FINDINGS: ICD-10-CM

## 2024-07-09 PROCEDURE — G0439: CPT

## 2024-07-10 ENCOUNTER — RESULT REVIEW (OUTPATIENT)
Age: 74
End: 2024-07-10

## 2024-07-10 ENCOUNTER — OUTPATIENT (OUTPATIENT)
Dept: OUTPATIENT SERVICES | Facility: HOSPITAL | Age: 74
LOS: 1 days | End: 2024-07-10
Payer: MEDICARE

## 2024-07-10 ENCOUNTER — APPOINTMENT (OUTPATIENT)
Dept: ULTRASOUND IMAGING | Facility: CLINIC | Age: 74
End: 2024-07-10
Payer: MEDICARE

## 2024-07-10 DIAGNOSIS — R92.8 OTHER ABNORMAL AND INCONCLUSIVE FINDINGS ON DIAGNOSTIC IMAGING OF BREAST: ICD-10-CM

## 2024-07-10 PROCEDURE — 19083 BX BREAST 1ST LESION US IMAG: CPT | Mod: LT

## 2024-07-10 PROCEDURE — 77065 DX MAMMO INCL CAD UNI: CPT | Mod: 26,LT

## 2024-07-10 PROCEDURE — 88360 TUMOR IMMUNOHISTOCHEM/MANUAL: CPT | Mod: 26

## 2024-07-10 PROCEDURE — 88305 TISSUE EXAM BY PATHOLOGIST: CPT | Mod: 26

## 2024-07-10 PROCEDURE — A4648: CPT

## 2024-07-10 PROCEDURE — 88305 TISSUE EXAM BY PATHOLOGIST: CPT

## 2024-07-10 PROCEDURE — 88360 TUMOR IMMUNOHISTOCHEM/MANUAL: CPT

## 2024-07-10 PROCEDURE — 77065 DX MAMMO INCL CAD UNI: CPT

## 2024-07-10 PROCEDURE — 19083 BX BREAST 1ST LESION US IMAG: CPT

## 2024-07-11 LAB
ALBUMIN SERPL ELPH-MCNC: 4.9 G/DL
ALP BLD-CCNC: 55 U/L
ALT SERPL-CCNC: 18 U/L
AST SERPL-CCNC: 18 U/L
BILIRUB SERPL-MCNC: 0.5 MG/DL
BUN SERPL-MCNC: 16 MG/DL
CALCIUM SERPL-MCNC: 10.1 MG/DL
CHOLEST SERPL-MCNC: 229 MG/DL
CO2 SERPL-SCNC: 23 MMOL/L
CREAT SERPL-MCNC: 0.62 MG/DL
EGFR: 93 ML/MIN/1.73M2
ESTIMATED AVERAGE GLUCOSE: 103 MG/DL
GLUCOSE SERPL-MCNC: 118 MG/DL
HBA1C MFR BLD HPLC: 5.2 %
HDLC SERPL-MCNC: 87 MG/DL
HGB BLD-MCNC: 15.2 G/DL
LDLC SERPL CALC-MCNC: 124 MG/DL
MCHC RBC-ENTMCNC: 34.4 GM/DL
MCV RBC AUTO: 106.8 FL
NONHDLC SERPL-MCNC: 141 MG/DL
PLATELET # BLD AUTO: 397 K/UL
POTASSIUM SERPL-SCNC: 4 MMOL/L
PROT SERPL-MCNC: 7.3 G/DL
RBC # BLD: 4.14 M/UL
RBC # FLD: 12.1 %
SODIUM SERPL-SCNC: 143 MMOL/L
TRIGL SERPL-MCNC: 101 MG/DL
TSH SERPL-ACNC: 3.14 UIU/ML

## 2024-07-15 ENCOUNTER — NON-APPOINTMENT (OUTPATIENT)
Age: 74
End: 2024-07-15

## 2024-07-18 ENCOUNTER — NON-APPOINTMENT (OUTPATIENT)
Age: 74
End: 2024-07-18

## 2024-07-18 ENCOUNTER — APPOINTMENT (OUTPATIENT)
Dept: SURGICAL ONCOLOGY | Facility: CLINIC | Age: 74
End: 2024-07-18

## 2024-07-18 VITALS
BODY MASS INDEX: 26.91 KG/M2 | HEIGHT: 62.5 IN | WEIGHT: 150 LBS | HEART RATE: 81 BPM | DIASTOLIC BLOOD PRESSURE: 86 MMHG | RESPIRATION RATE: 17 BRPM | SYSTOLIC BLOOD PRESSURE: 131 MMHG | OXYGEN SATURATION: 98 %

## 2024-07-18 DIAGNOSIS — C50.919 MALIGNANT NEOPLASM OF UNSPECIFIED SITE OF UNSPECIFIED FEMALE BREAST: ICD-10-CM

## 2024-07-18 PROCEDURE — 99205 OFFICE O/P NEW HI 60 MIN: CPT

## 2024-07-19 ENCOUNTER — NON-APPOINTMENT (OUTPATIENT)
Age: 74
End: 2024-07-19

## 2024-07-25 PROBLEM — C50.919 INVASIVE DUCTAL CARCINOMA OF BREAST, FEMALE: Status: ACTIVE | Noted: 2024-07-25

## 2024-07-31 ENCOUNTER — APPOINTMENT (OUTPATIENT)
Dept: MAMMOGRAPHY | Facility: IMAGING CENTER | Age: 74
End: 2024-07-31
Payer: MEDICARE

## 2024-07-31 ENCOUNTER — RESULT REVIEW (OUTPATIENT)
Age: 74
End: 2024-07-31

## 2024-07-31 ENCOUNTER — OUTPATIENT (OUTPATIENT)
Dept: OUTPATIENT SERVICES | Facility: HOSPITAL | Age: 74
LOS: 1 days | End: 2024-07-31
Payer: MEDICARE

## 2024-07-31 DIAGNOSIS — C50.919 MALIGNANT NEOPLASM OF UNSPECIFIED SITE OF UNSPECIFIED FEMALE BREAST: ICD-10-CM

## 2024-07-31 PROCEDURE — C1739: CPT

## 2024-07-31 PROCEDURE — 19281 PERQ DEVICE BREAST 1ST IMAG: CPT

## 2024-07-31 PROCEDURE — 19281 PERQ DEVICE BREAST 1ST IMAG: CPT | Mod: LT

## 2024-08-01 ENCOUNTER — NON-APPOINTMENT (OUTPATIENT)
Age: 74
End: 2024-08-01

## 2024-08-05 ENCOUNTER — APPOINTMENT (OUTPATIENT)
Dept: MAMMOGRAPHY | Facility: IMAGING CENTER | Age: 74
End: 2024-08-05

## 2024-08-09 ENCOUNTER — OUTPATIENT (OUTPATIENT)
Dept: OUTPATIENT SERVICES | Facility: HOSPITAL | Age: 74
LOS: 1 days | Discharge: HOME | End: 2024-08-09

## 2024-08-09 DIAGNOSIS — D47.3 ESSENTIAL (HEMORRHAGIC) THROMBOCYTHEMIA: ICD-10-CM

## 2024-08-16 ENCOUNTER — RESULT REVIEW (OUTPATIENT)
Age: 74
End: 2024-08-16

## 2024-08-16 ENCOUNTER — APPOINTMENT (OUTPATIENT)
Dept: HEMATOLOGY ONCOLOGY | Facility: CLINIC | Age: 74
End: 2024-08-16
Payer: MEDICARE

## 2024-08-16 VITALS
SYSTOLIC BLOOD PRESSURE: 133 MMHG | WEIGHT: 153 LBS | TEMPERATURE: 98.6 F | HEART RATE: 80 BPM | DIASTOLIC BLOOD PRESSURE: 79 MMHG | BODY MASS INDEX: 27.45 KG/M2 | RESPIRATION RATE: 16 BRPM | HEIGHT: 62.5 IN | OXYGEN SATURATION: 96 %

## 2024-08-16 DIAGNOSIS — C50.919 MALIGNANT NEOPLASM OF UNSPECIFIED SITE OF UNSPECIFIED FEMALE BREAST: ICD-10-CM

## 2024-08-16 DIAGNOSIS — D47.3 ESSENTIAL (HEMORRHAGIC) THROMBOCYTHEMIA: ICD-10-CM

## 2024-08-16 PROBLEM — H91.90 UNSPECIFIED HEARING LOSS, UNSPECIFIED EAR: Chronic | Status: ACTIVE | Noted: 2024-08-14

## 2024-08-16 PROBLEM — E55.9 VITAMIN D DEFICIENCY, UNSPECIFIED: Chronic | Status: ACTIVE | Noted: 2024-08-14

## 2024-08-16 PROBLEM — F41.9 ANXIETY DISORDER, UNSPECIFIED: Chronic | Status: ACTIVE | Noted: 2024-08-14

## 2024-08-16 PROBLEM — Z86.2 PERSONAL HISTORY OF DISEASES OF THE BLOOD AND BLOOD-FORMING ORGANS AND CERTAIN DISORDERS INVOLVING THE IMMUNE MECHANISM: Chronic | Status: ACTIVE | Noted: 2024-08-14

## 2024-08-16 PROBLEM — M19.90 UNSPECIFIED OSTEOARTHRITIS, UNSPECIFIED SITE: Chronic | Status: ACTIVE | Noted: 2024-08-14

## 2024-08-16 PROBLEM — M81.0 AGE-RELATED OSTEOPOROSIS WITHOUT CURRENT PATHOLOGICAL FRACTURE: Chronic | Status: ACTIVE | Noted: 2024-08-14

## 2024-08-16 PROBLEM — M16.11 UNILATERAL PRIMARY OSTEOARTHRITIS, RIGHT HIP: Chronic | Status: ACTIVE | Noted: 2024-08-14

## 2024-08-16 PROBLEM — E78.5 HYPERLIPIDEMIA, UNSPECIFIED: Chronic | Status: ACTIVE | Noted: 2024-08-14

## 2024-08-16 LAB
BASOPHILS # BLD AUTO: 0.01 K/UL — SIGNIFICANT CHANGE UP (ref 0–0.2)
BASOPHILS NFR BLD AUTO: 0.1 % — SIGNIFICANT CHANGE UP (ref 0–2)
EOSINOPHIL # BLD AUTO: 0.01 K/UL — SIGNIFICANT CHANGE UP (ref 0–0.5)
EOSINOPHIL NFR BLD AUTO: 0.1 % — SIGNIFICANT CHANGE UP (ref 0–6)
HCT VFR BLD CALC: 40.5 % — SIGNIFICANT CHANGE UP (ref 34.5–45)
HGB BLD-MCNC: 14.2 G/DL — SIGNIFICANT CHANGE UP (ref 11.5–15.5)
IMM GRANULOCYTES NFR BLD AUTO: 0.4 % — SIGNIFICANT CHANGE UP (ref 0–0.9)
LYMPHOCYTES # BLD AUTO: 1.15 K/UL — SIGNIFICANT CHANGE UP (ref 1–3.3)
LYMPHOCYTES # BLD AUTO: 13.5 % — SIGNIFICANT CHANGE UP (ref 13–44)
MCHC RBC-ENTMCNC: 35.1 G/DL — SIGNIFICANT CHANGE UP (ref 32–36)
MCHC RBC-ENTMCNC: 37.4 PG — HIGH (ref 27–34)
MCV RBC AUTO: 106.6 FL — HIGH (ref 80–100)
MONOCYTES # BLD AUTO: 0.54 K/UL — SIGNIFICANT CHANGE UP (ref 0–0.9)
MONOCYTES NFR BLD AUTO: 6.4 % — SIGNIFICANT CHANGE UP (ref 2–14)
NEUTROPHILS # BLD AUTO: 6.75 K/UL — SIGNIFICANT CHANGE UP (ref 1.8–7.4)
NEUTROPHILS NFR BLD AUTO: 79.5 % — HIGH (ref 43–77)
NRBC # BLD: 0 /100 WBCS — SIGNIFICANT CHANGE UP (ref 0–0)
PLATELET # BLD AUTO: 296 K/UL — SIGNIFICANT CHANGE UP (ref 150–400)
RBC # BLD: 3.8 M/UL — SIGNIFICANT CHANGE UP (ref 3.8–5.2)
RBC # FLD: 12.1 % — SIGNIFICANT CHANGE UP (ref 10.3–14.5)
WBC # BLD: 8.49 K/UL — SIGNIFICANT CHANGE UP (ref 3.8–10.5)
WBC # FLD AUTO: 8.49 K/UL — SIGNIFICANT CHANGE UP (ref 3.8–10.5)

## 2024-08-16 PROCEDURE — 99214 OFFICE O/P EST MOD 30 MIN: CPT

## 2024-08-16 NOTE — ASSESSMENT
[FreeTextEntry1] : 73 yoF with a h/o ET jak2+ diagnosed 4/2011 on HU 500mg daily tolerating well 1- -asymptomatic  Patient is on Hydroxyurea 500 mg daily, tolerated well. Recommended holding hydroxyurea for 2 days prior to surgery.  Could restart 48 hrs after surgery.  PLTs 296 K  Patient was diagnosed with invasive ductal carcinoma of the left breast Estrogen +, progesterone positive, stage 1. Scheduled for lumpectomy on 8/21/24.   2-Vitamin D deficiency on replacement therapy 3- HTN: On Losartan 4- HLD On Rosuvastatin 5- Depression: On Zolof 50 mg daily.  Greater than 50% of the encounter time was spent on counseling and coordination of care for   ET   and I have spent  40    minutes of face to face time with the patient.  Follow up in 6 months

## 2024-08-16 NOTE — HISTORY OF PRESENT ILLNESS
[Disease:__________________________] : Disease: [unfilled] [0 - No Distress] : Distress Level: 0 [90: Able to carry normal activity; minor signs or symptoms of disease.] : 90: Able to carry normal activity; minor signs or symptoms of disease.  [de-identified] : 70yoF with a h/o jak2 positive Essential Thrombocytosis diagnosed in 4/11 controlled on hydroxyurea 500 since that time. She has no history of thrombosis in the past.  She is taking aspirin daily.\par  \par  \par   [de-identified] : high risk b/c age [de-identified] : Patient is on Hydroxyurea 500 mg daily, tolerated well. PLTs 296 K  Patient was diagnosed with invasive ductal carcinoma of the left breast Estrogen +, progesterone positive, stage 1. Scheduled for lumpectomy on 8/21/24.  Denies fevers, night sweats or weight loss.   No other changes in medical, surgical or social history since 8/11/23.

## 2024-08-20 VITALS
DIASTOLIC BLOOD PRESSURE: 79 MMHG | WEIGHT: 153 LBS | HEIGHT: 63 IN | SYSTOLIC BLOOD PRESSURE: 135 MMHG | HEART RATE: 69 BPM | TEMPERATURE: 98 F | OXYGEN SATURATION: 100 % | RESPIRATION RATE: 18 BRPM

## 2024-08-20 LAB — NONINV COLON CA DNA+OCC BLD SCRN STL QL: NEGATIVE

## 2024-08-20 NOTE — ASU PREOPERATIVE ASSESSMENT, ADULT (IPARK ONLY) - INV PERIPH IV SIZE
Spiritual Care Partner Volunteer visited patient at [facility] in [facility] PROGRESSIVE CARE on 6/21/2024   Documented by:  Nolvia Mazariegos, MPS, BCC, Staff   [facility]     Paging Service [phone]       20 gauge

## 2024-08-20 NOTE — ASU PREOPERATIVE ASSESSMENT, ADULT (IPARK ONLY) - FALL HARM RISK - RISK INTERVENTIONS
Assistance OOB with selected safe patient handling equipment/Assistance with ambulation/Communicate Fall Risk and Risk Factors to all staff, patient, and family/Discuss with provider need for PT consult/Monitor gait and stability/Provide patient with walking aids - walker, cane, crutches/Reinforce activity limits and safety measures with patient and family/Review medications for side effects contributing to fall risk/Sit up slowly, dangle for a short time, stand at bedside before walking/Visual Cue: Yellow wristband/Bed in lowest position, wheels locked, appropriate side rails in place/Call bell, personal items and telephone in reach/Instruct patient to call for assistance before getting out of bed or chair/Non-slip footwear when patient is out of bed/[place] to call system/Physically safe environment - no spills, clutter or unnecessary equipment/Purposeful Proactive Rounding/Room/bathroom lighting operational, light cord in reach

## 2024-08-21 ENCOUNTER — APPOINTMENT (OUTPATIENT)
Dept: MAMMOGRAPHY | Facility: IMAGING CENTER | Age: 74
End: 2024-08-21

## 2024-08-21 ENCOUNTER — TRANSCRIPTION ENCOUNTER (OUTPATIENT)
Age: 74
End: 2024-08-21

## 2024-08-21 ENCOUNTER — OUTPATIENT (OUTPATIENT)
Dept: OUTPATIENT SERVICES | Facility: HOSPITAL | Age: 74
LOS: 1 days | End: 2024-08-21
Payer: COMMERCIAL

## 2024-08-21 ENCOUNTER — RESULT REVIEW (OUTPATIENT)
Age: 74
End: 2024-08-21

## 2024-08-21 ENCOUNTER — APPOINTMENT (OUTPATIENT)
Dept: SURGICAL ONCOLOGY | Facility: AMBULATORY SURGERY CENTER | Age: 74
End: 2024-08-21

## 2024-08-21 ENCOUNTER — OUTPATIENT (OUTPATIENT)
Dept: OUTPATIENT SERVICES | Facility: HOSPITAL | Age: 74
LOS: 1 days | Discharge: HOME | End: 2024-08-21
Payer: MEDICARE

## 2024-08-21 VITALS
HEART RATE: 82 BPM | OXYGEN SATURATION: 99 % | TEMPERATURE: 98 F | RESPIRATION RATE: 20 BRPM | SYSTOLIC BLOOD PRESSURE: 120 MMHG | DIASTOLIC BLOOD PRESSURE: 60 MMHG

## 2024-08-21 DIAGNOSIS — Z92.89 PERSONAL HISTORY OF OTHER MEDICAL TREATMENT: Chronic | ICD-10-CM

## 2024-08-21 DIAGNOSIS — Z98.890 OTHER SPECIFIED POSTPROCEDURAL STATES: Chronic | ICD-10-CM

## 2024-08-21 DIAGNOSIS — Z98.891 HISTORY OF UTERINE SCAR FROM PREVIOUS SURGERY: Chronic | ICD-10-CM

## 2024-08-21 DIAGNOSIS — Z96.652 PRESENCE OF LEFT ARTIFICIAL KNEE JOINT: Chronic | ICD-10-CM

## 2024-08-21 DIAGNOSIS — Z00.8 ENCOUNTER FOR OTHER GENERAL EXAMINATION: ICD-10-CM

## 2024-08-21 DIAGNOSIS — C50.919 MALIGNANT NEOPLASM OF UNSPECIFIED SITE OF UNSPECIFIED FEMALE BREAST: ICD-10-CM

## 2024-08-21 PROBLEM — I10 ESSENTIAL (PRIMARY) HYPERTENSION: Chronic | Status: ACTIVE | Noted: 2024-08-14

## 2024-08-21 PROCEDURE — 88307 TISSUE EXAM BY PATHOLOGIST: CPT | Mod: 26

## 2024-08-21 PROCEDURE — 76098 X-RAY EXAM SURGICAL SPECIMEN: CPT

## 2024-08-21 PROCEDURE — 19301 PARTIAL MASTECTOMY: CPT | Mod: LT

## 2024-08-21 PROCEDURE — 76098 X-RAY EXAM SURGICAL SPECIMEN: CPT | Mod: 26

## 2024-08-21 DEVICE — CLIP APPLIER COVIDIEN SURGICLIP 11.5" MEDIUM: Type: IMPLANTABLE DEVICE | Site: LEFT | Status: FUNCTIONAL

## 2024-08-21 NOTE — BRIEF OPERATIVE NOTE - OPERATION/FINDINGS
Left breast lumpectomy with GERMAINE localization. GERMAINE and biopsy clip in specimen, confirmed with xray. Left posterior margin was on the chest wall.

## 2024-08-21 NOTE — ASU DISCHARGE PLAN (ADULT/PEDIATRIC) - CALL YOUR DOCTOR IF YOU HAVE ANY OF THE FOLLOWING:
Bleeding that does not stop/Pain not relieved by Medications/Fever greater than (need to indicate Fahrenheit or Celsius)/Nausea and vomiting that does not stop Bleeding that does not stop/Swelling that gets worse/Pain not relieved by Medications/Fever greater than (need to indicate Fahrenheit or Celsius)/Wound/Surgical Site with redness, or foul smelling discharge or pus/Numbness, tingling, color or temperature change to extremity/Nausea and vomiting that does not stop/Unable to urinate/Inability to tolerate liquids or foods

## 2024-08-21 NOTE — ASU DISCHARGE PLAN (ADULT/PEDIATRIC) - CARE PROVIDER_API CALL
Reyes, Sylvia Alicia  Surgical Oncology  [address], [place] [address]  Phone: [phone]  Fax: [phone]  Established Patient  Follow Up Time: 2 weeks

## 2024-08-23 PROBLEM — G47.33 OBSTRUCTIVE SLEEP APNEA (ADULT) (PEDIATRIC): Chronic | Status: ACTIVE | Noted: 2024-08-14

## 2024-08-23 PROBLEM — M51.9 UNSPECIFIED THORACIC, THORACOLUMBAR AND LUMBOSACRAL INTERVERTEBRAL DISC DISORDER: Chronic | Status: ACTIVE | Noted: 2024-08-14

## 2024-08-28 LAB — SURGICAL PATHOLOGY STUDY: SIGNIFICANT CHANGE UP

## 2024-09-03 ENCOUNTER — APPOINTMENT (OUTPATIENT)
Dept: SURGICAL ONCOLOGY | Facility: CLINIC | Age: 74
End: 2024-09-03
Payer: MEDICARE

## 2024-09-03 VITALS
OXYGEN SATURATION: 97 % | BODY MASS INDEX: 26.58 KG/M2 | HEIGHT: 63 IN | SYSTOLIC BLOOD PRESSURE: 119 MMHG | DIASTOLIC BLOOD PRESSURE: 81 MMHG | HEART RATE: 81 BPM | WEIGHT: 150 LBS

## 2024-09-03 PROCEDURE — 99024 POSTOP FOLLOW-UP VISIT: CPT

## 2024-09-03 RX ORDER — CEPHALEXIN 500 MG/1
500 CAPSULE ORAL
Qty: 10 | Refills: 0 | Status: ACTIVE | COMMUNITY
Start: 2024-09-03 | End: 1900-01-01

## 2024-09-03 NOTE — RESULTS/DATA
[FreeTextEntry1] : BREAST PATH/RAD REVIEW.  [facility].  9/9/20 BL SC MG: BIRADs 2. SFGD. Stable L central nodular asymmetry.  9/10/21 BL SC MG: BIRADs 2. SFGD. Scattered benign calcs. R UOQ IM LN. Stable L central nodular asymmetry.  6/21/23 BL SC MG: BIRADS 1. SFGD. Punctate benign calcs.  6/24/24 BL SC MG: BIRADs 0. SFGD. BL benign appearing calcs. L UOQ lobulated mass, mid to posterior depth possibly representing IM LN. (Rec L Dx US).  7/2/24 L Dx US: BIRADs 4B. L 12N6 subtle 0.4 cm mixed echogenicity mass w/ posterior shadowing likely corresponding to mass on MG. (Rec US guided bx) 7/10/24 Left [12N6] US guided core bx: IDC (G1). LVP not seen. ER 95%, PA 95%, HER2 (0) Negative. Ribbon Clip. Concordant. (Rec Surg c/s) Post-bx MG: Ribbon clip seen in the L upper central region w/ overlying mammographically seen focal asymmetry   8/21/24 s/p L cisco loc PM. pT1b Nx.  1. Left PM: IDC (G1) to be 7 mm, negative margins for invasive carcinoma. DCIS (G2) focally <1mm grom posterior margin. Additional ADH & FEA. Micro-calcs associated w/ invasive carcinoma and benign epithelium.

## 2024-09-03 NOTE — HISTORY OF PRESENT ILLNESS
[de-identified] : JACOB GRAY is a 72 y/o jak2 + F referred by breast  w/ Left cT1N0 (G1) IDC +/+/- > pT1b Nx s/p L PM on 24. Here for post-op check.  Hematologist: Dr. Tanya Matthew.   24 (Initial visit): She denies any palpable breast masses, nipple discharge or skin changes.  Findings were discovered on routine breast screening (see below). Reports no systemic symptoms.  24 s/p L PM - IDC 7 mm, negative margins for invasive CA. DCIS (G2) focally <1mm grom posterior margin 9/3/24 POD#13 Reports noticing black and blue POD1 over the entire left breast, since then states the bruising has been improving and now area has a yellow-moreno tinge. Denies any breast pain, fever or chills. After surgery resumed her hydroxyurea 500 mg and held off on restarting her baby aspirin.   PMHx: Right hip arthritis, vertebral disc disease, jak2 + essential thrombocytosis diagnosed in  controlled on hydroxyurea 500 mg, Vitamin D deficiency, HTN, HLD, anxiety. Deviated septum, Hard of hearing (L>R) uses hearing aids PSHx: L TKR, Left hip repair  with pins in place,  x 2.  Meds: Alprazolam 0.25 mg qHS PRN, ASA 81 mg. Hydroxyurea 500 mg QD, Calcium 500 mg, famotidine 20 mg, Losartan 25 mg, Multivitamin. Rosuvastatin 5 mg, sertraline 50 mg QD, Vitamin D3. Magnesium.  Allergies: Zithromax- Hives.  Family Hx: No family hx of breast or ovarian cancer.  GYN: , menarche age 13, Menopause at age 50-55. Age at first pregnancy 39.  Y (3 months)  Hx of OCPs for 20 years from age [age] Bra size: 38 B  Occupation: Retired.  Social: Former Smoker for 10 years, quit    ETOH: Drinks wine, typically 7 times a week.

## 2024-09-03 NOTE — REASON FOR VISIT
[de-identified] : Left cisco localized lumpectomy  [de-identified] : 8/21/24 [de-identified] : 11

## 2024-09-03 NOTE — PHYSICAL EXAM
[Normal] : supple, no neck mass and thyroid not enlarged [Normal] : oriented to person, place and time, with appropriate affect [de-identified] : Large left breast hematoma with ecchymosis along central and lower lateral left breast.  incision is c/d/i [de-identified] : Normal respiratory effort

## 2024-09-03 NOTE — ASSESSMENT
[FreeTextEntry1] : JACOB GRAY is a 74 y/o jak2 + F w/ Left cT1N0 (G1) IDC +/+/- > pT1b Nx s/p L PM on 8/21/24. Here for post-op check.   Large post-lumpectomy left breast hematoma appears stable with significant ecchymosis and minimal pain.  Ultrasound was used to visualize the hematoma.   using sterile technique, an 18g needle was inserted to hematoma with 15cc serosanguinous return.   The inframammary incision was anesthetized with 8cc 1%lidocaine with epi and 1cm portion of the incision was opened with an 11-blade scalpel.   The left breast was massaged to extract the residual hematoma.  No active bleeding was appreciated.  Compression dressing was applied.    We discussed her surgical Pathology with confirmed left IDC (G1) to be 7 mm- negative margins for invasive carcinoma, associated DCIS (G2) focally <1 mm from posterior margin.   No further surgery is necessary.  The final lumpectomy margin was taken down to muscle.   Return in 2 days for take-down of compression dressing and re-evaluation of wound.   To see Medical Oncology & Radiation Oncology for adjuvant treatment recommendations.  Next imaging: BL Dx MG/US due 6/2025.

## 2024-09-03 NOTE — HISTORY OF PRESENT ILLNESS
[de-identified] : JACOB GRAY is a 74 y/o jak2 + F referred by breast  w/ Left cT1N0 (G1) IDC +/+/- > pT1b Nx s/p L PM on 24. Here for post-op check.  Hematologist: Dr. Tanya Matthew.   24 (Initial visit): She denies any palpable breast masses, nipple discharge or skin changes.  Findings were discovered on routine breast screening (see below). Reports no systemic symptoms.  24 s/p L PM - IDC 7 mm, negative margins for invasive CA. DCIS (G2) focally <1mm grom posterior margin 9/3/24 POD#13 Reports noticing black and blue POD1 over the entire left breast, since then states the bruising has been improving and now area has a yellow-moreno tinge. Denies any breast pain, fever or chills. After surgery resumed her hydroxyurea 500 mg and held off on restarting her baby aspirin.   PMHx: Right hip arthritis, vertebral disc disease, jak2 + essential thrombocytosis diagnosed in  controlled on hydroxyurea 500 mg, Vitamin D deficiency, HTN, HLD, anxiety. Deviated septum, Hard of hearing (L>R) uses hearing aids PSHx: L TKR, Left hip repair  with pins in place,  x 2.  Meds: Alprazolam 0.25 mg qHS PRN, ASA 81 mg. Hydroxyurea 500 mg QD, Calcium 500 mg, famotidine 20 mg, Losartan 25 mg, Multivitamin. Rosuvastatin 5 mg, sertraline 50 mg QD, Vitamin D3. Magnesium.  Allergies: Zithromax- Hives.  Family Hx: No family hx of breast or ovarian cancer.  GYN: , menarche age 13, Menopause at age 50-55. Age at first pregnancy 39.  Y (3 months)  Hx of OCPs for 20 years from age [age] Bra size: 38 B  Occupation: Retired.  Social: Former Smoker for 10 years, quit    ETOH: Drinks wine, typically 7 times a week.

## 2024-09-03 NOTE — ASSESSMENT
[FreeTextEntry1] : JACOB GRAY is a 72 y/o jak2 + F w/ Left cT1N0 (G1) IDC +/+/- > pT1b Nx s/p L PM on 8/21/24. Here for post-op check.   Large post-lumpectomy left breast hematoma appears stable with significant ecchymosis and minimal pain.  Ultrasound was used to visualize the hematoma.   using sterile technique, an 18g needle was inserted to hematoma with 15cc serosanguinous return.   The inframammary incision was anesthetized with 8cc 1%lidocaine with epi and 1cm portion of the incision was opened with an 11-blade scalpel.   The left breast was massaged to extract the residual hematoma.  No active bleeding was appreciated.  Compression dressing was applied.    We discussed her surgical Pathology with confirmed left IDC (G1) to be 7 mm- negative margins for invasive carcinoma, associated DCIS (G2) focally <1 mm from posterior margin.   No further surgery is necessary.  The final lumpectomy margin was taken down to muscle.   Return in 2 days for take-down of compression dressing and re-evaluation of wound.   To see Medical Oncology & Radiation Oncology for adjuvant treatment recommendations.  Next imaging: BL Dx MG/US due 6/2025.

## 2024-09-03 NOTE — RESULTS/DATA
[FreeTextEntry1] : BREAST PATH/RAD REVIEW.  [facility].  9/9/20 BL SC MG: BIRADs 2. SFGD. Stable L central nodular asymmetry.  9/10/21 BL SC MG: BIRADs 2. SFGD. Scattered benign calcs. R UOQ IM LN. Stable L central nodular asymmetry.  6/21/23 BL SC MG: BIRADS 1. SFGD. Punctate benign calcs.  6/24/24 BL SC MG: BIRADs 0. SFGD. BL benign appearing calcs. L UOQ lobulated mass, mid to posterior depth possibly representing IM LN. (Rec L Dx US).  7/2/24 L Dx US: BIRADs 4B. L 12N6 subtle 0.4 cm mixed echogenicity mass w/ posterior shadowing likely corresponding to mass on MG. (Rec US guided bx) 7/10/24 Left [12N6] US guided core bx: IDC (G1). LVP not seen. ER 95%, IL 95%, HER2 (0) Negative. Ribbon Clip. Concordant. (Rec Surg c/s) Post-bx MG: Ribbon clip seen in the L upper central region w/ overlying mammographically seen focal asymmetry   8/21/24 s/p L cisco loc PM. pT1b Nx.  1. Left PM: IDC (G1) to be 7 mm, negative margins for invasive carcinoma. DCIS (G2) focally <1mm grom posterior margin. Additional ADH & FEA. Micro-calcs associated w/ invasive carcinoma and benign epithelium.

## 2024-09-03 NOTE — REASON FOR VISIT
[de-identified] : Left cisco localized lumpectomy  [de-identified] : 8/21/24 [de-identified] : 11

## 2024-09-03 NOTE — PHYSICAL EXAM
[Normal] : supple, no neck mass and thyroid not enlarged [Normal] : oriented to person, place and time, with appropriate affect [de-identified] : Large left breast hematoma with ecchymosis along central and lower lateral left breast.  incision is c/d/i [de-identified] : Normal respiratory effort

## 2024-09-04 PROBLEM — Z87.891 PERSONAL HISTORY OF NICOTINE DEPENDENCE: Chronic | Status: ACTIVE | Noted: 2024-08-14

## 2024-09-05 ENCOUNTER — APPOINTMENT (OUTPATIENT)
Dept: SURGICAL ONCOLOGY | Facility: CLINIC | Age: 74
End: 2024-09-05
Payer: MEDICARE

## 2024-09-05 ENCOUNTER — INPATIENT (INPATIENT)
Facility: HOSPITAL | Age: 74
LOS: 0 days | Discharge: HOME | End: 2024-09-06
Attending: SURGERY | Admitting: SURGERY
Payer: MEDICARE

## 2024-09-05 VITALS
HEART RATE: 83 BPM | SYSTOLIC BLOOD PRESSURE: 144 MMHG | DIASTOLIC BLOOD PRESSURE: 84 MMHG | TEMPERATURE: 98 F | WEIGHT: 149.91 LBS | OXYGEN SATURATION: 98 % | RESPIRATION RATE: 20 BRPM | HEIGHT: 63 IN

## 2024-09-05 VITALS
OXYGEN SATURATION: 99 % | WEIGHT: 150 LBS | BODY MASS INDEX: 25.61 KG/M2 | HEIGHT: 64 IN | HEART RATE: 74 BPM | SYSTOLIC BLOOD PRESSURE: 131 MMHG | DIASTOLIC BLOOD PRESSURE: 84 MMHG

## 2024-09-05 DIAGNOSIS — Z98.891 HISTORY OF UTERINE SCAR FROM PREVIOUS SURGERY: Chronic | ICD-10-CM

## 2024-09-05 DIAGNOSIS — Z96.652 PRESENCE OF LEFT ARTIFICIAL KNEE JOINT: Chronic | ICD-10-CM

## 2024-09-05 DIAGNOSIS — Z98.890 OTHER SPECIFIED POSTPROCEDURAL STATES: Chronic | ICD-10-CM

## 2024-09-05 DIAGNOSIS — N64.89 OTHER SPECIFIED DISORDERS OF BREAST: ICD-10-CM

## 2024-09-05 DIAGNOSIS — C50.919 MALIGNANT NEOPLASM OF UNSPECIFIED SITE OF UNSPECIFIED FEMALE BREAST: ICD-10-CM

## 2024-09-05 DIAGNOSIS — Z92.89 PERSONAL HISTORY OF OTHER MEDICAL TREATMENT: Chronic | ICD-10-CM

## 2024-09-05 LAB
ADD ON TEST-SPECIMEN IN LAB: SIGNIFICANT CHANGE UP
ALBUMIN SERPL ELPH-MCNC: 4.3 G/DL — SIGNIFICANT CHANGE UP (ref 3.3–5)
ALP SERPL-CCNC: 61 U/L — SIGNIFICANT CHANGE UP (ref 40–120)
ALT FLD-CCNC: 13 U/L — SIGNIFICANT CHANGE UP (ref 4–33)
ANION GAP SERPL CALC-SCNC: 13 MMOL/L — SIGNIFICANT CHANGE UP (ref 7–14)
APTT BLD: 31.5 SEC — SIGNIFICANT CHANGE UP (ref 24.5–35.6)
AST SERPL-CCNC: 16 U/L — SIGNIFICANT CHANGE UP (ref 4–32)
BASOPHILS # BLD AUTO: 0.01 K/UL — SIGNIFICANT CHANGE UP (ref 0–0.2)
BASOPHILS NFR BLD AUTO: 0.1 % — SIGNIFICANT CHANGE UP (ref 0–2)
BILIRUB SERPL-MCNC: 0.8 MG/DL — SIGNIFICANT CHANGE UP (ref 0.2–1.2)
BLD GP AB SCN SERPL QL: NEGATIVE — SIGNIFICANT CHANGE UP
BUN SERPL-MCNC: 9 MG/DL — SIGNIFICANT CHANGE UP (ref 7–23)
CALCIUM SERPL-MCNC: 9.8 MG/DL — SIGNIFICANT CHANGE UP (ref 8.4–10.5)
CHLORIDE SERPL-SCNC: 101 MMOL/L — SIGNIFICANT CHANGE UP (ref 98–107)
CO2 SERPL-SCNC: 24 MMOL/L — SIGNIFICANT CHANGE UP (ref 22–31)
CREAT SERPL-MCNC: 0.54 MG/DL — SIGNIFICANT CHANGE UP (ref 0.5–1.3)
EGFR: 96 ML/MIN/1.73M2 — SIGNIFICANT CHANGE UP
EOSINOPHIL # BLD AUTO: 0.01 K/UL — SIGNIFICANT CHANGE UP (ref 0–0.5)
EOSINOPHIL NFR BLD AUTO: 0.1 % — SIGNIFICANT CHANGE UP (ref 0–6)
GLUCOSE SERPL-MCNC: 110 MG/DL — HIGH (ref 70–99)
HCT VFR BLD CALC: 36.7 % — SIGNIFICANT CHANGE UP (ref 34.5–45)
HGB BLD-MCNC: 12.8 G/DL — SIGNIFICANT CHANGE UP (ref 11.5–15.5)
IANC: 9.36 K/UL — HIGH (ref 1.8–7.4)
IMM GRANULOCYTES NFR BLD AUTO: 0.4 % — SIGNIFICANT CHANGE UP (ref 0–0.9)
INR BLD: 0.99 RATIO — SIGNIFICANT CHANGE UP (ref 0.85–1.18)
LYMPHOCYTES # BLD AUTO: 1.1 K/UL — SIGNIFICANT CHANGE UP (ref 1–3.3)
LYMPHOCYTES # BLD AUTO: 9.9 % — LOW (ref 13–44)
MAGNESIUM SERPL-MCNC: 2.2 MG/DL — SIGNIFICANT CHANGE UP (ref 1.6–2.6)
MCHC RBC-ENTMCNC: 34.9 GM/DL — SIGNIFICANT CHANGE UP (ref 32–36)
MCHC RBC-ENTMCNC: 36.8 PG — HIGH (ref 27–34)
MCV RBC AUTO: 105.5 FL — HIGH (ref 80–100)
MONOCYTES # BLD AUTO: 0.61 K/UL — SIGNIFICANT CHANGE UP (ref 0–0.9)
MONOCYTES NFR BLD AUTO: 5.5 % — SIGNIFICANT CHANGE UP (ref 2–14)
NEUTROPHILS # BLD AUTO: 9.36 K/UL — HIGH (ref 1.8–7.4)
NEUTROPHILS NFR BLD AUTO: 84 % — HIGH (ref 43–77)
NRBC # BLD: 0 /100 WBCS — SIGNIFICANT CHANGE UP (ref 0–0)
NRBC # FLD: 0 K/UL — SIGNIFICANT CHANGE UP (ref 0–0)
PHOSPHATE SERPL-MCNC: 2.5 MG/DL — SIGNIFICANT CHANGE UP (ref 2.5–4.5)
PLATELET # BLD AUTO: 346 K/UL — SIGNIFICANT CHANGE UP (ref 150–400)
POTASSIUM SERPL-MCNC: 4.1 MMOL/L — SIGNIFICANT CHANGE UP (ref 3.5–5.3)
POTASSIUM SERPL-SCNC: 4.1 MMOL/L — SIGNIFICANT CHANGE UP (ref 3.5–5.3)
PROT SERPL-MCNC: 6.8 G/DL — SIGNIFICANT CHANGE UP (ref 6–8.3)
PROTHROM AB SERPL-ACNC: 11.1 SEC — SIGNIFICANT CHANGE UP (ref 9.5–13)
RBC # BLD: 3.48 M/UL — LOW (ref 3.8–5.2)
RBC # FLD: 13.5 % — SIGNIFICANT CHANGE UP (ref 10.3–14.5)
RH IG SCN BLD-IMP: POSITIVE — SIGNIFICANT CHANGE UP
SODIUM SERPL-SCNC: 138 MMOL/L — SIGNIFICANT CHANGE UP (ref 135–145)
TSH SERPL-MCNC: 2.34 UIU/ML — SIGNIFICANT CHANGE UP (ref 0.27–4.2)
WBC # BLD: 11.14 K/UL — HIGH (ref 3.8–10.5)
WBC # FLD AUTO: 11.14 K/UL — HIGH (ref 3.8–10.5)

## 2024-09-05 PROCEDURE — 99024 POSTOP FOLLOW-UP VISIT: CPT

## 2024-09-05 PROCEDURE — 71046 X-RAY EXAM CHEST 2 VIEWS: CPT | Mod: 26

## 2024-09-05 PROCEDURE — 99285 EMERGENCY DEPT VISIT HI MDM: CPT

## 2024-09-05 PROCEDURE — 21501 I&D DP ABSC/HMTMA SFT TS NCK: CPT | Mod: 78

## 2024-09-05 DEVICE — SURGICEL FIBRILLAR 2 X 4": Type: IMPLANTABLE DEVICE | Site: LEFT | Status: FUNCTIONAL

## 2024-09-05 DEVICE — ARISTA 3GR: Type: IMPLANTABLE DEVICE | Site: LEFT | Status: FUNCTIONAL

## 2024-09-05 RX ORDER — CEFAZOLIN SODIUM 2 G/100ML
INJECTION, SOLUTION INTRAVENOUS
Refills: 0 | Status: DISCONTINUED | OUTPATIENT
Start: 2024-09-05 | End: 2024-09-05

## 2024-09-05 RX ORDER — CEFAZOLIN SODIUM 2 G/100ML
1000 INJECTION, SOLUTION INTRAVENOUS ONCE
Refills: 0 | Status: COMPLETED | OUTPATIENT
Start: 2024-09-05 | End: 2024-09-05

## 2024-09-05 RX ORDER — CEFAZOLIN SODIUM 2 G/100ML
1000 INJECTION, SOLUTION INTRAVENOUS EVERY 8 HOURS
Refills: 0 | Status: DISCONTINUED | OUTPATIENT
Start: 2024-09-06 | End: 2024-09-06

## 2024-09-05 RX ORDER — CEFAZOLIN SODIUM 2 G/100ML
INJECTION, SOLUTION INTRAVENOUS
Refills: 0 | Status: DISCONTINUED | OUTPATIENT
Start: 2024-09-05 | End: 2024-09-06

## 2024-09-05 RX ORDER — HYDROMORPHONE HYDROCHLORIDE 2 MG/1
0.25 TABLET ORAL
Refills: 0 | Status: DISCONTINUED | OUTPATIENT
Start: 2024-09-05 | End: 2024-09-05

## 2024-09-05 RX ADMIN — CEFAZOLIN SODIUM 100 MILLIGRAM(S): 2 INJECTION, SOLUTION INTRAVENOUS at 22:12

## 2024-09-05 RX ADMIN — Medication 100 MILLILITER(S): at 22:13

## 2024-09-05 RX ADMIN — Medication 100 MILLILITER(S): at 14:06

## 2024-09-05 NOTE — HISTORY OF PRESENT ILLNESS
[de-identified] : JACOB GRAY is a 74 y/o jak2 + F w/ Left cT1N0 (G1) IDC +/+/- > pT1b Nx s/p L PM on 24 course c/b hematoma. Here for wound check.   Hematologist: Dr. Tanya Matthew.   24 (Initial visit): She denies any palpable breast masses, nipple discharge or skin changes.  Findings were discovered on routine breast screening (see below). Reports no systemic symptoms.  24 s/p L PM - IDC 7 mm, negative margins for invasive CA. DCIS (G2) focally <1mm grom posterior margin 9/3/24 POD#13 Reports noticing black and blue POD1 over the entire left breast, since then states the bruising has been improving and now area has a yellow-moreno tinge. Denies any breast pain, fever or chills. After surgery resumed her hydroxyurea 500 mg and held off on restarting her baby aspirin.  24 Reports original compressive dressing stayed in place until Wednesday morning, at that time she removed it and replaced it with another ACE after noticing the bottom becoming stained with blood. Currently there is no compressive dressing in place as it was removed last night. Denies any fevers or chills. Currently denies being lightheaded, vitals are stable in office. Last ate 2 Oreos before coming into the office.    PMHx: Right hip arthritis, vertebral disc disease, jak2 + essential thrombocytosis diagnosed in  controlled on hydroxyurea 500 mg, Vitamin D deficiency, HTN, HLD, anxiety. Deviated septum, Hard of hearing (L>R) uses hearing aids PSHx: L TKR, Left hip repair  with pins in place,  x 2.  Meds: Alprazolam 0.25 mg qHS PRN, ASA 81 mg. Hydroxyurea 500 mg QD, Calcium 500 mg, famotidine 20 mg, Losartan 25 mg, Multivitamin. Rosuvastatin 5 mg, sertraline 50 mg QD, Vitamin D3. Magnesium.  Allergies: Zithromax- Hives.  Family Hx: No family hx of breast or ovarian cancer.  GYN: , menarche age 13, Menopause at age 50-53. Age at first pregnancy 39.  Y (3 months)  Hx of OCPs for 20 years from age [age] Bra size: 38 B  Occupation: Retired.  Social: Former Smoker for 10 years, quit    ETOH: Drinks wine, typically 7 times a week.

## 2024-09-05 NOTE — H&P ADULT - NSHPLABSRESULTS_GEN_ALL_CORE
LABS:                        12.8   11.14 )-----------( 346      ( 05 Sep 2024 11:20 )             36.7     09-05    138  |  101  |  9   ----------------------------<  110<H>  4.1   |  24  |  0.54    Ca    9.8      05 Sep 2024 11:20  Phos  2.5     09-05  Mg     2.20     09-05    TPro  6.8  /  Alb  4.3  /  TBili  0.8  /  DBili  x   /  AST  16  /  ALT  13  /  AlkPhos  61  09-05    PT/INR - ( 05 Sep 2024 11:20 )   PT: 11.1 sec;   INR: 0.99 ratio      PTT - ( 05 Sep 2024 11:20 )  PTT:31.5 sec    Albumin: 4.3 g/dL (09-05-24 @ 11:20)

## 2024-09-05 NOTE — ED PROVIDER NOTE - NSICDXPASTMEDICALHX_GEN_ALL_CORE_FT
PAST MEDICAL HISTORY:  Anxiety and depression     Arthritis of right hip     Former smoker     H/O thrombocytosis     Hearing deficit     HLD (hyperlipidemia)     HTN (hypertension)     Lumbar disc disease     Malignant neoplasm of breast     SHERIF (obstructive sleep apnea)     Osteoarthritis     Osteoporosis     Vitamin D deficiency

## 2024-09-05 NOTE — CHART NOTE - NSCHARTNOTEFT_GEN_A_CORE
70yoF with a h/o jak2 positive Essential Thrombocytosis diagnosed in 4/11 controlled on hydroxyurea 500 since that time.     She is s/p left breast lumpectomy (8/21/24 with Dr. Reyes) presenting to the ED with L breast swelling and bruising. Patient states shes had persistent bruising, fullness, and pain of the left breast since surgery, found to have a hematoma. Patient is s/p attempted percutaneous drainage outpatient however unable to be adequately drained.     Per outpatient note from Dr. Matthew, recommend the following:  Recommended holding hydroxyurea for 2 days prior to surgery. Could restart 48 hrs after surgery.  No further recommendation per heme in the meantime. Please call back if there is further questions. 70yoF with a h/o jak2 positive Essential Thrombocytosis diagnosed in 4/11 controlled on hydroxyurea 500 since that time.     She is s/p left breast lumpectomy (8/21/24 with Dr. Reyes) presenting to the ED with L breast swelling and bruising. Patient states shes had persistent bruising, fullness, and pain of the left breast since surgery, found to have a hematoma. Patient is s/p attempted percutaneous drainage outpatient however unable to be adequately drained.     Per outpatient note from Dr. Matthew, recommend the following:  Recommended holding hydroxyurea for 2 days prior to surgery. Could restart 48 hrs after surgery.  Pt didn't follow the instruction and took hydroxyurea this morning.       No further recommendation per heme in the meantime.  There is no reversal agent for Hydroxyurea. Patient's platelet count is 346k. Her count has been stable for 2+ years. Please call back if there is further questions. 70yoF with a h/o jak2 positive Essential Thrombocytosis diagnosed in 4/11 controlled on hydroxyurea 500 since that time.     She is s/p left breast lumpectomy (8/21/24 with Dr. Reyes) presenting to the ED with L breast swelling and bruising. Patient states shes had persistent bruising, fullness, and pain of the left breast since surgery, found to have a hematoma. Patient is s/p attempted percutaneous drainage outpatient however unable to be adequately drained.     # ET, SHIVANI 2+  Recommended holding hydroxyurea for 2 days prior to surgery. Could restart 48 hrs after surgery.  Aspirin has been on hold  Pt didn't follow the instruction and took hydroxyurea this morning.   However, CBC is stable with normal plt  No further recommendation per heme in the meantime.  There is no reversal agent for Hydroxyurea. Patient's platelet count is 346k.   Her count has been stable for 2+ years. Please call back if there is further questions. 70yoF with a h/o jak2 positive Essential Thrombocytosis diagnosed in 4/11 controlled on hydroxyurea 500 since that time.     She is s/p left breast lumpectomy (8/21/24 with Dr. Reyes) presenting to the ED with L breast swelling and bruising. Patient states shes had persistent bruising, fullness, and pain of the left breast since surgery, found to have a hematoma. Patient is s/p attempted percutaneous drainage outpatient however unable to be adequately drained.     # ET, SHIVANI 2+  Recommended holding hydroxyurea for 2 days prior to surgery. Could restart 48 hrs after surgery.  Aspirin has been on hold  Pt didn't follow the instruction and took hydroxyurea this morning.   However, CBC is stable with normal plt. No contraindication for the procedure  No further recommendation per heme in the meantime.  There is no reversal agent for Hydroxyurea. Patient's platelet count is 346k.   Her count has been stable for 2+ years. Please call back if there is further questions.    Case discussed with Dr. Foote

## 2024-09-05 NOTE — ED PROVIDER NOTE - CLINICAL SUMMARY MEDICAL DECISION MAKING FREE TEXT BOX
73-year-old female, medical history significant for breast CA, hyperlipidemia, presenting for left breast hematoma drainage.  The patient was seen by Dr. Reyes outpatient and was told to present to the hospital for further management.  The patient reports that on August 21 she had a mass removed from her left breast that was concerning for CA.  2 days ago the patient went to the office and followed up and was told that she had a hematoma of the breast.  The patient reports that she had drainage done at this time.  The patient returned today and was told that she needed to come to the hospital because there was blood and she was bleeding internally.  The patient denies any pain of the breast at rest but states when she touches it she does have pain.  The patient reports that she is taking Tylenol and this has helped her with her symptoms.  The patient states that she does not have any chest pain, shortness of breath, headaches or visual changes but does feel lightheaded.  VSS.  PE.  Patient with extensive bruising of the left breast.  Bruising is extending from the inferior breast down to the chest wall.  Bruising sparing the axilla. Patient with no other complaints or concerns at this time, will consult surgery, will obtain pre-operative labs, final disposition likely admission for management of breast hematoma.

## 2024-09-05 NOTE — PHYSICAL EXAM
[Normal] : supple, no neck mass and thyroid not enlarged [Normal] : oriented to person, place and time, with appropriate affect [de-identified] : Large left breast hematoma with ecchymosis along central and lower lateral left breast.  incision is c/d/i [de-identified] : Normal respiratory effort

## 2024-09-05 NOTE — ED ADULT NURSE REASSESSMENT NOTE - NS ED NURSE REASSESS COMMENT FT1
Pt resting in spot 1a. Pt denies physical complaints at this time. Airway is patent, respirations are even and unlabored. Plan of care ongoing, safety maintained.

## 2024-09-05 NOTE — ED PROVIDER NOTE - PHYSICAL EXAMINATION
GENERAL: Awake, alert, NAD  HEENT: NC/AT, moist mucous membranes, PERRL, EOMI  LUNGS: CTAB, no wheezes or crackles   CARDIAC: RRR, no m/r/g  Breast: Patient with extensive bruising of the left breast.  Bruising is extending from the inferior breast down to the chest wall.  Bruising sparing the axilla.   ABDOMEN: Soft, non tender, non distended, no rebound, no guarding  BACK: No midline spinal tenderness, no CVA tenderness  EXT: No edema, no calf tenderness, 2+ DP pulses bilaterally, no deformities.  NEURO: A&Ox3. Moving all extremities.  SKIN: Warm and dry. No rash.  PSYCH: Normal affect.

## 2024-09-05 NOTE — RESULTS/DATA
[FreeTextEntry1] : BREAST PATH/RAD REVIEW.  [facility].  9/9/20 BL SC MG: BIRADs 2. SFGD. Stable L central nodular asymmetry.  9/10/21 BL SC MG: BIRADs 2. SFGD. Scattered benign calcs. R UOQ IM LN. Stable L central nodular asymmetry.  6/21/23 BL SC MG: BIRADS 1. SFGD. Punctate benign calcs.  6/24/24 BL SC MG: BIRADs 0. SFGD. BL benign appearing calcs. L UOQ lobulated mass, mid to posterior depth possibly representing IM LN. (Rec L Dx US).  7/2/24 L Dx US: BIRADs 4B. L 12N6 subtle 0.4 cm mixed echogenicity mass w/ posterior shadowing likely corresponding to mass on MG. (Rec US guided bx) 7/10/24 Left [12N6] US guided core bx: IDC (G1). LVP not seen. ER 95%, KS 95%, HER2 (0) Negative. Ribbon Clip. Concordant. (Rec Surg c/s) Post-bx MG: Ribbon clip seen in the L upper central region w/ overlying mammographically seen focal asymmetry   8/21/24 s/p L cisco loc PM. pT1b Nx.  1. Left PM: IDC (G1) to be 7 mm, negative margins for invasive carcinoma. DCIS (G2) focally <1mm grom posterior margin. Additional ADH & FEA. Micro-calcs associated w/ invasive carcinoma and benign epithelium.

## 2024-09-05 NOTE — HISTORY OF PRESENT ILLNESS
[de-identified] : JACOB GRAY is a 74 y/o jak2 + F w/ Left cT1N0 (G1) IDC +/+/- > pT1b Nx s/p L PM on 24 course c/b hematoma. Here for wound check.   Hematologist: Dr. Tanya Matthew.   24 (Initial visit): She denies any palpable breast masses, nipple discharge or skin changes.  Findings were discovered on routine breast screening (see below). Reports no systemic symptoms.  24 s/p L PM - IDC 7 mm, negative margins for invasive CA. DCIS (G2) focally <1mm grom posterior margin 9/3/24 POD#13 Reports noticing black and blue POD1 over the entire left breast, since then states the bruising has been improving and now area has a yellow-moreno tinge. Denies any breast pain, fever or chills. After surgery resumed her hydroxyurea 500 mg and held off on restarting her baby aspirin.  24 Reports original compressive dressing stayed in place until Wednesday morning, at that time she removed it and replaced it with another ACE after noticing the bottom becoming stained with blood. Currently there is no compressive dressing in place as it was removed last night. Denies any fevers or chills. Currently denies being lightheaded, vitals are stable in office. Last ate 2 Oreos before coming into the office.    PMHx: Right hip arthritis, vertebral disc disease, jak2 + essential thrombocytosis diagnosed in  controlled on hydroxyurea 500 mg, Vitamin D deficiency, HTN, HLD, anxiety. Deviated septum, Hard of hearing (L>R) uses hearing aids PSHx: L TKR, Left hip repair  with pins in place,  x 2.  Meds: Alprazolam 0.25 mg qHS PRN, ASA 81 mg. Hydroxyurea 500 mg QD, Calcium 500 mg, famotidine 20 mg, Losartan 25 mg, Multivitamin. Rosuvastatin 5 mg, sertraline 50 mg QD, Vitamin D3. Magnesium.  Allergies: Zithromax- Hives.  Family Hx: No family hx of breast or ovarian cancer.  GYN: , menarche age 13, Menopause at age 50-53. Age at first pregnancy 39.  Y (3 months)  Hx of OCPs for 20 years from age [age] Bra size: 38 B  Occupation: Retired.  Social: Former Smoker for 10 years, quit    ETOH: Drinks wine, typically 7 times a week.

## 2024-09-05 NOTE — RESULTS/DATA
[FreeTextEntry1] : BREAST PATH/RAD REVIEW.  [facility].  9/9/20 BL SC MG: BIRADs 2. SFGD. Stable L central nodular asymmetry.  9/10/21 BL SC MG: BIRADs 2. SFGD. Scattered benign calcs. R UOQ IM LN. Stable L central nodular asymmetry.  6/21/23 BL SC MG: BIRADS 1. SFGD. Punctate benign calcs.  6/24/24 BL SC MG: BIRADs 0. SFGD. BL benign appearing calcs. L UOQ lobulated mass, mid to posterior depth possibly representing IM LN. (Rec L Dx US).  7/2/24 L Dx US: BIRADs 4B. L 12N6 subtle 0.4 cm mixed echogenicity mass w/ posterior shadowing likely corresponding to mass on MG. (Rec US guided bx) 7/10/24 Left [12N6] US guided core bx: IDC (G1). LVP not seen. ER 95%, MS 95%, HER2 (0) Negative. Ribbon Clip. Concordant. (Rec Surg c/s) Post-bx MG: Ribbon clip seen in the L upper central region w/ overlying mammographically seen focal asymmetry   8/21/24 s/p L cisco loc PM. pT1b Nx.  1. Left PM: IDC (G1) to be 7 mm, negative margins for invasive carcinoma. DCIS (G2) focally <1mm grom posterior margin. Additional ADH & FEA. Micro-calcs associated w/ invasive carcinoma and benign epithelium.

## 2024-09-05 NOTE — ED ADULT NURSE NOTE - NSFALLUNIVINTERV_ED_ALL_ED
Bed/Stretcher in lowest position, wheels locked, appropriate side rails in place/Call bell, personal items and telephone in reach/Instruct patient to call for assistance before getting out of bed/chair/stretcher/Non-slip footwear applied when patient is off stretcher/[place] to call system/Physically safe environment - no spills, clutter or unnecessary equipment/Purposeful proactive rounding/Room/bathroom lighting operational, light cord in reach

## 2024-09-05 NOTE — ED PROVIDER NOTE - ATTENDING CONTRIBUTION TO CARE
Beto Watts DO:  patient seen and evaluated with the resident.  I was present for key portions of the History & Physical, and I agree with the Impression & Plan. 74 yo f pmh  breast CA, hyperlipidemia, presenting for left breast hematoma drainage. Patient recently had procedure of left breast.  Has been following up with breast surgery.  Told to come here for likely evacuation of left-sided hematoma.  Patient has no acute complaints.  Denies N/C, F/C, cough, congestion.  Surgery outpatient, plan for admission.

## 2024-09-05 NOTE — ED PROVIDER NOTE - NSICDXPASTSURGICALHX_GEN_ALL_CORE_FT
PAST SURGICAL HISTORY:  H/O breast biopsy     H/O total knee replacement, left     History of bone marrow biopsy     History of dental surgery     History of hip surgery     S/P  section     S/P tympanoplasty

## 2024-09-05 NOTE — ED ADULT TRIAGE NOTE - CHIEF COMPLAINT QUOTE
Patient reports that she had a growth on her breast that was removed two weeks ago and was told she is bleeding internally from the site. Patient was sent to ED by MD Reyes for surgical removal of blood in left breast. Patient denies any CP or SOB, respirations equal and unlabored on room air. pmhx: takes hydroxyurea for an increased platelet count, breast cancer (no chemo or radiation)

## 2024-09-05 NOTE — ASSESSMENT
[FreeTextEntry1] : JACOB GRAY is a 74 y/o jak2 + F w/ Left cT1N0 (G1) IDC +/+/- > pT1b Nx s/p L PM on 8/21/24 course c/b hematoma. Here for wound check.   Left breast hematoma is not improving despite hematoma evacuation and compression.   She was instructed to present to [facility] ED with plan for Left breast hematoma evacuation and washout.    To see Medical Oncology & Radiation Oncology for adjuvant treatment recommendations.  Next imaging: BL Dx MG/US due 6/2025.

## 2024-09-05 NOTE — ED ADULT NURSE NOTE - OBJECTIVE STATEMENT
Received patient in TR-B sent by MD for left breast hematoma drainage. PMHX Breast Cancer, Hyperlipidemia. Patient denies fever, chills, SOB, chest pain. Patient is A&Ox4, airway patent, breathing unlabored and even, radial pulses palpable, abdomen soft, nontender. Labs obtained, 20G IV placed on right arm, awaiting X-ray. Side rails up and safety maintained. Call bells within reach. Family at the bedside.

## 2024-09-05 NOTE — H&P ADULT - NSHPPHYSICALEXAM_GEN_ALL_CORE
Gen: NAD  Pulm: Symmetric chest rise bilaterally, on room air  Chest: L breast swollen with overlying ecchymosis extending to the left abdomen, firm in the inferolateral aspect  Cards: Regular rate, normotensive  GI: Abdomen soft, non-tender, non-distended  Extremities: Warm, well perfused  Neuro: A&O x3, motor and sensory grossly intact

## 2024-09-05 NOTE — ASSESSMENT
[FreeTextEntry1] : JACOB GRAY is a 72 y/o jak2 + F w/ Left cT1N0 (G1) IDC +/+/- > pT1b Nx s/p L PM on 8/21/24 course c/b hematoma. Here for wound check.   Left breast hematoma is not improving despite hematoma evacuation and compression.   She was instructed to present to [facility] ED with plan for Left breast hematoma evacuation and washout.    To see Medical Oncology & Radiation Oncology for adjuvant treatment recommendations.  Next imaging: BL Dx MG/US due 6/2025.

## 2024-09-05 NOTE — ED PROVIDER NOTE - OBJECTIVE STATEMENT
73-year-old female, medical history significant for breast CA, hyperlipidemia, presenting for left breast hematoma drainage.  The patient was seen by Dr. Reyes outpatient and was told to present to the hospital for further management.  The patient reports that on August 21 she had a mass removed from her left breast that was concerning for CA.  2 days ago the patient went to the office and followed up and was told that she had a hematoma of the breast.  The patient reports that she had drainage done at this time.  The patient returned today and was told that she needed to come to the hospital because there was blood and she was bleeding internally.  The patient denies any pain of the breast at rest but states when she touches it she does have pain.  The patient reports that she is taking Tylenol and this has helped her with her symptoms.  The patient states that she does not have any chest pain, shortness of breath, headaches or visual changes but does feel lightheaded.  VSS.  PE.  Patient with extensive bruising of the left breast.  Bruising is extending from the inferior breast down to the chest wall.  Bruising sparing the axilla. Patient with no other complaints or concerns at this time, will consult surgery, will obtain pre-operative labs, final disposition likely admission for management of breast hematoma. see mdm.

## 2024-09-05 NOTE — PHYSICAL EXAM
[Normal] : supple, no neck mass and thyroid not enlarged [Normal] : oriented to person, place and time, with appropriate affect [de-identified] : Large left breast hematoma with ecchymosis along central and lower lateral left breast.  incision is c/d/i [de-identified] : Normal respiratory effort

## 2024-09-05 NOTE — H&P ADULT - ATTENDING COMMENTS
Large left breast hematoma is resistant to conservative management with compression.   Case discussed with patient's hematologist.  Labs reviewed.  Coags normal.    Will proceed with left breast hematoma evacuation and washout.    Hold hydroxyurea after surgery x 3 days.

## 2024-09-05 NOTE — H&P ADULT - ASSESSMENT
Assessment: 73yr F with past medical history of HTN, HLD, SHERIF not on CPAP, essential thrombocytosis (on hydroxyurea), Anxiety, Depression, Osteoporosis and past surgical history of left breast lumpectomy (8/21/24 with Dr. Reyes) presenting to the ED with L breast swelling and bruising concerning for hematoma.    Plan:  - Admit to surgery under Dr. Reyes  - Added on to the OR for left breast hematoma evacuation and wound exploration  - Needs consent  - NPO/ IVF  - Hematology consulted for chris-op recommendations given history of essential thrombocytosis  - ASA and DVT ppx held at this time given hematoma    Discussed with Dr. Reyes  E Team Surgery  [id number]

## 2024-09-06 ENCOUNTER — TRANSCRIPTION ENCOUNTER (OUTPATIENT)
Age: 74
End: 2024-09-06

## 2024-09-06 VITALS
OXYGEN SATURATION: 98 % | RESPIRATION RATE: 16 BRPM | SYSTOLIC BLOOD PRESSURE: 188 MMHG | TEMPERATURE: 99 F | HEART RATE: 81 BPM | DIASTOLIC BLOOD PRESSURE: 69 MMHG

## 2024-09-06 LAB
ANION GAP SERPL CALC-SCNC: 11 MMOL/L — SIGNIFICANT CHANGE UP (ref 7–14)
BUN SERPL-MCNC: 8 MG/DL — SIGNIFICANT CHANGE UP (ref 7–23)
CALCIUM SERPL-MCNC: 9.4 MG/DL — SIGNIFICANT CHANGE UP (ref 8.4–10.5)
CHLORIDE SERPL-SCNC: 103 MMOL/L — SIGNIFICANT CHANGE UP (ref 98–107)
CO2 SERPL-SCNC: 23 MMOL/L — SIGNIFICANT CHANGE UP (ref 22–31)
CREAT SERPL-MCNC: 0.51 MG/DL — SIGNIFICANT CHANGE UP (ref 0.5–1.3)
EGFR: 97 ML/MIN/1.73M2 — SIGNIFICANT CHANGE UP
GLUCOSE SERPL-MCNC: 125 MG/DL — HIGH (ref 70–99)
HCT VFR BLD CALC: 35.4 % — SIGNIFICANT CHANGE UP (ref 34.5–45)
HGB BLD-MCNC: 12.1 G/DL — SIGNIFICANT CHANGE UP (ref 11.5–15.5)
MAGNESIUM SERPL-MCNC: 2.3 MG/DL — SIGNIFICANT CHANGE UP (ref 1.6–2.6)
MCHC RBC-ENTMCNC: 34.2 GM/DL — SIGNIFICANT CHANGE UP (ref 32–36)
MCHC RBC-ENTMCNC: 37 PG — HIGH (ref 27–34)
MCV RBC AUTO: 108.3 FL — HIGH (ref 80–100)
NRBC # BLD: 0 /100 WBCS — SIGNIFICANT CHANGE UP (ref 0–0)
NRBC # FLD: 0 K/UL — SIGNIFICANT CHANGE UP (ref 0–0)
PHOSPHATE SERPL-MCNC: 2.3 MG/DL — LOW (ref 2.5–4.5)
PLATELET # BLD AUTO: 343 K/UL — SIGNIFICANT CHANGE UP (ref 150–400)
POTASSIUM SERPL-MCNC: 4.2 MMOL/L — SIGNIFICANT CHANGE UP (ref 3.5–5.3)
POTASSIUM SERPL-SCNC: 4.2 MMOL/L — SIGNIFICANT CHANGE UP (ref 3.5–5.3)
RBC # BLD: 3.27 M/UL — LOW (ref 3.8–5.2)
RBC # FLD: 13.1 % — SIGNIFICANT CHANGE UP (ref 10.3–14.5)
SODIUM SERPL-SCNC: 137 MMOL/L — SIGNIFICANT CHANGE UP (ref 135–145)
WBC # BLD: 10.92 K/UL — HIGH (ref 3.8–10.5)
WBC # FLD AUTO: 10.92 K/UL — HIGH (ref 3.8–10.5)

## 2024-09-06 RX ORDER — ACETAMINOPHEN 325 MG/1
2 TABLET ORAL
Qty: 0 | Refills: 0 | DISCHARGE
Start: 2024-09-06

## 2024-09-06 RX ORDER — OXYCODONE HYDROCHLORIDE 5 MG/1
5 TABLET ORAL EVERY 4 HOURS
Refills: 0 | Status: DISCONTINUED | OUTPATIENT
Start: 2024-09-06 | End: 2024-09-06

## 2024-09-06 RX ORDER — SODIUM CHLORIDE 9 MG/ML
3 INJECTION INTRAMUSCULAR; INTRAVENOUS; SUBCUTANEOUS EVERY 8 HOURS
Refills: 0 | Status: DISCONTINUED | OUTPATIENT
Start: 2024-09-06 | End: 2024-09-06

## 2024-09-06 RX ORDER — OXYCODONE HYDROCHLORIDE 5 MG/1
1 TABLET ORAL
Qty: 12 | Refills: 0
Start: 2024-09-06 | End: 2024-09-08

## 2024-09-06 RX ORDER — OXYCODONE HYDROCHLORIDE 5 MG/1
2.5 TABLET ORAL EVERY 4 HOURS
Refills: 0 | Status: DISCONTINUED | OUTPATIENT
Start: 2024-09-06 | End: 2024-09-06

## 2024-09-06 RX ORDER — LOSARTAN POTASSIUM 50 MG/1
25 TABLET ORAL DAILY
Refills: 0 | Status: DISCONTINUED | OUTPATIENT
Start: 2024-09-06 | End: 2024-09-06

## 2024-09-06 RX ORDER — SODIUM PHOSPHATE, DIBASIC, ANHYDROUS, POTASSIUM PHOSPHATE, MONOBASIC, AND SODIUM PHOSPHATE, MONOBASIC, MONOHYDRATE 852; 155; 130 MG/1; MG/1; MG/1
2 TABLET, COATED ORAL ONCE
Refills: 0 | Status: COMPLETED | OUTPATIENT
Start: 2024-09-06 | End: 2024-09-06

## 2024-09-06 RX ORDER — ACETAMINOPHEN 325 MG/1
650 TABLET ORAL EVERY 6 HOURS
Refills: 0 | Status: DISCONTINUED | OUTPATIENT
Start: 2024-09-06 | End: 2024-09-06

## 2024-09-06 RX ORDER — ROSUVASTATIN CALCIUM 10 MG/1
5 TABLET ORAL AT BEDTIME
Refills: 0 | Status: DISCONTINUED | OUTPATIENT
Start: 2024-09-06 | End: 2024-09-06

## 2024-09-06 RX ORDER — SERTRALINE HYDROCHLORIDE 50 MG/1
50 TABLET, FILM COATED ORAL DAILY
Refills: 0 | Status: DISCONTINUED | OUTPATIENT
Start: 2024-09-06 | End: 2024-09-06

## 2024-09-06 RX ORDER — CEPHALEXIN 500 MG
1 CAPSULE ORAL
Qty: 8 | Refills: 0
Start: 2024-09-06 | End: 2024-09-09

## 2024-09-06 RX ADMIN — CEFAZOLIN SODIUM 100 MILLIGRAM(S): 2 INJECTION, SOLUTION INTRAVENOUS at 05:24

## 2024-09-06 RX ADMIN — SERTRALINE HYDROCHLORIDE 50 MILLIGRAM(S): 50 TABLET, FILM COATED ORAL at 11:41

## 2024-09-06 RX ADMIN — OXYCODONE HYDROCHLORIDE 5 MILLIGRAM(S): 5 TABLET ORAL at 00:22

## 2024-09-06 RX ADMIN — OXYCODONE HYDROCHLORIDE 5 MILLIGRAM(S): 5 TABLET ORAL at 01:22

## 2024-09-06 RX ADMIN — SODIUM CHLORIDE 3 MILLILITER(S): 9 INJECTION INTRAMUSCULAR; INTRAVENOUS; SUBCUTANEOUS at 05:24

## 2024-09-06 RX ADMIN — SODIUM PHOSPHATE, DIBASIC, ANHYDROUS, POTASSIUM PHOSPHATE, MONOBASIC, AND SODIUM PHOSPHATE, MONOBASIC, MONOHYDRATE 2 TABLET(S): 852; 155; 130 TABLET, COATED ORAL at 11:41

## 2024-09-06 NOTE — CHART NOTE - NSCHARTNOTEFT_GEN_A_CORE
Post Operative Note  Patient: JACOB GRAY 73y (1950) Female   MRN: [id number]  Location: [facility] [address]  Visit: 09-05-24 Inpatient  Date: 09-06-24 @ 02:44    Procedure: S/P Left breast hematoma evacuation and washout.      Subjective: Patient seen and examined post operatively. Reports pain at surgery site, though recently took pain medication which is alleviating the pain. Tolerating diet. Ambulating to void. Denies nausea, vomiting, fever, chills, chest pain, SOB, cough.      Objective:  Vitals: T(F): 98.5 (09-06-24 @ 01:41), Max: 98.9 (09-05-24 @ 18:28)  HR: 87 (09-06-24 @ 01:41)  BP: 102/61 (09-06-24 @ 01:41) (102/61 - 147/77)  RR: 18 (09-06-24 @ 01:41)  SpO2: 97% (09-06-24 @ 01:41)  Vent Settings:     In:   09-05-24 @ 07:01  -  09-06-24 @ 02:44  --------------------------------------------------------  IN: 950 mL      IV Fluids: sodium chloride 0.9% lock flush 3 milliLiter(s) IV Push every 8 hours      Out:   09-05-24 @ 07:01  -  09-06-24 @ 02:44  --------------------------------------------------------  OUT: 800 mL      EBL:     Voided Urine:   09-05-24 @ 07:01  -  09-06-24 @ 02:44  --------------------------------------------------------  OUT: 800 mL      Physical Examination:  General: NAD, resting comfortably in bed  HEENT: Normocephalic atraumatic  Respiratory: Nonlabored respirations, normal CW expansion.  Breast: L breast dressed with steri strips. Chest dressed with gauze, abd pad, kerlix, and ace bandage wrap c/d/i. Appropriately TTP w/o swelling, erythema, fluctuance.  Cardio: No peripheral edema  Abdomen: soft, nondistended, nontender  Vascular: extremities are warm and well perfused.     Imaging:  No post-op imaging studies    Assessment:   73yr F with past medical history of HTN, HLD, SHERIF not on CPAP, essential thrombocytosis (on hydroxyurea), Anxiety, Depression, Osteoporosis and past surgical history of left breast lumpectomy (8/21/24 with Dr. Reyes) presenting to the ED with L breast swelling and bruising. Patient states shes had persistent bruising, fullness, and pain of the left breast since surgery, found to have a hematoma. Patient is s/p attempted percutaneous drainage outpatient however unable to be adequately drained and sent into the ED now POD0 left breast hematoma evacuation and washout. Progressing well post-operatively.    Plan:  - IV Abx: ancef  - Pain control: Tylenol, oxycodone PRN  - Diet: regular  - Activity: ambulate as tolerated  - DVT ppx: SCD's    Surgery D Team  #[id number]

## 2024-09-06 NOTE — DISCHARGE NOTE PROVIDER - CARE PROVIDER_API CALL
Reyes, Sylvia Alicia  Surgical Oncology  [address], [place] [address]  Phone: [phone]  Fax: [phone]  Follow Up Time: 2 weeks   Reyes, Sylvia Alicia  Surgical Oncology  [address], [place] [address]  Phone: [phone]  Fax: [phone]  Follow Up Time: 1 week

## 2024-09-06 NOTE — DISCHARGE NOTE PROVIDER - NSDCCPCAREPLAN_GEN_ALL_CORE_FT
PRINCIPAL DISCHARGE DIAGNOSIS  Diagnosis: Hematoma of left breast  Assessment and Plan of Treatment:

## 2024-09-06 NOTE — DISCHARGE NOTE PROVIDER - NSDCMRMEDTOKEN_GEN_ALL_CORE_FT
ALPRAZolam 0.25 mg oral tablet: 1 tab(s) orally once a day (at bedtime) as needed for  anxiety  Calcium: 500mg orally once a day  Centrum Silver: 1 tab(s) orally once a day  hydroxyurea 500 mg oral capsule: 1 cap(s) orally once a day (at bedtime)  losartan 25 mg oral tablet: 1 tab(s) orally once a day  rosuvastatin 5 mg oral tablet: 1 tab(s) orally once a day  Vitamin D3: 50mcg orally once a day  Zoloft 50 mg oral tablet: 1 tab(s) orally once a day   acetaminophen 325 mg oral tablet: 2 tab(s) orally every 6 hours As needed Temp greater or equal to 38C (100.4F), Mild Pain (1 - 3)  ALPRAZolam 0.25 mg oral tablet: 1 tab(s) orally once a day (at bedtime) as needed for  anxiety  Calcium: 500mg orally once a day  Centrum Silver: 1 tab(s) orally once a day  hydroxyurea 500 mg oral capsule: 1 cap(s) orally once a day (at bedtime)  losartan 25 mg oral tablet: 1 tab(s) orally once a day  rosuvastatin 5 mg oral tablet: 1 tab(s) orally once a day  Vitamin D3: 50mcg orally once a day  Zoloft 50 mg oral tablet: 1 tab(s) orally once a day   acetaminophen 325 mg oral tablet: 2 tab(s) orally every 6 hours As needed Temp greater or equal to 38C (100.4F), Mild Pain (1 - 3)  ALPRAZolam 0.25 mg oral tablet: 1 tab(s) orally once a day (at bedtime) as needed for  anxiety  Calcium: 500mg orally once a day  Centrum Silver: 1 tab(s) orally once a day  losartan 25 mg oral tablet: 1 tab(s) orally once a day  oxyCODONE 5 mg oral tablet: 1 tab(s) orally every 6 hours as needed for  severe pain MDD: 4 tabs  rosuvastatin 5 mg oral tablet: 1 tab(s) orally once a day  Vitamin D3: 50mcg orally once a day  Zoloft 50 mg oral tablet: 1 tab(s) orally once a day   acetaminophen 325 mg oral tablet: 2 tab(s) orally every 6 hours As needed Temp greater or equal to 38C (100.4F), Mild Pain (1 - 3)  ALPRAZolam 0.25 mg oral tablet: 1 tab(s) orally once a day (at bedtime) as needed for  anxiety  Calcium: 500mg orally once a day  Centrum Silver: 1 tab(s) orally once a day  cephalexin 500 mg oral tablet: 1 tab(s) orally every 12 hours  losartan 25 mg oral tablet: 1 tab(s) orally once a day  oxyCODONE 5 mg oral tablet: 1 tab(s) orally every 6 hours as needed for  severe pain MDD: 4 tabs  rosuvastatin 5 mg oral tablet: 1 tab(s) orally once a day  Vitamin D3: 50mcg orally once a day  Zoloft 50 mg oral tablet: 1 tab(s) orally once a day

## 2024-09-06 NOTE — DISCHARGE NOTE PROVIDER - HOSPITAL COURSE
73yr F with past medical history of HTN, HLD, SHERIF not on CPAP, essential thrombocytosis (on hydroxyurea), Anxiety, Depression, Osteoporosis and past surgical history of left breast lumpectomy (8/21/24 with Dr. Reyes) presenting to the ED with L breast swelling and bruising concerning for hematoma. Patient was taken to the OR and underwent a left breast washout and hematoma evacuation. Tolerated procedure well and was transferred to surgical floor. Started on a regular diet.    At the time of discharge, the patient was hemodynamically stable, was tolerating PO diet, was voiding urine and passing stool. Patient was ambulating and was comfortable with adequate pain control.  The patient was instructed to follow up with Dr. Reyes within 1-2 weeks after discharge from the hospital.  The patient / family felt comfortable with discharge.  The patient had no other issues.    Per attending, patient deemed medically stable and ready for discharge at this time.

## 2024-09-06 NOTE — DISCHARGE NOTE PROVIDER - NSDCFUSCHEDAPPT_GEN_ALL_CORE_FT
Melina Griffin  [facility]  CARDIOLOGY [address]  Scheduled Appointment: 11/05/2024    [facility]  [facility]  Scheduled Appointment: 11/11/2024    Tanya Matthew  [facility]  [facility]  Scheduled Appointment: 11/11/2024     Reyes, Sylvia  [facility]  SURGONC [address]  Scheduled Appointment: 09/12/2024    Melina Griffin  [facility]  CARDIOLOGY [address]  Scheduled Appointment: 11/05/2024    [facility]  [facility]  Scheduled Appointment: 11/11/2024    Tanya Matthew  [facility]  [facility]  Scheduled Appointment: 11/11/2024

## 2024-09-06 NOTE — DISCHARGE NOTE PROVIDER - PROVIDER TOKENS
PROVIDER:[TOKEN:[[id number]],FOLLOWUP:[2 weeks]] PROVIDER:[TOKEN:[[id number]],FOLLOWUP:[1 week]]

## 2024-09-06 NOTE — DISCHARGE NOTE PROVIDER - NSDCCPTREATMENT_GEN_ALL_CORE_FT
PRINCIPAL PROCEDURE  Procedure: Drainage, hematoma  Findings and Treatment: WOUND CARE:  Please keep incisions clean and dry. Please do not Scrub or rub incisions. Do not use lotion or powder on incisions.   BATHING: You may shower and/or sponge bathe. You may use warm soapy water in the shower and rinse, pat dry.  ACTIVITY: No heavy lifting or straining. Otherwise, you may return to your usual level of physical activity. If you are taking narcotic pain medication DO NOT drive a car, operate machinery or make important decisions.  DIET: Return to your usual diet.  NOTIFY YOUR SURGEON IF YOU HAVE: any bleeding that does not stop, any pus draining from your wound(s), any fever (over 100.4 F) persistent nausea/vomiting, or if your pain is not controlled on your discharge pain medications, unable to urinate.  FOLLOW UP:  1. Please follow up with your primary care physician in one week regarding your hospitalization, bring copies of your discharge paperwork.  2. Please follow up with your surgeon, Dr. Reyes in 2 weeks after your discharge from the hospital. Please call [phone] to make an appointment.     PRINCIPAL PROCEDURE  Procedure: Drainage, hematoma  Findings and Treatment: MEDICATIONS: Please HOLD your home hydroxyurea for 72 hours after your discharge from the hosptial. Do not resume until September 9th.  Please complete your full course of antibiotics as prescribed.  WOUND CARE:  Please keep your dressing in place for 48 hours after your discharge from the hospital.  You may remove on Sunday September 8th.  ACTIVITY: No heavy lifting or straining. Otherwise, you may return to your usual level of physical activity. If you are taking narcotic pain medication DO NOT drive a car, operate machinery or make important decisions.  DIET: Return to your usual diet.  NOTIFY YOUR SURGEON IF YOU HAVE: any bleeding that does not stop, any pus draining from your wound(s), any fever (over 100.4 F) persistent nausea/vomiting, or if your pain is not controlled on your discharge pain medications, unable to urinate.  FOLLOW UP:  1. Please follow up with your primary care physician in one week regarding your hospitalization, bring copies of your discharge paperwork.  2. Please follow up with your surgeon, Dr. Reyes in 2 weeks after your discharge from the hospital. Please call [phone] to make an appointment.

## 2024-09-06 NOTE — PATIENT PROFILE ADULT - FALL HARM RISK - HARM RISK INTERVENTIONS
Assistance with ambulation/Assistance OOB with selected safe patient handling equipment/Communicate Risk of Fall with Harm to all staff/Discuss with provider need for PT consult/Monitor gait and stability/Provide patient with walking aids - walker, cane, crutches/Reinforce activity limits and safety measures with patient and family/Sit up slowly, dangle for a short time, stand at bedside before walking/Tailored Fall Risk Interventions/Use of alarms - bed, chair and/or voice tab/Visual Cue: Yellow wristband and red socks/Bed in lowest position, wheels locked, appropriate side rails in place/Call bell, personal items and telephone in reach/Instruct patient to call for assistance before getting out of bed or chair/Non-slip footwear when patient is out of bed/[place] to call system/Physically safe environment - no spills, clutter or unnecessary equipment/Purposeful Proactive Rounding/Room/bathroom lighting operational, light cord in reach

## 2024-09-06 NOTE — DISCHARGE NOTE NURSING/CASE MANAGEMENT/SOCIAL WORK - PATIENT PORTAL LINK FT
You can access the FollowMyHealth Patient Portal offered by [facility] by registering at the following website: [email or web address]. By joining [facility]’s FollowMyHealth portal, you will also be able to view your health information using other applications (apps) compatible with our system.

## 2024-09-06 NOTE — DISCHARGE NOTE PROVIDER - NSDCFUADDINST_GEN_ALL_CORE_FT
Please HOLD your home medication hydroxyurea for 72 hours after your discharge from the hospital. Do not resume until September 9th.    Please keep your dressing in place for 48 hours after your discharge from the hospital.

## 2024-09-06 NOTE — DISCHARGE NOTE PROVIDER - ATTENDING DISCHARGE PHYSICAL EXAMINATION:
Patient has significantly improved clinically after left breast surgical hematoma evacuation, hemostasis and compression.    The left breast ecchymosis has significant decreased.  She reports minimal pain.      The ACE wrap was removed and the left breast was inspected and shows no evidence of reaccumulation.   No sign of infection.    The ACE wrap and kerlix dressing was reapplied for additional compression and she will follow up with me as outpatient.    ~Sylvia Reyes, MD

## 2024-09-06 NOTE — PROGRESS NOTE ADULT - SUBJECTIVE AND OBJECTIVE BOX
SURGERY DAILY PROGRESS NOTE    24 Hour/Overnight Events: No acute events overnight    SUBJECTIVE: Patient seen and evaluated on AM rounds. Pt is resting comfortably in bed with no acute complaints. Pt reports appropriately controlled pain on current regimen.  Denies fevers/chills, chest pain, dyspnea, abdominal pain, nausea, vomiting, and diarrhea.       ------------------------------------------------------------------------------------------------------------  OBJECTIVE:  Vital Signs Last 24 Hrs  T(C): 36.7 (06 Sep 2024 05:24), Max: 37.2 (05 Sep 2024 18:28)  T(F): 98 (06 Sep 2024 05:24), Max: 98.9 (05 Sep 2024 18:28)  HR: 73 (06 Sep 2024 05:24) (73 - 94)  BP: 122/66 (06 Sep 2024 05:24) (102/61 - 147/77)  BP(mean): 86 (05 Sep 2024 22:30) (82 - 87)  RR: 16 (06 Sep 2024 05:24) (13 - 20)  SpO2: 97% (06 Sep 2024 05:24) (95% - 100%)    Parameters below as of 06 Sep 2024 05:24  Patient On (Oxygen Delivery Method): room air      I&O's Detail    05 Sep 2024 07:01  -  06 Sep 2024 07:00  --------------------------------------------------------  IN:    IV PiggyBack: 100 mL    Lactated Ringers: 300 mL    Oral Fluid: 720 mL  Total IN: 1120 mL    OUT:    Voided (mL): 1400 mL  Total OUT: 1400 mL    Total NET: -280 mL          LABS:                        12.1   10.92 )-----------( 343      ( 06 Sep 2024 05:20 )             35.4     09-06    137  |  103  |  8   ----------------------------<  125<H>  4.2   |  23  |  0.51    Ca    9.4      06 Sep 2024 05:20  Phos  2.3     09-06  Mg     2.30     09-06    TPro  6.8  /  Alb  4.3  /  TBili  0.8  /  DBili  x   /  AST  16  /  ALT  13  /  AlkPhos  61  09-05    LIVER FUNCTIONS - ( 05 Sep 2024 11:20 )  Alb: 4.3 g/dL / Pro: 6.8 g/dL / ALK PHOS: 61 U/L / ALT: 13 U/L / AST: 16 U/L / GGT: x           PT/INR - ( 05 Sep 2024 11:20 )   PT: 11.1 sec;   INR: 0.99 ratio         PTT - ( 05 Sep 2024 11:20 )  PTT:31.5 sec  Urinalysis Basic - ( 06 Sep 2024 05:20 )    Color: x / Appearance: x / SG: x / pH: x  Gluc: 125 mg/dL / Ketone: x  / Bili: x / Urobili: x   Blood: x / Protein: x / Nitrite: x   Leuk Esterase: x / RBC: x / WBC x   Sq Epi: x / Non Sq Epi: x / Bacteria: x      Physical Examination:  General: NAD, resting comfortably in bed  HEENT: Normocephalic atraumatic  Respiratory: Nonlabored respirations, normal CW expansion.  Breast: L breast dressed with steri strips. Chest dressed with gauze, abd pad, kerlix, and ace bandage wrap c/d/i. Appropriately TTP w/o swelling, erythema, fluctuance.  Cardio: No peripheral edema  Abdomen: soft, nondistended, nontender  Vascular: extremities are warm and well perfused.     Imaging:  No post-op imaging studies    Assessment:   73yr F with past medical history of HTN, HLD, SHERIF not on CPAP, essential thrombocytosis (on hydroxyurea), Anxiety, Depression, Osteoporosis and past surgical history of left breast lumpectomy (8/21/24 with Dr. Reyes) presenting to the ED with L breast swelling and bruising. Patient states shes had persistent bruising, fullness, and pain of the left breast since surgery, found to have a hematoma. Patient is s/p attempted percutaneous drainage outpatient however unable to be adequately drained and sent into the ED now POD0 left breast hematoma evacuation and washout. Progressing well post-operatively.    Plan:  -Possibly home today  - IV Abx: ancef  - Pain control: Tylenol, oxycodone PRN  - Diet: regular  - Activity: ambulate as tolerated  - DVT ppx: SCD's

## 2024-09-06 NOTE — DISCHARGE NOTE NURSING/CASE MANAGEMENT/SOCIAL WORK - NSDCPNINST_GEN_ALL_CORE
Please return to ED if you develop any nausea, vomiting, diarrhea or temp of 100.4 and greater. Return to ED if you develop any pus like drainage from incision sites or increased level of pain unrelieved with ordered pain meds.

## 2024-09-06 NOTE — SBIRT NOTE ADULT - NSSBIRTALCPOSREINDET_GEN_A_CORE
SUSAN completed sbirt with patient. Patient reports she drinks one glass of wine with dinner approx. 4x per week. Patient does not identify her use of alcohol as an issue at this time. SW provided positive reinforcement provided. Patient declined need for any resources at this time. SBIRT marked complete.

## 2024-09-08 ENCOUNTER — NON-APPOINTMENT (OUTPATIENT)
Age: 74
End: 2024-09-08

## 2024-09-09 ENCOUNTER — NON-APPOINTMENT (OUTPATIENT)
Age: 74
End: 2024-09-09

## 2024-09-09 ENCOUNTER — APPOINTMENT (OUTPATIENT)
Dept: SURGICAL ONCOLOGY | Facility: CLINIC | Age: 74
End: 2024-09-09

## 2024-09-09 VITALS
SYSTOLIC BLOOD PRESSURE: 136 MMHG | DIASTOLIC BLOOD PRESSURE: 88 MMHG | OXYGEN SATURATION: 97 % | HEIGHT: 64 IN | HEART RATE: 92 BPM | BODY MASS INDEX: 25.61 KG/M2 | RESPIRATION RATE: 17 BRPM | WEIGHT: 150 LBS

## 2024-09-09 PROCEDURE — 99024 POSTOP FOLLOW-UP VISIT: CPT

## 2024-09-09 NOTE — ASSESSMENT
[FreeTextEntry1] : JACOB GRAY is a 72 y/o jak2 + F w/ Left cT1N0 (G1) IDC +/+/- > pT1b Nx s/p L PM on 8/21/24 course c/b hematoma s/p evacuation, washout on 9/5/24.  Here for post-op check.   150 ccs of fluid were directly aspirated from the left breast seroma under ultrasound guidance using sterile technique. Additional seroma serous fluid was manually expressed from left inframammary incision. Post-procedure ultrasound confirmed resolution of seroma. Pressure dressing with ace wrap was placed.  - Instructed to restart home hydroxyurea tomorrow evening and remove ACE compressive dressing Wednesday night. - To see Medical Oncology & Radiation Oncology for adjuvant treatment recommendations.  - Next imaging: BL Dx MG/US due 6/2025.  - RTO on 9/12/2024.

## 2024-09-09 NOTE — ASSESSMENT
[FreeTextEntry1] : JACOB GRAY is a 74 y/o jak2 + F w/ Left cT1N0 (G1) IDC +/+/- > pT1b Nx s/p L PM on 8/21/24 course c/b hematoma s/p evacuation, washout on 9/5/24.  Here for post-op check.   150 ccs of fluid were directly aspirated from the left breast seroma under ultrasound guidance using sterile technique. Additional seroma serous fluid was manually expressed from left inframammary incision. Post-procedure ultrasound confirmed resolution of seroma. Pressure dressing with ace wrap was placed.  - Instructed to restart home hydroxyurea tomorrow evening and remove ACE compressive dressing Wednesday night. - To see Medical Oncology & Radiation Oncology for adjuvant treatment recommendations.  - Next imaging: BL Dx MG/US due 6/2025.  - RTO on 9/12/2024.

## 2024-09-09 NOTE — RESULTS/DATA
[FreeTextEntry1] : BREAST PATH/RAD REVIEW.  [facility].  9/9/20 BL SC MG: BIRADs 2. SFGD. Stable L central nodular asymmetry.  9/10/21 BL SC MG: BIRADs 2. SFGD. Scattered benign calcs. R UOQ IM LN. Stable L central nodular asymmetry.  6/21/23 BL SC MG: BIRADS 1. SFGD. Punctate benign calcs.  6/24/24 BL SC MG: BIRADs 0. SFGD. BL benign appearing calcs. L UOQ lobulated mass, mid to posterior depth possibly representing IM LN. (Rec L Dx US).  7/2/24 L Dx US: BIRADs 4B. L 12N6 subtle 0.4 cm mixed echogenicity mass w/ posterior shadowing likely corresponding to mass on MG. (Rec US guided bx) 7/10/24 Left [12N6] US guided core bx: IDC (G1). LVP not seen. ER 95%, CA 95%, HER2 (0) Negative. Ribbon Clip. Concordant. (Rec Surg c/s) Post-bx MG: Ribbon clip seen in the L upper central region w/ overlying mammographically seen focal asymmetry   8/21/24 s/p L cisco loc PM. pT1b Nx.  1. Left PM: IDC (G1) to be 7 mm, negative margins for invasive carcinoma. DCIS (G2) focally <1mm grom posterior margin. Additional ADH & FEA. Micro-calcs associated w/ invasive carcinoma and benign epithelium.  9/5/24 s/p L hematoma evacuation and washout.

## 2024-09-09 NOTE — HISTORY OF PRESENT ILLNESS
[de-identified] : JACOB GRAY is a 74 y/o jak2 + F w/ Left cT1N0 (G1) IDC +/+/- > pT1b Nx s/p L PM on 24 course c/b hematoma s/p evacuation, washout on 24.  Here for post-op check.  Hematologist: Dr. Tanya Matthew.   24 (Initial visit): She denies any palpable breast masses, nipple discharge or skin changes.  Findings were discovered on routine breast screening (see below). Reports no systemic symptoms.  24 s/p L PM - IDC 7 mm, negative margins for invasive CA. DCIS (G2) focally <1mm from posterior margin-> No further surgery is necessary.  The final lumpectomy margin was taken down to muscle.  9/3/24 POD#13 Reports noticing black and blue POD1 over the entire left breast, since then states the bruising has been improving and now area has a yellow-moreno tinge. Denies any breast pain, fever or chills. After surgery resumed her hydroxyurea 500 mg and held off on restarting her baby aspirin.  24 RTOR for Left hematoma evaluation  24 POD# 4. Denies any fevers or chills. Denies any breast pain. Steristrips in place over left inframammary incision. Overall left breast ecchymosis is vastly improved. There is continued left central breast swelling.   PMHx: Right hip arthritis, vertebral disc disease, jak2 + essential thrombocytosis diagnosed in  controlled on hydroxyurea 500 mg, Vitamin D deficiency, HTN, HLD, anxiety. Deviated septum, Hard of hearing (L>R) uses hearing aids PSHx: L TKR, Left hip repair  with pins in place,  x 2.  Meds: Alprazolam 0.25 mg qHS PRN, ASA 81 mg. Hydroxyurea 500 mg QD, Calcium 500 mg, famotidine 20 mg, Losartan 25 mg, Multivitamin. Rosuvastatin 5 mg, sertraline 50 mg QD, Vitamin D3. Magnesium.  Allergies: Zithromax- Hives.  Family Hx: No family hx of breast or ovarian cancer.  GYN: , menarche age 13, Menopause at age 50-55. Age at first pregnancy 39.  Y (3 months)  Hx of OCPs for 20 years from age [age] Bra size: 38 B  Occupation: Retired.  Social: Former Smoker for 10 years, quit    ETOH: Drinks wine, typically 7 times a week.

## 2024-09-09 NOTE — HISTORY OF PRESENT ILLNESS
[de-identified] : JACOB GRAY is a 74 y/o jak2 + F w/ Left cT1N0 (G1) IDC +/+/- > pT1b Nx s/p L PM on 24 course c/b hematoma s/p evacuation, washout on 24.  Here for post-op check.  Hematologist: Dr. Tanya Matthew.   24 (Initial visit): She denies any palpable breast masses, nipple discharge or skin changes.  Findings were discovered on routine breast screening (see below). Reports no systemic symptoms.  24 s/p L PM - IDC 7 mm, negative margins for invasive CA. DCIS (G2) focally <1mm from posterior margin-> No further surgery is necessary.  The final lumpectomy margin was taken down to muscle.  9/3/24 POD#13 Reports noticing black and blue POD1 over the entire left breast, since then states the bruising has been improving and now area has a yellow-moreno tinge. Denies any breast pain, fever or chills. After surgery resumed her hydroxyurea 500 mg and held off on restarting her baby aspirin.  24 RTOR for Left hematoma evaluation  24 POD# 4. Denies any fevers or chills. Denies any breast pain. Steristrips in place over left inframammary incision. Overall left breast ecchymosis is vastly improved. There is continued left central breast swelling.   PMHx: Right hip arthritis, vertebral disc disease, jak2 + essential thrombocytosis diagnosed in  controlled on hydroxyurea 500 mg, Vitamin D deficiency, HTN, HLD, anxiety. Deviated septum, Hard of hearing (L>R) uses hearing aids PSHx: L TKR, Left hip repair  with pins in place,  x 2.  Meds: Alprazolam 0.25 mg qHS PRN, ASA 81 mg. Hydroxyurea 500 mg QD, Calcium 500 mg, famotidine 20 mg, Losartan 25 mg, Multivitamin. Rosuvastatin 5 mg, sertraline 50 mg QD, Vitamin D3. Magnesium.  Allergies: Zithromax- Hives.  Family Hx: No family hx of breast or ovarian cancer.  GYN: , menarche age 13, Menopause at age 50-55. Age at first pregnancy 39.  Y (3 months)  Hx of OCPs for 20 years from age [age] Bra size: 38 B  Occupation: Retired.  Social: Former Smoker for 10 years, quit    ETOH: Drinks wine, typically 7 times a week.

## 2024-09-09 NOTE — RESULTS/DATA
[FreeTextEntry1] : BREAST PATH/RAD REVIEW.  [facility].  9/9/20 BL SC MG: BIRADs 2. SFGD. Stable L central nodular asymmetry.  9/10/21 BL SC MG: BIRADs 2. SFGD. Scattered benign calcs. R UOQ IM LN. Stable L central nodular asymmetry.  6/21/23 BL SC MG: BIRADS 1. SFGD. Punctate benign calcs.  6/24/24 BL SC MG: BIRADs 0. SFGD. BL benign appearing calcs. L UOQ lobulated mass, mid to posterior depth possibly representing IM LN. (Rec L Dx US).  7/2/24 L Dx US: BIRADs 4B. L 12N6 subtle 0.4 cm mixed echogenicity mass w/ posterior shadowing likely corresponding to mass on MG. (Rec US guided bx) 7/10/24 Left [12N6] US guided core bx: IDC (G1). LVP not seen. ER 95%, AK 95%, HER2 (0) Negative. Ribbon Clip. Concordant. (Rec Surg c/s) Post-bx MG: Ribbon clip seen in the L upper central region w/ overlying mammographically seen focal asymmetry   8/21/24 s/p L cisco loc PM. pT1b Nx.  1. Left PM: IDC (G1) to be 7 mm, negative margins for invasive carcinoma. DCIS (G2) focally <1mm grom posterior margin. Additional ADH & FEA. Micro-calcs associated w/ invasive carcinoma and benign epithelium.  9/5/24 s/p L hematoma evacuation and washout.

## 2024-09-11 ENCOUNTER — NON-APPOINTMENT (OUTPATIENT)
Age: 74
End: 2024-09-11

## 2024-09-12 ENCOUNTER — APPOINTMENT (OUTPATIENT)
Dept: SURGICAL ONCOLOGY | Facility: CLINIC | Age: 74
End: 2024-09-12

## 2024-09-12 VITALS
SYSTOLIC BLOOD PRESSURE: 131 MMHG | HEART RATE: 85 BPM | DIASTOLIC BLOOD PRESSURE: 81 MMHG | RESPIRATION RATE: 17 BRPM | OXYGEN SATURATION: 97 % | BODY MASS INDEX: 25.61 KG/M2 | HEIGHT: 64 IN | WEIGHT: 150 LBS

## 2024-09-12 PROCEDURE — 99024 POSTOP FOLLOW-UP VISIT: CPT

## 2024-09-12 NOTE — HISTORY OF PRESENT ILLNESS
[de-identified] : JACOB GRAY is a 72 y/o jak2 + F w/ Left cT1N0 (G1) IDC +/+/- > pT1b Nx s/p L PM on 24 course c/b hematoma s/p evacuation, washout on 24.  Here for follow up.  Hematologist: Dr. Tanya Matthew.   24 (Initial visit): She denies any palpable breast masses, nipple discharge or skin changes.  Findings were discovered on routine breast screening (see below). Reports no systemic symptoms.  24 s/p L PM - IDC 7 mm, negative margins for invasive CA. DCIS (G2) focally <1mm from posterior margin-> No further surgery is necessary.  The final lumpectomy margin was taken down to muscle.  9/3/24 POD#13 Reports noticing black and blue POD1 over the entire left breast, since then states the bruising has been improving and now area has a yellow-moreno tinge. Denies any breast pain, fever or chills. After surgery resumed her hydroxyurea 500 mg and held off on restarting her baby aspirin.  24 RTOR for Left hematoma evaluation  24 POD# 4. Denies any fevers or chills. Denies any breast pain. Steristrips in place over left inframammary incision. Overall left breast ecchymosis is vastly improved. There is continued left central breast swelling s/p needle aspiration w/ 150 ccs of fluid removed. 24. Restarted home hydroxyurea on 9/10/24. Removed ACE compression dressing yesterday evening. Denies fevers, chills or pain. States that breast appears much softer than before.   PMHx: Right hip arthritis, vertebral disc disease, jak2 + essential thrombocytosis diagnosed in  controlled on hydroxyurea 500 mg, Vitamin D deficiency, HTN, HLD, anxiety. Deviated septum, Hard of hearing (L>R) uses hearing aids PSHx: L TKR, Left hip repair  with pins in place,  x 2.  Meds: Alprazolam 0.25 mg qHS PRN, ASA 81 mg. Hydroxyurea 500 mg QD, Calcium 500 mg, famotidine 20 mg, Losartan 25 mg, Multivitamin. Rosuvastatin 5 mg, sertraline 50 mg QD, Vitamin D3. Magnesium.  Allergies: Zithromax- Hives.  Family Hx: No family hx of breast or ovarian cancer.  GYN: , menarche age 13, Menopause at age 50-55. Age at first pregnancy 39.  Y (3 months)  Hx of OCPs for 20 years from age [age] Bra size: 38 B  Occupation: Retired.  Social: Former Smoker for 10 years, quit    ETOH: Drinks wine, typically 7 times a week.

## 2024-09-12 NOTE — ASSESSMENT
[FreeTextEntry1] : JACOB GRAY is a 72 y/o jak2 + F w/ Left cT1N0 (G1) IDC +/+/- > pT1b Nx s/p L PM on 8/21/24 course c/b hematoma s/p evacuation, washout on 9/5/24.  Here for follow up.   Left breast with vast improvement in swelling and resolving ecchymosis. Left inframammary incision closed without signs of infection.   Patient plans to travel to [place] on 9/25/24 and will return on 10/1/24. We will see her in the office prior to planned vacation for wound check.   - To see Medical Oncology & Radiation Oncology for adjuvant treatment recommendations.  - Next imaging: BL Dx MG/US due 6/2025.  - RTO on 9/19/2024 for wound check.

## 2024-09-12 NOTE — ASSESSMENT
[FreeTextEntry1] : JACOB GRAY is a 74 y/o jak2 + F w/ Left cT1N0 (G1) IDC +/+/- > pT1b Nx s/p L PM on 8/21/24 course c/b hematoma s/p evacuation, washout on 9/5/24.  Here for follow up.   Left breast with vast improvement in swelling and resolving ecchymosis. Left inframammary incision closed without signs of infection.   Patient plans to travel to [place] on 9/25/24 and will return on 10/1/24. We will see her in the office prior to planned vacation for wound check.   - To see Medical Oncology & Radiation Oncology for adjuvant treatment recommendations.  - Next imaging: BL Dx MG/US due 6/2025.  - RTO on 9/19/2024 for wound check.

## 2024-09-12 NOTE — RESULTS/DATA
[FreeTextEntry1] : BREAST PATH/RAD REVIEW.  [facility].  9/9/20 BL SC MG: BIRADs 2. SFGD. Stable L central nodular asymmetry.  9/10/21 BL SC MG: BIRADs 2. SFGD. Scattered benign calcs. R UOQ IM LN. Stable L central nodular asymmetry.  6/21/23 BL SC MG: BIRADS 1. SFGD. Punctate benign calcs.  6/24/24 BL SC MG: BIRADs 0. SFGD. BL benign appearing calcs. L UOQ lobulated mass, mid to posterior depth possibly representing IM LN. (Rec L Dx US).  7/2/24 L Dx US: BIRADs 4B. L 12N6 subtle 0.4 cm mixed echogenicity mass w/ posterior shadowing likely corresponding to mass on MG. (Rec US guided bx) 7/10/24 Left [12N6] US guided core bx: IDC (G1). LVP not seen. ER 95%, OR 95%, HER2 (0) Negative. Ribbon Clip. Concordant. (Rec Surg c/s) Post-bx MG: Ribbon clip seen in the L upper central region w/ overlying mammographically seen focal asymmetry   8/21/24 s/p L cisco loc PM. pT1b Nx.  1. Left PM: IDC (G1) to be 7 mm, negative margins for invasive carcinoma. DCIS (G2) focally <1mm from posterior margin. Additional ADH & FEA. Micro-calcs associated w/ invasive carcinoma and benign epithelium.  9/5/24 s/p L hematoma evacuation and washout.

## 2024-09-12 NOTE — HISTORY OF PRESENT ILLNESS
[de-identified] : JACOB GRAY is a 72 y/o jak2 + F w/ Left cT1N0 (G1) IDC +/+/- > pT1b Nx s/p L PM on 24 course c/b hematoma s/p evacuation, washout on 24.  Here for follow up.  Hematologist: Dr. Tanya Matthew.   24 (Initial visit): She denies any palpable breast masses, nipple discharge or skin changes.  Findings were discovered on routine breast screening (see below). Reports no systemic symptoms.  24 s/p L PM - IDC 7 mm, negative margins for invasive CA. DCIS (G2) focally <1mm from posterior margin-> No further surgery is necessary.  The final lumpectomy margin was taken down to muscle.  9/3/24 POD#13 Reports noticing black and blue POD1 over the entire left breast, since then states the bruising has been improving and now area has a yellow-moreno tinge. Denies any breast pain, fever or chills. After surgery resumed her hydroxyurea 500 mg and held off on restarting her baby aspirin.  24 RTOR for Left hematoma evaluation  24 POD# 4. Denies any fevers or chills. Denies any breast pain. Steristrips in place over left inframammary incision. Overall left breast ecchymosis is vastly improved. There is continued left central breast swelling s/p needle aspiration w/ 150 ccs of fluid removed. 24. Restarted home hydroxyurea on 9/10/24. Removed ACE compression dressing yesterday evening. Denies fevers, chills or pain. States that breast appears much softer than before.   PMHx: Right hip arthritis, vertebral disc disease, jak2 + essential thrombocytosis diagnosed in  controlled on hydroxyurea 500 mg, Vitamin D deficiency, HTN, HLD, anxiety. Deviated septum, Hard of hearing (L>R) uses hearing aids PSHx: L TKR, Left hip repair  with pins in place,  x 2.  Meds: Alprazolam 0.25 mg qHS PRN, ASA 81 mg. Hydroxyurea 500 mg QD, Calcium 500 mg, famotidine 20 mg, Losartan 25 mg, Multivitamin. Rosuvastatin 5 mg, sertraline 50 mg QD, Vitamin D3. Magnesium.  Allergies: Zithromax- Hives.  Family Hx: No family hx of breast or ovarian cancer.  GYN: , menarche age 13, Menopause at age 50-55. Age at first pregnancy 39.  Y (3 months)  Hx of OCPs for 20 years from age [age] Bra size: 38 B  Occupation: Retired.  Social: Former Smoker for 10 years, quit    ETOH: Drinks wine, typically 7 times a week.

## 2024-09-12 NOTE — RESULTS/DATA
[FreeTextEntry1] : BREAST PATH/RAD REVIEW.  [facility].  9/9/20 BL SC MG: BIRADs 2. SFGD. Stable L central nodular asymmetry.  9/10/21 BL SC MG: BIRADs 2. SFGD. Scattered benign calcs. R UOQ IM LN. Stable L central nodular asymmetry.  6/21/23 BL SC MG: BIRADS 1. SFGD. Punctate benign calcs.  6/24/24 BL SC MG: BIRADs 0. SFGD. BL benign appearing calcs. L UOQ lobulated mass, mid to posterior depth possibly representing IM LN. (Rec L Dx US).  7/2/24 L Dx US: BIRADs 4B. L 12N6 subtle 0.4 cm mixed echogenicity mass w/ posterior shadowing likely corresponding to mass on MG. (Rec US guided bx) 7/10/24 Left [12N6] US guided core bx: IDC (G1). LVP not seen. ER 95%, ID 95%, HER2 (0) Negative. Ribbon Clip. Concordant. (Rec Surg c/s) Post-bx MG: Ribbon clip seen in the L upper central region w/ overlying mammographically seen focal asymmetry   8/21/24 s/p L cisco loc PM. pT1b Nx.  1. Left PM: IDC (G1) to be 7 mm, negative margins for invasive carcinoma. DCIS (G2) focally <1mm from posterior margin. Additional ADH & FEA. Micro-calcs associated w/ invasive carcinoma and benign epithelium.  9/5/24 s/p L hematoma evacuation and washout.

## 2024-09-13 ENCOUNTER — NON-APPOINTMENT (OUTPATIENT)
Age: 74
End: 2024-09-13

## 2024-09-18 ENCOUNTER — APPOINTMENT (OUTPATIENT)
Dept: SURGICAL ONCOLOGY | Facility: CLINIC | Age: 74
End: 2024-09-18

## 2024-09-18 VITALS
WEIGHT: 150 LBS | HEIGHT: 64 IN | BODY MASS INDEX: 25.61 KG/M2 | OXYGEN SATURATION: 99 % | DIASTOLIC BLOOD PRESSURE: 72 MMHG | RESPIRATION RATE: 16 BRPM | SYSTOLIC BLOOD PRESSURE: 122 MMHG | HEART RATE: 76 BPM

## 2024-09-18 DIAGNOSIS — N61.0 MASTITIS WITHOUT ABSCESS: ICD-10-CM

## 2024-09-18 PROCEDURE — 99024 POSTOP FOLLOW-UP VISIT: CPT

## 2024-09-18 RX ORDER — SULFAMETHOXAZOLE AND TRIMETHOPRIM 800; 160 MG/1; MG/1
800-160 TABLET ORAL TWICE DAILY
Qty: 10 | Refills: 0 | Status: ACTIVE | COMMUNITY
Start: 2024-09-18 | End: 1900-01-01

## 2024-09-20 NOTE — HISTORY OF PRESENT ILLNESS
[de-identified] : JACOB GRAY is a 72 y/o jak2 + F w/ Left cT1N0 (G1) IDC +/+/- > pT1b Nx s/p L PM on 24 course c/b hematoma s/p evacuation, washout on 24.  Here for follow up.  Hematologist: Dr. Tanya Matthew.   24 (Initial visit): She denies any palpable breast masses, nipple discharge or skin changes.  Findings were discovered on routine breast screening (see below). Reports no systemic symptoms.  24 s/p L PM - IDC 7 mm, negative margins for invasive CA. DCIS (G2) focally <1mm from posterior margin-> No further surgery is necessary.  The final lumpectomy margin was taken down to muscle.  9/3/24 POD#13 Reports noticing black and blue POD1 over the entire left breast, since then states the bruising has been improving and now area has a yellow-moreno tinge. Denies any breast pain, fever or chills. After surgery resumed her hydroxyurea 500 mg and held off on restarting her baby aspirin.  24 RTOR for Left hematoma evaluation  24 POD# 4. Denies any fevers or chills. Denies any breast pain. Steristrips in place over left inframammary incision. Overall left breast ecchymosis is vastly improved. There is continued left central breast swelling s/p needle aspiration w/ 150 ccs of fluid removed. 24. Restarted home hydroxyurea on 9/10/24. Removed ACE compression dressing yesterday evening. Denies fevers, chills or pain. States that breast appears much softer than before.  24. States that she her surgical bra was too snug, resulting in the left infra-mammary incision to continue leaking yellow liquid.  No fevers or chills.   PMHx: Right hip arthritis, vertebral disc disease, jak2 + essential thrombocytosis diagnosed in  controlled on hydroxyurea 500 mg, Vitamin D deficiency, HTN, HLD, anxiety. Deviated septum, Hard of hearing (L>R) uses hearing aids PSHx: L TKR, Left hip repair  with pins in place,  x 2.  Meds: Alprazolam 0.25 mg qHS PRN, ASA 81 mg. Hydroxyurea 500 mg QD, Calcium 500 mg, famotidine 20 mg, Losartan 25 mg, Multivitamin. Rosuvastatin 5 mg, sertraline 50 mg QD, Vitamin D3. Magnesium.  Allergies: Zithromax- Hives.  Family Hx: No family hx of breast or ovarian cancer.  GYN: , menarche age 13, Menopause at age 50-53. Age at first pregnancy 39.  Y (3 months)  Hx of OCPs for 20 years from age [age] Bra size: 38 B  Occupation: Retired.  Social: Former Smoker for 10 years, quit    ETOH: Drinks wine, typically 7 times a week.

## 2024-09-20 NOTE — RESULTS/DATA
[FreeTextEntry1] : BREAST PATH/RAD REVIEW.  [facility].  9/9/20 BL SC MG: BIRADs 2. SFGD. Stable L central nodular asymmetry.  9/10/21 BL SC MG: BIRADs 2. SFGD. Scattered benign calcs. R UOQ IM LN. Stable L central nodular asymmetry.  6/21/23 BL SC MG: BIRADS 1. SFGD. Punctate benign calcs.  6/24/24 BL SC MG: BIRADs 0. SFGD. BL benign appearing calcs. L UOQ lobulated mass, mid to posterior depth possibly representing IM LN. (Rec L Dx US).  7/2/24 L Dx US: BIRADs 4B. L 12N6 subtle 0.4 cm mixed echogenicity mass w/ posterior shadowing likely corresponding to mass on MG. (Rec US guided bx) 7/10/24 Left [12N6] US guided core bx: IDC (G1). LVP not seen. ER 95%, UT 95%, HER2 (0) Negative. Ribbon Clip. Concordant. (Rec Surg c/s) Post-bx MG: Ribbon clip seen in the L upper central region w/ overlying mammographically seen focal asymmetry   8/21/24 s/p L cisco loc PM. pT1b Nx.  1. Left PM: IDC (G1) to be 7 mm, negative margins for invasive carcinoma. DCIS (G2) focally <1mm from posterior margin. Additional ADH & FEA. Micro-calcs associated w/ invasive carcinoma and benign epithelium.  9/5/24 s/p L hematoma evacuation and washout.

## 2024-09-20 NOTE — ASSESSMENT
[FreeTextEntry1] : JACOB GRAY is a 72 y/o jak2 + F w/ Left cT1N0 (G1) IDC +/+/- > pT1b Nx s/p L PM on 8/21/24 course c/b hematoma s/p evacuation, washout on 9/5/24.  Here for follow up.    Patient plans to travel to [place] on 9/25/24 and will return on 10/1/24.   80 ccs of fluid were directly aspirated from left breast seroma under ultrasound guidance using sterile technique. Additional seroma serous fluid was manually expressed from infra-mammary incision.  Incision was closed with dermabond and steri-strips.   Pressure dressing with ace wrap was placed. She was instructed to remove ACE dressing in 2 days.   - To start a 7-day course of DS Bactrim for prophylaxis  - Medical Oncology: Scheduled to meet with Dr. Veras on 9/23/24. Oncotype Dx pending.  - Radiation Oncology: Scheduled to meet with Dr. Hawkins on 10/3/24 - Next imaging: BL Dx MG/US due 6/2025.  - RTO in 3 months.

## 2024-09-20 NOTE — RESULTS/DATA
[FreeTextEntry1] : BREAST PATH/RAD REVIEW.  [facility].  9/9/20 BL SC MG: BIRADs 2. SFGD. Stable L central nodular asymmetry.  9/10/21 BL SC MG: BIRADs 2. SFGD. Scattered benign calcs. R UOQ IM LN. Stable L central nodular asymmetry.  6/21/23 BL SC MG: BIRADS 1. SFGD. Punctate benign calcs.  6/24/24 BL SC MG: BIRADs 0. SFGD. BL benign appearing calcs. L UOQ lobulated mass, mid to posterior depth possibly representing IM LN. (Rec L Dx US).  7/2/24 L Dx US: BIRADs 4B. L 12N6 subtle 0.4 cm mixed echogenicity mass w/ posterior shadowing likely corresponding to mass on MG. (Rec US guided bx) 7/10/24 Left [12N6] US guided core bx: IDC (G1). LVP not seen. ER 95%, CA 95%, HER2 (0) Negative. Ribbon Clip. Concordant. (Rec Surg c/s) Post-bx MG: Ribbon clip seen in the L upper central region w/ overlying mammographically seen focal asymmetry   8/21/24 s/p L cisco loc PM. pT1b Nx.  1. Left PM: IDC (G1) to be 7 mm, negative margins for invasive carcinoma. DCIS (G2) focally <1mm from posterior margin. Additional ADH & FEA. Micro-calcs associated w/ invasive carcinoma and benign epithelium.  9/5/24 s/p L hematoma evacuation and washout.

## 2024-09-20 NOTE — HISTORY OF PRESENT ILLNESS
[de-identified] : JACOB GRAY is a 72 y/o jak2 + F w/ Left cT1N0 (G1) IDC +/+/- > pT1b Nx s/p L PM on 24 course c/b hematoma s/p evacuation, washout on 24.  Here for follow up.  Hematologist: Dr. Tanya Matthew.   24 (Initial visit): She denies any palpable breast masses, nipple discharge or skin changes.  Findings were discovered on routine breast screening (see below). Reports no systemic symptoms.  24 s/p L PM - IDC 7 mm, negative margins for invasive CA. DCIS (G2) focally <1mm from posterior margin-> No further surgery is necessary.  The final lumpectomy margin was taken down to muscle.  9/3/24 POD#13 Reports noticing black and blue POD1 over the entire left breast, since then states the bruising has been improving and now area has a yellow-moreno tinge. Denies any breast pain, fever or chills. After surgery resumed her hydroxyurea 500 mg and held off on restarting her baby aspirin.  24 RTOR for Left hematoma evaluation  24 POD# 4. Denies any fevers or chills. Denies any breast pain. Steristrips in place over left inframammary incision. Overall left breast ecchymosis is vastly improved. There is continued left central breast swelling s/p needle aspiration w/ 150 ccs of fluid removed. 24. Restarted home hydroxyurea on 9/10/24. Removed ACE compression dressing yesterday evening. Denies fevers, chills or pain. States that breast appears much softer than before.  24. States that she her surgical bra was too snug, resulting in the left infra-mammary incision to continue leaking yellow liquid.  No fevers or chills.   PMHx: Right hip arthritis, vertebral disc disease, jak2 + essential thrombocytosis diagnosed in  controlled on hydroxyurea 500 mg, Vitamin D deficiency, HTN, HLD, anxiety. Deviated septum, Hard of hearing (L>R) uses hearing aids PSHx: L TKR, Left hip repair  with pins in place,  x 2.  Meds: Alprazolam 0.25 mg qHS PRN, ASA 81 mg. Hydroxyurea 500 mg QD, Calcium 500 mg, famotidine 20 mg, Losartan 25 mg, Multivitamin. Rosuvastatin 5 mg, sertraline 50 mg QD, Vitamin D3. Magnesium.  Allergies: Zithromax- Hives.  Family Hx: No family hx of breast or ovarian cancer.  GYN: , menarche age 13, Menopause at age 50-53. Age at first pregnancy 39.  Y (3 months)  Hx of OCPs for 20 years from age [age] Bra size: 38 B  Occupation: Retired.  Social: Former Smoker for 10 years, quit    ETOH: Drinks wine, typically 7 times a week.

## 2024-09-20 NOTE — ASSESSMENT
[FreeTextEntry1] : JACOB GRAY is a 74 y/o jak2 + F w/ Left cT1N0 (G1) IDC +/+/- > pT1b Nx s/p L PM on 8/21/24 course c/b hematoma s/p evacuation, washout on 9/5/24.  Here for follow up.    Patient plans to travel to [place] on 9/25/24 and will return on 10/1/24.   80 ccs of fluid were directly aspirated from left breast seroma under ultrasound guidance using sterile technique. Additional seroma serous fluid was manually expressed from infra-mammary incision.  Incision was closed with dermabond and steri-strips.   Pressure dressing with ace wrap was placed. She was instructed to remove ACE dressing in 2 days.   - To start a 7-day course of DS Bactrim for prophylaxis  - Medical Oncology: Scheduled to meet with Dr. Veras on 9/23/24. Oncotype Dx pending.  - Radiation Oncology: Scheduled to meet with Dr. Hawkins on 10/3/24 - Next imaging: BL Dx MG/US due 6/2025.  - RTO in 3 months.

## 2024-10-02 NOTE — REASON FOR VISIT
Problem: Infant Inpatient Plan of Care  Goal: Absence of Hospital-Acquired Illness or Injury  Outcome: Progressing  Goal: Optimal Comfort and Wellbeing  Outcome: Progressing     Problem: [place]  Goal: Glucose Stability  Outcome: Progressing  Goal: Effective Oral Intake  Outcome: Progressing  Goal: Optimal Level of Comfort and Activity  Outcome: Progressing  Goal: Effective Oxygenation and Ventilation  Outcome: Progressing  Goal: Skin Health and Integrity  Outcome: Progressing  Goal: Temperature Stability  Outcome: Progressing      [Consideration of Curative Therapy] : consideration of curative therapy for [Breast Cancer] : breast cancer

## 2024-10-03 ENCOUNTER — APPOINTMENT (OUTPATIENT)
Dept: RADIATION ONCOLOGY | Facility: CLINIC | Age: 74
End: 2024-10-03
Payer: MEDICARE

## 2024-10-03 VITALS
OXYGEN SATURATION: 96 % | RESPIRATION RATE: 17 BRPM | SYSTOLIC BLOOD PRESSURE: 125 MMHG | WEIGHT: 150 LBS | HEIGHT: 64 IN | BODY MASS INDEX: 25.61 KG/M2 | TEMPERATURE: 98.78 F | HEART RATE: 89 BPM | DIASTOLIC BLOOD PRESSURE: 69 MMHG

## 2024-10-03 DIAGNOSIS — C50.919 MALIGNANT NEOPLASM OF UNSPECIFIED SITE OF UNSPECIFIED FEMALE BREAST: ICD-10-CM

## 2024-10-03 DIAGNOSIS — E78.5 HYPERLIPIDEMIA, UNSPECIFIED: ICD-10-CM

## 2024-10-03 DIAGNOSIS — F41.9 ANXIETY DISORDER, UNSPECIFIED: ICD-10-CM

## 2024-10-03 DIAGNOSIS — M17.10 UNILATERAL PRIMARY OSTEOARTHRITIS, UNSPECIFIED KNEE: ICD-10-CM

## 2024-10-03 DIAGNOSIS — D47.3 ESSENTIAL (HEMORRHAGIC) THROMBOCYTHEMIA: ICD-10-CM

## 2024-10-03 DIAGNOSIS — N64.89 OTHER SPECIFIED DISORDERS OF BREAST: ICD-10-CM

## 2024-10-03 DIAGNOSIS — I10 ESSENTIAL (PRIMARY) HYPERTENSION: ICD-10-CM

## 2024-10-03 PROCEDURE — 99205 OFFICE O/P NEW HI 60 MIN: CPT | Mod: GC

## 2024-10-03 NOTE — OB/GYN HISTORY
[Currently In Menopause] : currently in menopause [Menopause Age: ____] : patient was [unfilled] years old at menopause [___] : Full Term: [unfilled] full weight-bearing

## 2024-10-03 NOTE — VITALS
[Maximal Pain Intensity: 0/10] : 0/10 [90: Able to carry normal activity; minor signs or symptoms of disease.] : 90: Able to carry normal activity; minor signs or symptoms of disease.  [7 - Distress Level] : Distress Level: 7

## 2024-10-04 ENCOUNTER — APPOINTMENT (OUTPATIENT)
Dept: HEMATOLOGY ONCOLOGY | Facility: CLINIC | Age: 74
End: 2024-10-04
Payer: MEDICARE

## 2024-10-04 VITALS
HEIGHT: 62.6 IN | OXYGEN SATURATION: 98 % | HEART RATE: 60 BPM | TEMPERATURE: 97.2 F | DIASTOLIC BLOOD PRESSURE: 80 MMHG | RESPIRATION RATE: 16 BRPM | WEIGHT: 155.4 LBS | SYSTOLIC BLOOD PRESSURE: 139 MMHG | BODY MASS INDEX: 27.88 KG/M2

## 2024-10-04 PROCEDURE — G2211 COMPLEX E/M VISIT ADD ON: CPT

## 2024-10-04 PROCEDURE — 99215 OFFICE O/P EST HI 40 MIN: CPT

## 2024-10-04 RX ORDER — ANASTROZOLE TABLETS 1 MG/1
1 TABLET ORAL
Qty: 30 | Refills: 3 | Status: ACTIVE | COMMUNITY
Start: 2024-10-04 | End: 1900-01-01

## 2024-10-06 NOTE — REASON FOR VISIT
[Initial Consultation] : an initial consultation [Spouse] : spouse [FreeTextEntry2] : Left Breast Cancer

## 2024-10-06 NOTE — CONSULT LETTER
[Dear  ___] : Dear  [unfilled], [Consult Letter:] : I had the pleasure of evaluating your patient, [unfilled]. [Please see my note below.] : Please see my note below. [Consult Closing:] : Thank you very much for allowing me to participate in the care of this patient.  If you have any questions, please do not hesitate to contact me. [Sincerely,] : Sincerely, [FreeTextEntry3] : Jens Veras MD   Division of Hematology/[place] [address] Tel: [phone] Fax: [phone] Email: [email or web address] [DrNegrito  ___] : Dr. WEBER [DrNegrito ___] : Dr. WEBER

## 2024-10-06 NOTE — ASSESSMENT
[FreeTextEntry1] : 74 yo woman with left breast cancer Stage 1A (T1bNx, ER+%, GA+ %, HER2 neg (0 by IHC)), s/p lumpectomy. She was evaluated by Radiation Oncology and optiming to omit radiation given lack of overall survival benefit.  - as Oncotype RS was 6, no role for adjuvant chemotherapy - would certainly offer endocrine therapy for patient for perio do 5-7 years. - would recommend to start with anastrozole 1 mg daily with plan for about 7 years of therapy based on results of the [facility] Breast and Colorectal Cancer Study Group; ABCSG-16/SALSA published in N Engl J Med 2021;385:395-405. The study showed that extending endocrine therapy for a total of 10 provided no benefit over a total of 7 years, but 10 years of therapy was associated with greater risk of bone fractures. - risks/benefits/side effects including but not limited to increased risk of osteoporosis, worsening arthralgia/myalgia, rise in LFTs that will need to be monitored every 6 months and worsening lipid profile that will need to be monitored by PMD - most recent one density was in 9/2023 which showed Osteoporosis; she was evaluated by Dr. Dewayne Soares and was treated with Reclast (zolendronic acid 5mg) in 2/2024; planned for follow up in 1/2025 - to continue Hydrea for ET under the care of Dr. Tanya Matthew (hematology)  - Patient had the opportunity to have all their questions answered to their satisfaction - f/u in 3 months [Curative] : Goals of care discussed with patient: Curative [Patient/Caregiver not ready to engage] : Patient/Caregiver not ready to engage

## 2024-10-06 NOTE — PHYSICAL EXAM
[Fully active, able to carry on all pre-disease performance without restriction] : Status 0 - Fully active, able to carry on all pre-disease performance without restriction [Normal] : affect appropriate [de-identified] : Left breast inferior incision well healed, some swelling with known seroma

## 2024-10-06 NOTE — HISTORY OF PRESENT ILLNESS
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 Matthew, who in 2024 was diagnosed left breast cancer. Decision was made to continue Hydroxyurea during the perioperative period.  24 - Diagnostic mammogram Left Breast at 12:00 position 6 cm from the nipple, there is a subtle 0.3 x 0.4 x 0.3 cm mixed echogenicity mass with posterior shadowing, likely corresponding to the mammographically seen mass.    7/15/24 - stereotactic biopsy of the showed IDC well differentiated, NS 4/9, 1.5mm, -LVI, ER %, AZ %, HER2-ve  24 - Left breast lumpectomy final pathology notable for IDC NS 5/9, 7mm, DCIS sloid with intermediate nuclear grade, margins negative for invasive but DCIS margins <1mm at posterior margin, ADH, -EIC, -LVI, ER %, [place] % pT1bNx. Oncotype DX RS is 6.  Her post op course complicated by hematoma for which she underwent evacuation on 2024. Two aspirations done for post op seroma. She was started on prophylactic antibiotics.  She was evaluated by Radiation Oncology on 10/3/24 with plan to forgo adjuvant radiation therapy given lack of overall survival benefit given her advanced age.  RISK STRATIFICATION , first full term pregnancy at age 39; menarche 11, menopause at age 54; OCPs for about 15-20 years; Former smoker; quit at age 38.  [de-identified] : ER+PA+HER2 neg [de-identified] : Oncotype RS 6 [100: Normal, no complaints, no evidence of disease.] : 100: Normal, no complaints, no evidence of disease. [ECOG Performance Status: 0 - Fully active, able to carry on all pre-disease performance without restriction] : Performance Status: 0 - Fully active, able to carry on all pre-disease performance without restriction

## 2024-10-06 NOTE — HISTORY OF PRESENT ILLNESS
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 Matthew, who in 2024 was diagnosed left breast cancer. Decision was made to continue Hydroxyurea during the perioperative period.  24 - Diagnostic mammogram Left Breast at 12:00 position 6 cm from the nipple, there is a subtle 0.3 x 0.4 x 0.3 cm mixed echogenicity mass with posterior shadowing, likely corresponding to the mammographically seen mass.    7/15/24 - stereotactic biopsy of the showed IDC well differentiated, NS 4/9, 1.5mm, -LVI, ER %, MN %, HER2-ve  24 - Left breast lumpectomy final pathology notable for IDC NS 5/9, 7mm, DCIS sloid with intermediate nuclear grade, margins negative for invasive but DCIS margins <1mm at posterior margin, ADH, -EIC, -LVI, ER %, MN % pT1bNx. Oncotype DX RS is 6.  Her post op course complicated by hematoma for which she underwent evacuation on 2024. Two aspirations done for post op seroma. She was started on prophylactic antibiotics.  She was evaluated by Radiation Oncology on 10/3/24 with plan to forgo adjuvant radiation therapy given lack of overall survival benefit given her advanced age.  RISK STRATIFICATION , first full term pregnancy at age 39; menarche 11, menopause at age 54; OCPs for about 15-20 years; Former smoker; quit at age 38.  [de-identified] : ER+RI+HER2 neg [de-identified] : Oncotype RS 6 [100: Normal, no complaints, no evidence of disease.] : 100: Normal, no complaints, no evidence of disease. [ECOG Performance Status: 0 - Fully active, able to carry on all pre-disease performance without restriction] : Performance Status: 0 - Fully active, able to carry on all pre-disease performance without restriction

## 2024-10-06 NOTE — PHYSICAL EXAM
[Fully active, able to carry on all pre-disease performance without restriction] : Status 0 - Fully active, able to carry on all pre-disease performance without restriction [Normal] : affect appropriate [de-identified] : Left breast inferior incision well healed, some swelling with known seroma

## 2024-10-06 NOTE — ASSESSMENT
[FreeTextEntry1] : 74 yo woman with left breast cancer Stage 1A (T1bNx, ER+%, MT+ %, HER2 neg (0 by IHC)), s/p lumpectomy. She was evaluated by Radiation Oncology and optiming to omit radiation given lack of overall survival benefit.  - as Oncotype RS was 6, no role for adjuvant chemotherapy - would certainly offer endocrine therapy for patient for perio do 5-7 years. - would recommend to start with anastrozole 1 mg daily with plan for about 7 years of therapy based on results of the [facility] Breast and Colorectal Cancer Study Group; ABCSG-16/SALSA published in N Engl J Med 2021;385:395-405. The study showed that extending endocrine therapy for a total of 10 provided no benefit over a total of 7 years, but 10 years of therapy was associated with greater risk of bone fractures. - risks/benefits/side effects including but not limited to increased risk of osteoporosis, worsening arthralgia/myalgia, rise in LFTs that will need to be monitored every 6 months and worsening lipid profile that will need to be monitored by PMD - most recent one density was in 9/2023 which showed Osteoporosis; she was evaluated by Dr. Dewayne Soares and was treated with Reclast (zolendronic acid 5mg) in 2/2024; planned for follow up in 1/2025 - to continue Hydrea for ET under the care of Dr. Tanya Matthew (hematology)  - Patient had the opportunity to have all their questions answered to their satisfaction - f/u in 3 months [Curative] : Goals of care discussed with patient: Curative [Patient/Caregiver not ready to engage] : Patient/Caregiver not ready to engage

## 2024-10-06 NOTE — CONSULT LETTER
[Dear  ___] : Dear  [unfilled], [Consult Letter:] : I had the pleasure of evaluating your patient, [unfilled]. [Please see my note below.] : Please see my note below. [Consult Closing:] : Thank you very much for allowing me to participate in the care of this patient.  If you have any questions, please do not hesitate to contact me. [Sincerely,] : Sincerely, [FreeTextEntry3] : Jens Veras MD   Division of Hematology/[place] [address] Tel: [phone] Fax: [phone] Email: [email or web address] [DrNegrito  ___] : Dr. WEBER [DrNegrito ___] : Dr. WEBER

## 2024-10-07 ENCOUNTER — NON-APPOINTMENT (OUTPATIENT)
Age: 74
End: 2024-10-07

## 2024-10-09 ENCOUNTER — RX RENEWAL (OUTPATIENT)
Age: 74
End: 2024-10-09

## 2024-10-10 NOTE — PHYSICAL EXAM
[General Appearance - Well Developed] : well developed [Thin] : thin [No Focal Deficits] : no focal deficits [Oriented To Time, Place, And Person] : oriented to person, place, and time [Sclera] : the sclera and conjunctiva were normal [Outer Ear] : the ears and nose were normal in appearance [] : no respiratory distress [Heart Rate And Rhythm] : heart rate and rhythm were normal [Abdomen Soft] : soft [Nondistended] : nondistended [Normal] : no palpable adenopathy [Supraclavicular Lymph Nodes Enlarged Bilaterally] : supraclavicular [Axillary Lymph Nodes Enlarged Bilaterally] : axillary [de-identified] : left breast incision is clean and dry wtih minimal induration and erythema

## 2024-10-10 NOTE — LETTER CLOSING
[Consult Closing:] : Thank you for allowing me to participate in the care of this patient.  If you have any questions, please do not hesitate to contact me. [Sincerely yours,] : Sincerely yours, [FreeTextEntry3] : Ofe Hawkins MD

## 2024-10-10 NOTE — PHYSICAL EXAM
[General Appearance - Well Developed] : well developed [Thin] : thin [No Focal Deficits] : no focal deficits [Oriented To Time, Place, And Person] : oriented to person, place, and time [Sclera] : the sclera and conjunctiva were normal [Outer Ear] : the ears and nose were normal in appearance [] : no respiratory distress [Heart Rate And Rhythm] : heart rate and rhythm were normal [Abdomen Soft] : soft [Nondistended] : nondistended [Normal] : no palpable adenopathy [Supraclavicular Lymph Nodes Enlarged Bilaterally] : supraclavicular [Axillary Lymph Nodes Enlarged Bilaterally] : axillary [de-identified] : left breast incision is clean and dry wtih minimal induration and erythema

## 2024-10-10 NOTE — HISTORY OF PRESENT ILLNESS
[FreeTextEntry1] : Ms. Prince is a 73-year-old female with newly diagnosed breast cancer, referred for consultation regarding adjuvant radiation therapy.   She was undergoing routine screening mammography. Screening mammography on 6/24/24 showed a lobulated mass in the upper outer left breast at mid to posterior depth.   Targeted left breast ultrasound on 7/21/24 showed an indeterminate mixed echogenicity mass at 12:00 6 cm FN measuring up to 0.4 cm, corresponding to the mammographic finding.   Ultrasound guided core biopsy of the left breast on 7/10/24 showed invasive well differentiated ductal carcinoma, [place] score 4/9, measuring at least 1.5 mm. There was no lymphovascular invasion. IHC showed ER 95%, RI 95%, and HER2 0.  She underwent left breast lumpectomy on 8/21/24. Pathology showed invasive well differentiated ductal carcinoma, [place] score 5/9, measuring 7 mm. Ductal carcinoma in situ was present with solid pattern and intermediate nuclear grade. DCIS was present in 2 out of 8 slides and present focally within 1 mm from the posterior margin. Margins were negative for invasive carcinoma. There was no lymphovascular invasion. Oncotype Dx recurrence score was 6.   Post-operative course was complicated by evacuation of hematoma on 9/5/24 followed by two aspirations for seroma.  She currently reports no discharge or pain but minimal swelling around the wound. She denies fever, chills and loss of weight.

## 2024-10-10 NOTE — HISTORY OF PRESENT ILLNESS
[FreeTextEntry1] : Ms. Prince is a 73-year-old female with newly diagnosed breast cancer, referred for consultation regarding adjuvant radiation therapy.   She was undergoing routine screening mammography. Screening mammography on 6/24/24 showed a lobulated mass in the upper outer left breast at mid to posterior depth.   Targeted left breast ultrasound on 7/21/24 showed an indeterminate mixed echogenicity mass at 12:00 6 cm FN measuring up to 0.4 cm, corresponding to the mammographic finding.   Ultrasound guided core biopsy of the left breast on 7/10/24 showed invasive well differentiated ductal carcinoma, [place] score 4/9, measuring at least 1.5 mm. There was no lymphovascular invasion. IHC showed ER 95%, MD 95%, and HER2 0.  She underwent left breast lumpectomy on 8/21/24. Pathology showed invasive well differentiated ductal carcinoma, [place] score 5/9, measuring 7 mm. Ductal carcinoma in situ was present with solid pattern and intermediate nuclear grade. DCIS was present in 2 out of 8 slides and present focally within 1 mm from the posterior margin. Margins were negative for invasive carcinoma. There was no lymphovascular invasion. Oncotype Dx recurrence score was 6.   Post-operative course was complicated by evacuation of hematoma on 9/5/24 followed by two aspirations for seroma.  She currently reports no discharge or pain but minimal swelling around the wound. She denies fever, chills and loss of weight.

## 2024-10-10 NOTE — PHYSICAL EXAM
[General Appearance - Well Developed] : well developed [Thin] : thin [No Focal Deficits] : no focal deficits [Oriented To Time, Place, And Person] : oriented to person, place, and time [Sclera] : the sclera and conjunctiva were normal [Outer Ear] : the ears and nose were normal in appearance [] : no respiratory distress [Heart Rate And Rhythm] : heart rate and rhythm were normal [Abdomen Soft] : soft [Nondistended] : nondistended [Normal] : no palpable adenopathy [Supraclavicular Lymph Nodes Enlarged Bilaterally] : supraclavicular [Axillary Lymph Nodes Enlarged Bilaterally] : axillary [de-identified] : left breast incision is clean and dry wtih minimal induration and erythema

## 2024-10-10 NOTE — HISTORY OF PRESENT ILLNESS
[FreeTextEntry1] : Ms. Prince is a 73-year-old female with newly diagnosed breast cancer, referred for consultation regarding adjuvant radiation therapy.   She was undergoing routine screening mammography. Screening mammography on 6/24/24 showed a lobulated mass in the upper outer left breast at mid to posterior depth.   Targeted left breast ultrasound on 7/21/24 showed an indeterminate mixed echogenicity mass at 12:00 6 cm FN measuring up to 0.4 cm, corresponding to the mammographic finding.   Ultrasound guided core biopsy of the left breast on 7/10/24 showed invasive well differentiated ductal carcinoma, [place] score 4/9, measuring at least 1.5 mm. There was no lymphovascular invasion. IHC showed ER 95%, CO 95%, and HER2 0.  She underwent left breast lumpectomy on 8/21/24. Pathology showed invasive well differentiated ductal carcinoma, [place] score 5/9, measuring 7 mm. Ductal carcinoma in situ was present with solid pattern and intermediate nuclear grade. DCIS was present in 2 out of 8 slides and present focally within 1 mm from the posterior margin. Margins were negative for invasive carcinoma. There was no lymphovascular invasion. Oncotype Dx recurrence score was 6.   Post-operative course was complicated by evacuation of hematoma on 9/5/24 followed by two aspirations for seroma.  She currently reports no discharge or pain but minimal swelling around the wound. She denies fever, chills and loss of weight.

## 2024-10-10 NOTE — PHYSICAL EXAM
[General Appearance - Well Developed] : well developed [Thin] : thin [No Focal Deficits] : no focal deficits [Oriented To Time, Place, And Person] : oriented to person, place, and time [Sclera] : the sclera and conjunctiva were normal [Outer Ear] : the ears and nose were normal in appearance [] : no respiratory distress [Heart Rate And Rhythm] : heart rate and rhythm were normal [Abdomen Soft] : soft [Nondistended] : nondistended [Normal] : no palpable adenopathy [Supraclavicular Lymph Nodes Enlarged Bilaterally] : supraclavicular [Axillary Lymph Nodes Enlarged Bilaterally] : axillary [de-identified] : left breast incision is clean and dry wtih minimal induration and erythema

## 2024-10-10 NOTE — HISTORY OF PRESENT ILLNESS
[FreeTextEntry1] : Ms. Prince is a 73-year-old female with newly diagnosed breast cancer, referred for consultation regarding adjuvant radiation therapy.   She was undergoing routine screening mammography. Screening mammography on 6/24/24 showed a lobulated mass in the upper outer left breast at mid to posterior depth.   Targeted left breast ultrasound on 7/21/24 showed an indeterminate mixed echogenicity mass at 12:00 6 cm FN measuring up to 0.4 cm, corresponding to the mammographic finding.   Ultrasound guided core biopsy of the left breast on 7/10/24 showed invasive well differentiated ductal carcinoma, [place] score 4/9, measuring at least 1.5 mm. There was no lymphovascular invasion. IHC showed ER 95%, CA 95%, and HER2 0.  She underwent left breast lumpectomy on 8/21/24. Pathology showed invasive well differentiated ductal carcinoma, [place] score 5/9, measuring 7 mm. Ductal carcinoma in situ was present with solid pattern and intermediate nuclear grade. DCIS was present in 2 out of 8 slides and present focally within 1 mm from the posterior margin. Margins were negative for invasive carcinoma. There was no lymphovascular invasion. Oncotype Dx recurrence score was 6.   Post-operative course was complicated by evacuation of hematoma on 9/5/24 followed by two aspirations for seroma.  She currently reports no discharge or pain but minimal swelling around the wound. She denies fever, chills and loss of weight.

## 2024-10-29 NOTE — REVIEW OF SYSTEMS
Caller: ZADA ALVARENGA    Relationship: SELF    Best call back number: [phone]    Requested Prescriptions:   Requested Prescriptions     Pending Prescriptions Disp Refills    Insulin Glargine (BASAGLAR KWIKPEN) 100 UNIT/ML injection pen 135 mL 1     Sig: Inject 50 units subcutaneously once in the morning and 75 units at night    insulin aspart (NovoLOG FlexPen) 100 UNIT/ML solution pen-injector sc pen 30 mL 1     Sig: Inject 10 Units under the skin into the appropriate area as directed 3 (Three) Times a Day With Meals.        Pharmacy where request should be sent: [facility]     Last office visit with prescribing clinician: 7/30/2024   Last telemedicine visit with prescribing clinician: Visit date not found   Next office visit with prescribing clinician: 10/30/2024     Additional details provided by patient:     Does the patient have less than a 3 day supply:  [] Yes  [] No    Would you like a call back once the refill request has been completed: [] Yes [] No    If the office needs to give you a call back, can they leave a voicemail: [] Yes [] No    Milagros Moctezuma Rep   10/29/24 09:36 EDT          [Negative] : Allergic/Immunologic

## 2024-10-31 DIAGNOSIS — D47.3 ESSENTIAL (HEMORRHAGIC) THROMBOCYTHEMIA: ICD-10-CM

## 2024-11-05 ENCOUNTER — APPOINTMENT (OUTPATIENT)
Dept: CARDIOLOGY | Facility: CLINIC | Age: 74
End: 2024-11-05
Payer: MEDICARE

## 2024-11-05 ENCOUNTER — NON-APPOINTMENT (OUTPATIENT)
Age: 74
End: 2024-11-05

## 2024-11-05 VITALS
SYSTOLIC BLOOD PRESSURE: 115 MMHG | BODY MASS INDEX: 26.91 KG/M2 | HEART RATE: 82 BPM | OXYGEN SATURATION: 96 % | DIASTOLIC BLOOD PRESSURE: 77 MMHG | WEIGHT: 150 LBS

## 2024-11-05 DIAGNOSIS — I10 ESSENTIAL (PRIMARY) HYPERTENSION: ICD-10-CM

## 2024-11-05 DIAGNOSIS — E78.5 HYPERLIPIDEMIA, UNSPECIFIED: ICD-10-CM

## 2024-11-05 PROCEDURE — 93000 ELECTROCARDIOGRAM COMPLETE: CPT

## 2024-11-05 PROCEDURE — 99214 OFFICE O/P EST MOD 30 MIN: CPT

## 2024-11-05 PROCEDURE — G2211 COMPLEX E/M VISIT ADD ON: CPT

## 2024-11-06 ENCOUNTER — OUTPATIENT (OUTPATIENT)
Dept: OUTPATIENT SERVICES | Facility: HOSPITAL | Age: 74
LOS: 1 days | Discharge: HOME | End: 2024-11-06

## 2024-11-06 DIAGNOSIS — Z92.89 PERSONAL HISTORY OF OTHER MEDICAL TREATMENT: Chronic | ICD-10-CM

## 2024-11-06 DIAGNOSIS — Z98.890 OTHER SPECIFIED POSTPROCEDURAL STATES: Chronic | ICD-10-CM

## 2024-11-06 DIAGNOSIS — Z98.891 HISTORY OF UTERINE SCAR FROM PREVIOUS SURGERY: Chronic | ICD-10-CM

## 2024-11-06 DIAGNOSIS — Z96.652 PRESENCE OF LEFT ARTIFICIAL KNEE JOINT: Chronic | ICD-10-CM

## 2024-11-06 DIAGNOSIS — D47.3 ESSENTIAL (HEMORRHAGIC) THROMBOCYTHEMIA: ICD-10-CM

## 2024-11-11 ENCOUNTER — RESULT REVIEW (OUTPATIENT)
Age: 74
End: 2024-11-11

## 2024-11-11 ENCOUNTER — APPOINTMENT (OUTPATIENT)
Dept: HEMATOLOGY ONCOLOGY | Facility: CLINIC | Age: 74
End: 2024-11-11
Payer: MEDICARE

## 2024-11-11 VITALS
BODY MASS INDEX: 27.27 KG/M2 | RESPIRATION RATE: 16 BRPM | SYSTOLIC BLOOD PRESSURE: 146 MMHG | HEART RATE: 74 BPM | OXYGEN SATURATION: 96 % | TEMPERATURE: 98.6 F | DIASTOLIC BLOOD PRESSURE: 78 MMHG | WEIGHT: 152 LBS

## 2024-11-11 VITALS
DIASTOLIC BLOOD PRESSURE: 75 MMHG | WEIGHT: 144.62 LBS | TEMPERATURE: 97.5 F | OXYGEN SATURATION: 99 % | RESPIRATION RATE: 17 BRPM | HEART RATE: 76 BPM | BODY MASS INDEX: 25.95 KG/M2 | SYSTOLIC BLOOD PRESSURE: 129 MMHG

## 2024-11-11 DIAGNOSIS — D47.3 ESSENTIAL (HEMORRHAGIC) THROMBOCYTHEMIA: ICD-10-CM

## 2024-11-11 LAB
BASOPHILS # BLD AUTO: 0.01 K/UL — SIGNIFICANT CHANGE UP (ref 0–0.2)
BASOPHILS NFR BLD AUTO: 0.1 % — SIGNIFICANT CHANGE UP (ref 0–2)
EOSINOPHIL # BLD AUTO: 0.01 K/UL — SIGNIFICANT CHANGE UP (ref 0–0.5)
EOSINOPHIL NFR BLD AUTO: 0.1 % — SIGNIFICANT CHANGE UP (ref 0–6)
HCT VFR BLD CALC: 41.1 % — SIGNIFICANT CHANGE UP (ref 34.5–45)
HGB BLD-MCNC: 14.3 G/DL — SIGNIFICANT CHANGE UP (ref 11.5–15.5)
IMM GRANULOCYTES NFR BLD AUTO: 0.3 % — SIGNIFICANT CHANGE UP (ref 0–0.9)
LYMPHOCYTES # BLD AUTO: 0.93 K/UL — LOW (ref 1–3.3)
LYMPHOCYTES # BLD AUTO: 10.2 % — LOW (ref 13–44)
MCHC RBC-ENTMCNC: 34.8 G/DL — SIGNIFICANT CHANGE UP (ref 32–36)
MCHC RBC-ENTMCNC: 35.8 PG — HIGH (ref 27–34)
MCV RBC AUTO: 102.8 FL — HIGH (ref 80–100)
MONOCYTES # BLD AUTO: 0.64 K/UL — SIGNIFICANT CHANGE UP (ref 0–0.9)
MONOCYTES NFR BLD AUTO: 7 % — SIGNIFICANT CHANGE UP (ref 2–14)
NEUTROPHILS # BLD AUTO: 7.47 K/UL — HIGH (ref 1.8–7.4)
NEUTROPHILS NFR BLD AUTO: 82.3 % — HIGH (ref 43–77)
NRBC # BLD: 0 /100 WBCS — SIGNIFICANT CHANGE UP (ref 0–0)
PLATELET # BLD AUTO: 308 K/UL — SIGNIFICANT CHANGE UP (ref 150–400)
RBC # BLD: 4 M/UL — SIGNIFICANT CHANGE UP (ref 3.8–5.2)
RBC # FLD: 11.9 % — SIGNIFICANT CHANGE UP (ref 10.3–14.5)
WBC # BLD: 9.09 K/UL — SIGNIFICANT CHANGE UP (ref 3.8–10.5)
WBC # FLD AUTO: 9.09 K/UL — SIGNIFICANT CHANGE UP (ref 3.8–10.5)

## 2024-11-11 PROCEDURE — 99214 OFFICE O/P EST MOD 30 MIN: CPT

## 2025-01-08 ENCOUNTER — OUTPATIENT (OUTPATIENT)
Dept: OUTPATIENT SERVICES | Facility: HOSPITAL | Age: 75
LOS: 1 days | Discharge: HOME | End: 2025-01-08

## 2025-01-08 DIAGNOSIS — Z96.652 PRESENCE OF LEFT ARTIFICIAL KNEE JOINT: Chronic | ICD-10-CM

## 2025-01-08 DIAGNOSIS — Z98.890 OTHER SPECIFIED POSTPROCEDURAL STATES: Chronic | ICD-10-CM

## 2025-01-08 DIAGNOSIS — Z98.891 HISTORY OF UTERINE SCAR FROM PREVIOUS SURGERY: Chronic | ICD-10-CM

## 2025-01-08 DIAGNOSIS — D47.3 ESSENTIAL (HEMORRHAGIC) THROMBOCYTHEMIA: ICD-10-CM

## 2025-01-08 DIAGNOSIS — Z92.89 PERSONAL HISTORY OF OTHER MEDICAL TREATMENT: Chronic | ICD-10-CM

## 2025-01-13 ENCOUNTER — APPOINTMENT (OUTPATIENT)
Dept: HEMATOLOGY ONCOLOGY | Facility: CLINIC | Age: 75
End: 2025-01-13
Payer: MEDICARE

## 2025-01-13 VITALS
SYSTOLIC BLOOD PRESSURE: 148 MMHG | WEIGHT: 152.12 LBS | RESPIRATION RATE: 16 BRPM | BODY MASS INDEX: 27.29 KG/M2 | HEART RATE: 79 BPM | DIASTOLIC BLOOD PRESSURE: 82 MMHG | OXYGEN SATURATION: 97 % | TEMPERATURE: 97.3 F

## 2025-01-13 DIAGNOSIS — C50.919 MALIGNANT NEOPLASM OF UNSPECIFIED SITE OF UNSPECIFIED FEMALE BREAST: ICD-10-CM

## 2025-01-13 PROCEDURE — 99213 OFFICE O/P EST LOW 20 MIN: CPT

## 2025-01-22 ENCOUNTER — APPOINTMENT (OUTPATIENT)
Dept: ENDOCRINOLOGY | Facility: CLINIC | Age: 75
End: 2025-01-22
Payer: MEDICARE

## 2025-01-22 VITALS — WEIGHT: 150 LBS | BODY MASS INDEX: 27.6 KG/M2 | HEIGHT: 62 IN

## 2025-01-22 VITALS
HEIGHT: 62 IN | HEART RATE: 80 BPM | BODY MASS INDEX: 27.6 KG/M2 | WEIGHT: 150 LBS | DIASTOLIC BLOOD PRESSURE: 70 MMHG | SYSTOLIC BLOOD PRESSURE: 122 MMHG | OXYGEN SATURATION: 98 %

## 2025-01-22 DIAGNOSIS — M81.0 AGE-RELATED OSTEOPOROSIS W/OUT CURRENT PATHOLOGICAL FRACTURE: ICD-10-CM

## 2025-01-22 DIAGNOSIS — Z79.811 LONG TERM (CURRENT) USE OF AROMATASE INHIBITORS: ICD-10-CM

## 2025-01-22 DIAGNOSIS — E55.9 VITAMIN D DEFICIENCY, UNSPECIFIED: ICD-10-CM

## 2025-01-22 PROCEDURE — G2211 COMPLEX E/M VISIT ADD ON: CPT

## 2025-01-22 PROCEDURE — ZZZZZ: CPT

## 2025-01-22 PROCEDURE — 77080 DXA BONE DENSITY AXIAL: CPT | Mod: GA

## 2025-01-22 PROCEDURE — 99214 OFFICE O/P EST MOD 30 MIN: CPT

## 2025-01-22 RX ADMIN — ZOLEDRONIC ACID 5 MG/100ML: 5 INJECTION INTRAVENOUS at 00:00

## 2025-01-24 ENCOUNTER — RX RENEWAL (OUTPATIENT)
Age: 75
End: 2025-01-24

## 2025-01-29 DIAGNOSIS — Z00.00 ENCOUNTER FOR GENERAL ADULT MEDICAL EXAMINATION W/OUT ABNORMAL FINDINGS: ICD-10-CM

## 2025-02-10 ENCOUNTER — APPOINTMENT (OUTPATIENT)
Dept: HEMATOLOGY ONCOLOGY | Facility: CLINIC | Age: 75
End: 2025-02-10

## 2025-02-13 DIAGNOSIS — D47.3 ESSENTIAL (HEMORRHAGIC) THROMBOCYTHEMIA: ICD-10-CM

## 2025-02-24 ENCOUNTER — APPOINTMENT (OUTPATIENT)
Dept: HEMATOLOGY ONCOLOGY | Facility: CLINIC | Age: 75
End: 2025-02-24

## 2025-02-24 ENCOUNTER — APPOINTMENT (OUTPATIENT)
Dept: HEMATOLOGY ONCOLOGY | Facility: CLINIC | Age: 75
End: 2025-02-24
Payer: MEDICARE

## 2025-02-24 ENCOUNTER — RESULT REVIEW (OUTPATIENT)
Age: 75
End: 2025-02-24

## 2025-02-24 ENCOUNTER — NON-APPOINTMENT (OUTPATIENT)
Age: 75
End: 2025-02-24

## 2025-02-24 VITALS
TEMPERATURE: 97.4 F | DIASTOLIC BLOOD PRESSURE: 87 MMHG | HEART RATE: 80 BPM | WEIGHT: 149.46 LBS | OXYGEN SATURATION: 97 % | RESPIRATION RATE: 16 BRPM | BODY MASS INDEX: 27.16 KG/M2 | HEIGHT: 62.2 IN | SYSTOLIC BLOOD PRESSURE: 146 MMHG

## 2025-02-24 DIAGNOSIS — D47.3 ESSENTIAL (HEMORRHAGIC) THROMBOCYTHEMIA: ICD-10-CM

## 2025-02-24 LAB
BASOPHILS # BLD AUTO: 0.02 K/UL — SIGNIFICANT CHANGE UP (ref 0–0.2)
BASOPHILS NFR BLD AUTO: 0.2 % — SIGNIFICANT CHANGE UP (ref 0–2)
EOSINOPHIL # BLD AUTO: 0.03 K/UL — SIGNIFICANT CHANGE UP (ref 0–0.5)
EOSINOPHIL NFR BLD AUTO: 0.3 % — SIGNIFICANT CHANGE UP (ref 0–6)
HCT VFR BLD CALC: 39.3 % — SIGNIFICANT CHANGE UP (ref 34.5–45)
HGB BLD-MCNC: 13.9 G/DL — SIGNIFICANT CHANGE UP (ref 11.5–15.5)
IMM GRANULOCYTES NFR BLD AUTO: 0.4 % — SIGNIFICANT CHANGE UP (ref 0–0.9)
LYMPHOCYTES # BLD AUTO: 1.52 K/UL — SIGNIFICANT CHANGE UP (ref 1–3.3)
LYMPHOCYTES # BLD AUTO: 14.9 % — SIGNIFICANT CHANGE UP (ref 13–44)
MCHC RBC-ENTMCNC: 35.4 G/DL — SIGNIFICANT CHANGE UP (ref 32–36)
MCHC RBC-ENTMCNC: 37 PG — HIGH (ref 27–34)
MCV RBC AUTO: 104.5 FL — HIGH (ref 80–100)
MONOCYTES # BLD AUTO: 0.87 K/UL — SIGNIFICANT CHANGE UP (ref 0–0.9)
MONOCYTES NFR BLD AUTO: 8.5 % — SIGNIFICANT CHANGE UP (ref 2–14)
NEUTROPHILS # BLD AUTO: 7.7 K/UL — HIGH (ref 1.8–7.4)
NEUTROPHILS NFR BLD AUTO: 75.7 % — SIGNIFICANT CHANGE UP (ref 43–77)
NRBC BLD AUTO-RTO: 0 /100 WBCS — SIGNIFICANT CHANGE UP (ref 0–0)
PLATELET # BLD AUTO: 285 K/UL — SIGNIFICANT CHANGE UP (ref 150–400)
RBC # BLD: 3.76 M/UL — LOW (ref 3.8–5.2)
RBC # FLD: 11.9 % — SIGNIFICANT CHANGE UP (ref 10.3–14.5)
WBC # BLD: 10.18 K/UL — SIGNIFICANT CHANGE UP (ref 3.8–10.5)
WBC # FLD AUTO: 10.18 K/UL — SIGNIFICANT CHANGE UP (ref 3.8–10.5)

## 2025-02-24 PROCEDURE — 99214 OFFICE O/P EST MOD 30 MIN: CPT

## 2025-02-28 ENCOUNTER — APPOINTMENT (OUTPATIENT)
Dept: RHEUMATOLOGY | Facility: CLINIC | Age: 75
End: 2025-02-28
Payer: MEDICARE

## 2025-02-28 VITALS
TEMPERATURE: 97.9 F | OXYGEN SATURATION: 96 % | DIASTOLIC BLOOD PRESSURE: 75 MMHG | HEART RATE: 84 BPM | RESPIRATION RATE: 16 BRPM | SYSTOLIC BLOOD PRESSURE: 121 MMHG

## 2025-02-28 VITALS — HEART RATE: 76 BPM | DIASTOLIC BLOOD PRESSURE: 73 MMHG | SYSTOLIC BLOOD PRESSURE: 108 MMHG

## 2025-02-28 PROCEDURE — 96374 THER/PROPH/DIAG INJ IV PUSH: CPT

## 2025-04-02 ENCOUNTER — NON-APPOINTMENT (OUTPATIENT)
Age: 75
End: 2025-04-02

## 2025-05-20 ENCOUNTER — OUTPATIENT (OUTPATIENT)
Dept: OUTPATIENT SERVICES | Facility: HOSPITAL | Age: 75
LOS: 1 days | Discharge: HOME | End: 2025-05-20

## 2025-05-20 DIAGNOSIS — Z92.89 PERSONAL HISTORY OF OTHER MEDICAL TREATMENT: Chronic | ICD-10-CM

## 2025-05-20 DIAGNOSIS — Z98.890 OTHER SPECIFIED POSTPROCEDURAL STATES: Chronic | ICD-10-CM

## 2025-05-20 DIAGNOSIS — Z98.891 HISTORY OF UTERINE SCAR FROM PREVIOUS SURGERY: Chronic | ICD-10-CM

## 2025-05-20 DIAGNOSIS — D47.3 ESSENTIAL (HEMORRHAGIC) THROMBOCYTHEMIA: ICD-10-CM

## 2025-05-20 DIAGNOSIS — Z96.652 PRESENCE OF LEFT ARTIFICIAL KNEE JOINT: Chronic | ICD-10-CM

## 2025-05-23 ENCOUNTER — RESULT REVIEW (OUTPATIENT)
Age: 75
End: 2025-05-23

## 2025-05-23 ENCOUNTER — APPOINTMENT (OUTPATIENT)
Dept: HEMATOLOGY ONCOLOGY | Facility: CLINIC | Age: 75
End: 2025-05-23
Payer: MEDICARE

## 2025-05-23 VITALS
DIASTOLIC BLOOD PRESSURE: 79 MMHG | BODY MASS INDEX: 27.56 KG/M2 | OXYGEN SATURATION: 97 % | TEMPERATURE: 97.2 F | SYSTOLIC BLOOD PRESSURE: 143 MMHG | WEIGHT: 151.68 LBS | HEART RATE: 134 BPM | RESPIRATION RATE: 16 BRPM

## 2025-05-23 DIAGNOSIS — D47.3 ESSENTIAL (HEMORRHAGIC) THROMBOCYTHEMIA: ICD-10-CM

## 2025-05-23 LAB
ALBUMIN SERPL ELPH-MCNC: 4.7 G/DL
ALP BLD-CCNC: 57 U/L
ALT SERPL-CCNC: 14 U/L
ANION GAP SERPL CALC-SCNC: 11 MMOL/L
AST SERPL-CCNC: 21 U/L
BILIRUB SERPL-MCNC: 0.7 MG/DL
BUN SERPL-MCNC: 13 MG/DL
CALCIUM SERPL-MCNC: 10.8 MG/DL
CHLORIDE SERPL-SCNC: 101 MMOL/L
CO2 SERPL-SCNC: 28 MMOL/L
CREAT SERPL-MCNC: 0.62 MG/DL
EGFRCR SERPLBLD CKD-EPI 2021: 92 ML/MIN/1.73M2
GLUCOSE SERPL-MCNC: 134 MG/DL
LDH SERPL-CCNC: 171 U/L
POTASSIUM SERPL-SCNC: 3.9 MMOL/L
PROT SERPL-MCNC: 7.2 G/DL
SODIUM SERPL-SCNC: 140 MMOL/L

## 2025-05-23 PROCEDURE — 99214 OFFICE O/P EST MOD 30 MIN: CPT

## 2025-05-27 ENCOUNTER — NON-APPOINTMENT (OUTPATIENT)
Age: 75
End: 2025-05-27

## 2025-05-27 ENCOUNTER — APPOINTMENT (OUTPATIENT)
Dept: CARDIOLOGY | Facility: CLINIC | Age: 75
End: 2025-05-27
Payer: MEDICARE

## 2025-05-27 VITALS
BODY MASS INDEX: 29.07 KG/M2 | WEIGHT: 160 LBS | HEART RATE: 77 BPM | OXYGEN SATURATION: 98 % | DIASTOLIC BLOOD PRESSURE: 82 MMHG | SYSTOLIC BLOOD PRESSURE: 126 MMHG

## 2025-05-27 DIAGNOSIS — I10 ESSENTIAL (PRIMARY) HYPERTENSION: ICD-10-CM

## 2025-05-27 DIAGNOSIS — E03.9 HYPOTHYROIDISM, UNSPECIFIED: ICD-10-CM

## 2025-05-27 DIAGNOSIS — E78.5 HYPERLIPIDEMIA, UNSPECIFIED: ICD-10-CM

## 2025-05-27 PROCEDURE — 93000 ELECTROCARDIOGRAM COMPLETE: CPT

## 2025-05-27 PROCEDURE — 99214 OFFICE O/P EST MOD 30 MIN: CPT

## 2025-05-27 PROCEDURE — G2211 COMPLEX E/M VISIT ADD ON: CPT

## 2025-06-05 ENCOUNTER — RX RENEWAL (OUTPATIENT)
Age: 75
End: 2025-06-05

## 2025-06-16 ENCOUNTER — RX RENEWAL (OUTPATIENT)
Age: 75
End: 2025-06-16

## 2025-07-01 ENCOUNTER — APPOINTMENT (OUTPATIENT)
Dept: INTERNAL MEDICINE | Facility: CLINIC | Age: 75
End: 2025-07-01
Payer: MEDICARE

## 2025-07-01 ENCOUNTER — OUTPATIENT (OUTPATIENT)
Dept: OUTPATIENT SERVICES | Facility: HOSPITAL | Age: 75
LOS: 1 days | End: 2025-07-01
Payer: MEDICARE

## 2025-07-01 VITALS
BODY MASS INDEX: 27.6 KG/M2 | SYSTOLIC BLOOD PRESSURE: 126 MMHG | HEIGHT: 62 IN | DIASTOLIC BLOOD PRESSURE: 68 MMHG | OXYGEN SATURATION: 97 % | HEART RATE: 85 BPM | WEIGHT: 150 LBS

## 2025-07-01 DIAGNOSIS — Z96.652 PRESENCE OF LEFT ARTIFICIAL KNEE JOINT: Chronic | ICD-10-CM

## 2025-07-01 DIAGNOSIS — Z92.89 PERSONAL HISTORY OF OTHER MEDICAL TREATMENT: Chronic | ICD-10-CM

## 2025-07-01 DIAGNOSIS — Z98.890 OTHER SPECIFIED POSTPROCEDURAL STATES: Chronic | ICD-10-CM

## 2025-07-01 PROCEDURE — G0439: CPT

## 2025-07-02 DIAGNOSIS — I10 ESSENTIAL (PRIMARY) HYPERTENSION: ICD-10-CM

## 2025-07-02 PROBLEM — D75.89 MACROCYTOSIS: Status: ACTIVE | Noted: 2025-07-02

## 2025-07-02 LAB
25(OH)D3 SERPL-MCNC: 53 NG/ML
ALBUMIN SERPL ELPH-MCNC: 4.7 G/DL
ALP BLD-CCNC: 57 U/L
ALT SERPL-CCNC: 18 U/L
ANION GAP SERPL CALC-SCNC: 17 MMOL/L
AST SERPL-CCNC: 20 U/L
BILIRUB SERPL-MCNC: 0.3 MG/DL
BUN SERPL-MCNC: 12 MG/DL
CALCIUM SERPL-MCNC: 10.8 MG/DL
CHLORIDE SERPL-SCNC: 103 MMOL/L
CHOLEST SERPL-MCNC: 220 MG/DL
CO2 SERPL-SCNC: 22 MMOL/L
CREAT SERPL-MCNC: 0.57 MG/DL
EGFRCR SERPLBLD CKD-EPI 2021: 94 ML/MIN/1.73M2
ESTIMATED AVERAGE GLUCOSE: 88 MG/DL
GLUCOSE SERPL-MCNC: 102 MG/DL
HBA1C MFR BLD HPLC: 4.7 %
HCT VFR BLD CALC: 42.7 %
HDLC SERPL-MCNC: 99 MG/DL
HGB BLD-MCNC: 14.3 G/DL
LDLC SERPL-MCNC: 94 MG/DL
MCHC RBC-ENTMCNC: 33.5 G/DL
MCHC RBC-ENTMCNC: 36.8 PG
MCV RBC AUTO: 109.8 FL
NONHDLC SERPL-MCNC: 120 MG/DL
PLATELET # BLD AUTO: 331 K/UL
POTASSIUM SERPL-SCNC: 3.8 MMOL/L
PROT SERPL-MCNC: 7.1 G/DL
RBC # BLD: 3.89 M/UL
RBC # FLD: 12.1 %
SODIUM SERPL-SCNC: 141 MMOL/L
TRIGL SERPL-MCNC: 158 MG/DL
TSH SERPL-ACNC: 2.69 UIU/ML
WBC # FLD AUTO: 9.99 K/UL

## 2025-07-03 LAB — VIT B12 SERPL-MCNC: 768 PG/ML

## 2025-07-08 DIAGNOSIS — Z00.00 ENCOUNTER FOR GENERAL ADULT MEDICAL EXAMINATION WITHOUT ABNORMAL FINDINGS: ICD-10-CM

## 2025-07-08 DIAGNOSIS — D47.3 ESSENTIAL (HEMORRHAGIC) THROMBOCYTHEMIA: ICD-10-CM

## 2025-07-08 DIAGNOSIS — E03.9 HYPOTHYROIDISM, UNSPECIFIED: ICD-10-CM

## 2025-07-08 DIAGNOSIS — E78.5 HYPERLIPIDEMIA, UNSPECIFIED: ICD-10-CM

## 2025-07-08 DIAGNOSIS — Z79.811 LONG TERM (CURRENT) USE OF AROMATASE INHIBITORS: ICD-10-CM

## 2025-07-08 DIAGNOSIS — C50.919 MALIGNANT NEOPLASM OF UNSPECIFIED SITE OF UNSPECIFIED FEMALE BREAST: ICD-10-CM

## 2025-07-08 DIAGNOSIS — M81.0 AGE-RELATED OSTEOPOROSIS WITHOUT CURRENT PATHOLOGICAL FRACTURE: ICD-10-CM

## 2025-07-08 DIAGNOSIS — R73.01 IMPAIRED FASTING GLUCOSE: ICD-10-CM

## 2025-07-08 DIAGNOSIS — E55.9 VITAMIN D DEFICIENCY, UNSPECIFIED: ICD-10-CM

## 2025-07-14 ENCOUNTER — RX RENEWAL (OUTPATIENT)
Age: 75
End: 2025-07-14

## 2025-07-15 ENCOUNTER — APPOINTMENT (OUTPATIENT)
Dept: HEMATOLOGY ONCOLOGY | Facility: CLINIC | Age: 75
End: 2025-07-15
Payer: MEDICARE

## 2025-07-15 VITALS
HEART RATE: 82 BPM | WEIGHT: 152.12 LBS | RESPIRATION RATE: 16 BRPM | BODY MASS INDEX: 27.82 KG/M2 | TEMPERATURE: 97.5 F | OXYGEN SATURATION: 98 % | DIASTOLIC BLOOD PRESSURE: 83 MMHG | SYSTOLIC BLOOD PRESSURE: 127 MMHG

## 2025-07-15 PROCEDURE — G2211 COMPLEX E/M VISIT ADD ON: CPT

## 2025-07-15 PROCEDURE — 99214 OFFICE O/P EST MOD 30 MIN: CPT

## 2025-08-13 ENCOUNTER — APPOINTMENT (OUTPATIENT)
Dept: ULTRASOUND IMAGING | Facility: CLINIC | Age: 75
End: 2025-08-13
Payer: MEDICARE

## 2025-08-13 ENCOUNTER — OUTPATIENT (OUTPATIENT)
Dept: OUTPATIENT SERVICES | Facility: HOSPITAL | Age: 75
LOS: 1 days | End: 2025-08-13
Payer: MEDICARE

## 2025-08-13 ENCOUNTER — RESULT REVIEW (OUTPATIENT)
Age: 75
End: 2025-08-13

## 2025-08-13 ENCOUNTER — APPOINTMENT (OUTPATIENT)
Dept: MAMMOGRAPHY | Facility: CLINIC | Age: 75
End: 2025-08-13
Payer: MEDICARE

## 2025-08-13 DIAGNOSIS — Z96.652 PRESENCE OF LEFT ARTIFICIAL KNEE JOINT: Chronic | ICD-10-CM

## 2025-08-13 DIAGNOSIS — Z98.890 OTHER SPECIFIED POSTPROCEDURAL STATES: Chronic | ICD-10-CM

## 2025-08-13 DIAGNOSIS — C50.919 MALIGNANT NEOPLASM OF UNSPECIFIED SITE OF UNSPECIFIED FEMALE BREAST: ICD-10-CM

## 2025-08-13 DIAGNOSIS — Z98.891 HISTORY OF UTERINE SCAR FROM PREVIOUS SURGERY: Chronic | ICD-10-CM

## 2025-08-13 DIAGNOSIS — Z92.89 PERSONAL HISTORY OF OTHER MEDICAL TREATMENT: Chronic | ICD-10-CM

## 2025-08-13 PROCEDURE — 76641 ULTRASOUND BREAST COMPLETE: CPT | Mod: 26,50,GA

## 2025-08-13 PROCEDURE — G0279: CPT

## 2025-08-13 PROCEDURE — 77066 DX MAMMO INCL CAD BI: CPT | Mod: 26

## 2025-08-13 PROCEDURE — 77066 DX MAMMO INCL CAD BI: CPT

## 2025-08-13 PROCEDURE — G0279: CPT | Mod: 26

## 2025-08-13 PROCEDURE — 76641 ULTRASOUND BREAST COMPLETE: CPT

## (undated) DEVICE — GLV 6.5 PROTEXIS (WHITE)

## (undated) DEVICE — ELCTR GROUNDING PAD ADULT COVIDIEN

## (undated) DEVICE — DRAPE TOWEL BLUE 17" X 24"

## (undated) DEVICE — DRAPE INSTRUMENT POUCH 6.75" X 11"

## (undated) DEVICE — WARMING BLANKET LOWER ADULT

## (undated) DEVICE — BLADE SURGICAL #15 CARBON

## (undated) DEVICE — MARKING PEN W RULER

## (undated) DEVICE — SUT SILK 2-0 30" SH

## (undated) DEVICE — SUT MONOCRYL 4-0 18" P-3 UNDYED

## (undated) DEVICE — ELCTR BOVIE TIP BLADE INSULATED 2.75" EDGE

## (undated) DEVICE — NDL HYPO SAFE 18G X 1.5" (PINK)

## (undated) DEVICE — SUCTION YANKAUER NO CONTROL VENT

## (undated) DEVICE — SPECIMEN CONTAINER 100ML

## (undated) DEVICE — SOL IRR POUR NS 0.9% 500ML

## (undated) DEVICE — STAPLER SKIN VISI-STAT 35 WIDE

## (undated) DEVICE — SOL IRR POUR H2O 250ML

## (undated) DEVICE — DRAIN BLAKE 15FR BARD CHANNEL

## (undated) DEVICE — ONETRAC LIGHTED RETRACTOR 135 X 30MM DISP

## (undated) DEVICE — DRAIN JACKSON PRATT 10MM FLAT FULL NO TROCAR

## (undated) DEVICE — DRAPE COVER SLEEVE GAMMA FINDER III

## (undated) DEVICE — DRSG DERMABOND 0.7ML

## (undated) DEVICE — PACK MINOR NO DRAPE

## (undated) DEVICE — SUT SILK 2-0 18" FS

## (undated) DEVICE — SUT VICRYL 4-0 27" SH UNDYED

## (undated) DEVICE — DRAPE 1/2 SHEET 40X57"

## (undated) DEVICE — SPONGE PEANUT AUTO COUNT

## (undated) DEVICE — DRSG STERISTRIPS 0.5 X 4"

## (undated) DEVICE — DRAPE 3/4 SHEET 52X76"

## (undated) DEVICE — POSITIONER FOAM EGG CRATE ULNAR 2PCS (PINK)

## (undated) DEVICE — GLV 7.5 PROTEXIS (WHITE)

## (undated) DEVICE — SUT VICRYL 3-0 18" SH UNDYED (POP-OFF)

## (undated) DEVICE — DRAPE MAYO STAND 30"

## (undated) DEVICE — ELCTR BOVIE TIP BLADE INSULATED 4" EDGE

## (undated) DEVICE — GLV 7 PROTEXIS (WHITE)

## (undated) DEVICE — DRSG CURITY GAUZE SPONGE 4 X 4" 12-PLY

## (undated) DEVICE — SYR ASEPTO

## (undated) DEVICE — LIGASURE SMALL JAW

## (undated) DEVICE — DRSG TEGADERM 6"X8"

## (undated) DEVICE — VENODYNE/SCD SLEEVE CALF MEDIUM

## (undated) DEVICE — NDL HYPO REGULAR BEVEL 25G X 1.5" (BLUE)

## (undated) DEVICE — DRAIN RESERVOIR FOR JACKSON PRATT 100CC CARDINAL

## (undated) DEVICE — FOLEY TRAY 16FR 5CC LTX UMETER CLOSED

## (undated) DEVICE — GOWN XL

## (undated) DEVICE — SOL IRR POUR H2O 500ML

## (undated) DEVICE — ELCTR BOVIE PENCIL SMOKE EVACUATION

## (undated) DEVICE — DRSG OPSITE 2.5 X 2"

## (undated) DEVICE — DRAPE LIGHT HANDLE COVER (BLUE)

## (undated) DEVICE — ELCTR ROCKER SWITCH PENCIL BLUE 10FT

## (undated) DEVICE — MEDICATION LABELS W MARKER

## (undated) DEVICE — DRSG OPSITE 13.75 X 4"

## (undated) DEVICE — DRAPE LAPAROTOMY TRANSVERSE

## (undated) DEVICE — SAVI SCOUT HANDPIECE

## (undated) DEVICE — PREP CHLORAPREP HI-LITE ORANGE 26ML

## (undated) DEVICE — SYR LUER LOK 5CC